# Patient Record
Sex: FEMALE | Race: WHITE | NOT HISPANIC OR LATINO | Employment: UNEMPLOYED | URBAN - METROPOLITAN AREA
[De-identification: names, ages, dates, MRNs, and addresses within clinical notes are randomized per-mention and may not be internally consistent; named-entity substitution may affect disease eponyms.]

---

## 2023-09-15 ENCOUNTER — HOSPITAL ENCOUNTER (OUTPATIENT)
Dept: DATA CONVERSION | Facility: HOSPITAL | Age: 43
Discharge: HOME | End: 2023-09-15
Payer: COMMERCIAL

## 2023-09-15 DIAGNOSIS — D64.9 ANEMIA, UNSPECIFIED: ICD-10-CM

## 2023-09-15 DIAGNOSIS — R53.83 OTHER FATIGUE: ICD-10-CM

## 2023-09-15 DIAGNOSIS — M79.606 PAIN IN LEG, UNSPECIFIED: ICD-10-CM

## 2023-09-15 LAB
ALBUMIN SERPL-MCNC: 4 GM/DL (ref 3.5–5)
ALBUMIN/GLOB SERPL: 1.3 RATIO (ref 1.5–3)
ALP BLD-CCNC: 162 U/L (ref 35–125)
ALT SERPL-CCNC: 16 U/L (ref 5–40)
ANION GAP SERPL CALCULATED.3IONS-SCNC: 12 MMOL/L (ref 0–19)
AST SERPL-CCNC: 17 U/L (ref 5–40)
BASOPHILS # BLD AUTO: 0.05 K/UL (ref 0–0.22)
BASOPHILS NFR BLD AUTO: 0.6 % (ref 0–1)
BILIRUB SERPL-MCNC: 0.2 MG/DL (ref 0.1–1.2)
BUN SERPL-MCNC: 10 MG/DL (ref 8–25)
BUN/CREAT SERPL: 14.3 RATIO (ref 8–21)
CALCIUM SERPL-MCNC: 9.3 MG/DL (ref 8.5–10.4)
CHLORIDE SERPL-SCNC: 102 MMOL/L (ref 97–107)
CO2 SERPL-SCNC: 26 MMOL/L (ref 24–31)
CREAT SERPL-MCNC: 0.7 MG/DL (ref 0.4–1.6)
DEPRECATED RDW RBC AUTO: 48.9 FL (ref 37–54)
DIFFERENTIAL METHOD BLD: ABNORMAL
EOSINOPHIL # BLD AUTO: 0.3 K/UL (ref 0–0.45)
EOSINOPHIL NFR BLD: 3.7 % (ref 0–3)
ERYTHROCYTE [DISTWIDTH] IN BLOOD BY AUTOMATED COUNT: 15.9 % (ref 11.7–15)
FERRITIN SERPL-MCNC: 19 NG/ML (ref 13–150)
GFR SERPL CREATININE-BSD FRML MDRD: 109 ML/MIN/1.73 M2
GLOBULIN SER-MCNC: 3.2 G/DL (ref 1.9–3.7)
GLUCOSE SERPL-MCNC: 103 MG/DL (ref 65–99)
HCT VFR BLD AUTO: 41.5 % (ref 36–44)
HGB BLD-MCNC: 12.2 GM/DL (ref 12–15)
IMM GRANULOCYTES # BLD AUTO: 0.04 K/UL (ref 0–0.1)
IRON SATN MFR SERPL: 10.2 % (ref 12–50)
IRON SERPL-MCNC: 38 UG/DL (ref 30–160)
LYMPHOCYTES # BLD AUTO: 2.38 K/UL (ref 1.2–3.2)
LYMPHOCYTES NFR BLD MANUAL: 29.3 % (ref 20–40)
MCH RBC QN AUTO: 24.8 PG (ref 26–34)
MCHC RBC AUTO-ENTMCNC: 29.4 % (ref 31–37)
MCV RBC AUTO: 84.5 FL (ref 80–100)
MONOCYTES # BLD AUTO: 0.61 K/UL (ref 0–0.8)
MONOCYTES NFR BLD MANUAL: 7.5 % (ref 0–8)
NEUTROPHILS # BLD AUTO: 4.74 K/UL
NEUTROPHILS # BLD AUTO: 4.74 K/UL (ref 1.8–7.7)
NEUTROPHILS.IMMATURE NFR BLD: 0.5 % (ref 0–1)
NEUTS SEG NFR BLD: 58.4 % (ref 50–70)
NRBC BLD-RTO: 0 /100 WBC
PLATELET # BLD AUTO: 362 K/UL (ref 150–450)
PMV BLD AUTO: 9.9 CU (ref 7–12.6)
POTASSIUM SERPL-SCNC: 4.4 MMOL/L (ref 3.4–5.1)
PROT SERPL-MCNC: 7.2 G/DL (ref 5.9–7.9)
RBC # BLD AUTO: 4.91 M/UL (ref 4–4.9)
SODIUM SERPL-SCNC: 140 MMOL/L (ref 133–145)
TIBC SERPL-MCNC: 371 UG/DL (ref 228–428)
VIT B12 SERPL-MCNC: 314 PG/ML (ref 211–946)
WBC # BLD AUTO: 8.1 K/UL (ref 4.5–11)

## 2023-09-21 ENCOUNTER — HOSPITAL ENCOUNTER (OUTPATIENT)
Dept: DATA CONVERSION | Facility: HOSPITAL | Age: 43
Discharge: HOME | End: 2023-09-21
Payer: COMMERCIAL

## 2023-09-21 DIAGNOSIS — M16.11 UNILATERAL PRIMARY OSTEOARTHRITIS, RIGHT HIP: ICD-10-CM

## 2023-09-22 LAB — MITOCHONDRIAL ANTIBODY: NORMAL

## 2023-09-25 LAB
ALP BONE SERPL-CCNC: ABNORMAL U/L
ALP ISOS SERPL HS-CCNC: ABNORMAL U/L
ALP LIVER SERPL-CCNC: ABNORMAL U/L
ALP SERPL-CCNC: ABNORMAL U/L

## 2023-10-22 DIAGNOSIS — M79.2 NEUROPATHIC PAIN: Primary | ICD-10-CM

## 2023-11-01 RX ORDER — NORTRIPTYLINE HYDROCHLORIDE 25 MG/1
125 CAPSULE ORAL DAILY
Qty: 150 CAPSULE | Refills: 6 | Status: SHIPPED | OUTPATIENT
Start: 2023-11-01 | End: 2024-06-03

## 2023-11-24 ENCOUNTER — OFFICE VISIT (OUTPATIENT)
Dept: PRIMARY CARE | Facility: CLINIC | Age: 43
End: 2023-11-24
Payer: COMMERCIAL

## 2023-11-24 VITALS
HEART RATE: 72 BPM | TEMPERATURE: 98 F | SYSTOLIC BLOOD PRESSURE: 126 MMHG | WEIGHT: 293 LBS | DIASTOLIC BLOOD PRESSURE: 78 MMHG | BODY MASS INDEX: 44.41 KG/M2 | HEIGHT: 68 IN | OXYGEN SATURATION: 99 %

## 2023-11-24 DIAGNOSIS — H53.9 VISUAL DISTURBANCE: ICD-10-CM

## 2023-11-24 DIAGNOSIS — G89.29 CHRONIC NONINTRACTABLE HEADACHE, UNSPECIFIED HEADACHE TYPE: Primary | ICD-10-CM

## 2023-11-24 DIAGNOSIS — R51.9 CHRONIC NONINTRACTABLE HEADACHE, UNSPECIFIED HEADACHE TYPE: Primary | ICD-10-CM

## 2023-11-24 DIAGNOSIS — R26.89 BALANCE PROBLEM: ICD-10-CM

## 2023-11-24 PROCEDURE — 3008F BODY MASS INDEX DOCD: CPT | Performed by: FAMILY MEDICINE

## 2023-11-24 PROCEDURE — 1036F TOBACCO NON-USER: CPT | Performed by: FAMILY MEDICINE

## 2023-11-24 PROCEDURE — 99214 OFFICE O/P EST MOD 30 MIN: CPT | Performed by: FAMILY MEDICINE

## 2023-11-24 RX ORDER — HYOSCYAMINE SULFATE 0.125 MG
0.12 TABLET ORAL EVERY 4 HOURS PRN
COMMUNITY
End: 2023-11-24 | Stop reason: WASHOUT

## 2023-11-24 RX ORDER — LOSARTAN POTASSIUM AND HYDROCHLOROTHIAZIDE 25; 100 MG/1; MG/1
1 TABLET ORAL DAILY
COMMUNITY
Start: 2018-08-29

## 2023-11-24 RX ORDER — METOCLOPRAMIDE 5 MG/1
TABLET ORAL
COMMUNITY
End: 2024-01-09

## 2023-11-24 RX ORDER — HYOSCYAMINE SULFATE 0.38 MG/1
0.38 TABLET, EXTENDED RELEASE ORAL EVERY 12 HOURS PRN
COMMUNITY
End: 2023-11-24 | Stop reason: WASHOUT

## 2023-11-24 RX ORDER — METOCLOPRAMIDE 5 MG/1
5 TABLET ORAL 3 TIMES DAILY
COMMUNITY

## 2023-11-24 RX ORDER — NEBIVOLOL 20 MG/1
20 TABLET ORAL DAILY
COMMUNITY

## 2023-11-24 RX ORDER — ONDANSETRON 4 MG/1
4 TABLET, FILM COATED ORAL EVERY 8 HOURS PRN
COMMUNITY

## 2023-11-24 RX ORDER — HYOSCYAMINE SULFATE 0.12 MG/1
0.12 TABLET SUBLINGUAL
COMMUNITY
Start: 2020-09-16

## 2023-11-24 RX ORDER — VENLAFAXINE HYDROCHLORIDE 75 MG/1
75 CAPSULE, EXTENDED RELEASE ORAL DAILY
COMMUNITY
End: 2024-01-08 | Stop reason: SDUPTHER

## 2023-11-24 RX ORDER — LOSARTAN POTASSIUM 50 MG/1
100 TABLET ORAL DAILY
COMMUNITY
End: 2024-01-09

## 2023-11-24 RX ORDER — VENLAFAXINE HYDROCHLORIDE 150 MG/1
150 CAPSULE, EXTENDED RELEASE ORAL DAILY
COMMUNITY
End: 2024-01-08 | Stop reason: SDUPTHER

## 2023-11-24 RX ORDER — ALPRAZOLAM 1 MG/1
1 TABLET ORAL NIGHTLY PRN
COMMUNITY
End: 2024-01-08

## 2023-11-24 RX ORDER — SUMATRIPTAN SUCCINATE 100 MG/1
100 TABLET ORAL ONCE AS NEEDED
COMMUNITY

## 2023-11-24 RX ORDER — FAMOTIDINE 40 MG/1
40 TABLET, FILM COATED ORAL DAILY
COMMUNITY

## 2023-11-24 ASSESSMENT — ENCOUNTER SYMPTOMS
FEVER: 0
NAUSEA: 0
DIFFICULTY URINATING: 0
ENDOCRINE NEGATIVE: 1
DEPRESSION: 1
LOSS OF SENSATION IN FEET: 0
SHORTNESS OF BREATH: 0
OCCASIONAL FEELINGS OF UNSTEADINESS: 1
DIARRHEA: 0
LEG PAIN: 1
DIZZINESS: 0

## 2023-11-24 ASSESSMENT — PATIENT HEALTH QUESTIONNAIRE - PHQ9
SUM OF ALL RESPONSES TO PHQ9 QUESTIONS 1 & 2: 1
1. LITTLE INTEREST OR PLEASURE IN DOING THINGS: SEVERAL DAYS
2. FEELING DOWN, DEPRESSED OR HOPELESS: NOT AT ALL

## 2023-11-24 ASSESSMENT — PAIN SCALES - GENERAL: PAINLEVEL: 0-NO PAIN

## 2023-11-24 NOTE — PROGRESS NOTES
"Subjective   Patient ID: Angeline Gurrola is a 43 y.o. female who presents for Leg Pain and pain right side of face.    Chronic leg pain  Right side of face painful-in supraorbital area into maxillary region  Vision changes  Head pain  Balance issues    Leg Pain   The incident occurred more than 1 week ago.        Review of Systems   Constitutional:  Negative for fever.        Also see HPI   Eyes:  Negative for visual disturbance.   Respiratory:  Negative for shortness of breath.    Cardiovascular:  Negative for chest pain.   Gastrointestinal:  Negative for diarrhea and nausea.   Endocrine: Negative.    Genitourinary:  Negative for difficulty urinating.   Skin:  Negative for rash.   Neurological:  Negative for dizziness.        No focal deficits   Psychiatric/Behavioral:  Negative for suicidal ideas.    All other systems reviewed and are negative.      Objective   /78   Pulse 72   Temp 36.7 °C (98 °F)   Ht 1.727 m (5' 8\")   Wt (!) 202 kg (446 lb)   SpO2 99%   BMI 67.81 kg/m²     Physical Exam  Vitals and nursing note reviewed.   Constitutional:       Appearance: Normal appearance.   HENT:      Head: Normocephalic and atraumatic.   Eyes:      Extraocular Movements: Extraocular movements intact.      Conjunctiva/sclera: Conjunctivae normal.   Cardiovascular:      Rate and Rhythm: Normal rate and regular rhythm.      Heart sounds: Normal heart sounds.   Pulmonary:      Effort: Pulmonary effort is normal.      Breath sounds: Normal breath sounds.      Comments: Lungs essentially CTA b/l  Abdominal:      General: There is no distension.      Palpations: Abdomen is soft. There is no mass.      Tenderness: There is no abdominal tenderness.   Musculoskeletal:      Right lower leg: Edema present.      Left lower leg: Edema present.   Skin:     Coloration: Skin is not jaundiced.      Findings: No rash.   Neurological:      General: No focal deficit present.      Mental Status: She is alert and oriented to person, " place, and time.   Psychiatric:         Mood and Affect: Mood normal.         Behavior: Behavior normal.         Thought Content: Thought content normal.         Judgment: Judgment normal.         Assessment/Plan   Problem List Items Addressed This Visit    None  Visit Diagnoses         Codes    Chronic nonintractable headache, unspecified headache type    -  Primary R51.9, G89.29    Relevant Orders    MR brain w and wo IV contrast    Comprehensive Metabolic Panel    Visual disturbance     H53.9    Relevant Orders    MR brain w and wo IV contrast    Comprehensive Metabolic Panel    Balance problem     R26.89    Relevant Orders    MR brain w and wo IV contrast    Comprehensive Metabolic Panel

## 2023-12-05 ENCOUNTER — LAB (OUTPATIENT)
Dept: LAB | Facility: LAB | Age: 43
End: 2023-12-05
Payer: COMMERCIAL

## 2023-12-05 DIAGNOSIS — R26.89 BALANCE PROBLEM: ICD-10-CM

## 2023-12-05 DIAGNOSIS — G89.29 CHRONIC NONINTRACTABLE HEADACHE, UNSPECIFIED HEADACHE TYPE: ICD-10-CM

## 2023-12-05 DIAGNOSIS — H53.9 VISUAL DISTURBANCE: ICD-10-CM

## 2023-12-05 DIAGNOSIS — R51.9 CHRONIC NONINTRACTABLE HEADACHE, UNSPECIFIED HEADACHE TYPE: ICD-10-CM

## 2023-12-05 LAB
ALBUMIN SERPL BCP-MCNC: 4 G/DL (ref 3.4–5)
ALP SERPL-CCNC: 171 U/L (ref 33–110)
ALT SERPL W P-5'-P-CCNC: 18 U/L (ref 7–45)
ANION GAP SERPL CALC-SCNC: 12 MMOL/L (ref 10–20)
AST SERPL W P-5'-P-CCNC: 19 U/L (ref 9–39)
BILIRUB SERPL-MCNC: 0.4 MG/DL (ref 0–1.2)
BUN SERPL-MCNC: 8 MG/DL (ref 6–23)
CALCIUM SERPL-MCNC: 9.4 MG/DL (ref 8.6–10.3)
CHLORIDE SERPL-SCNC: 100 MMOL/L (ref 98–107)
CO2 SERPL-SCNC: 30 MMOL/L (ref 21–32)
CREAT SERPL-MCNC: 0.64 MG/DL (ref 0.5–1.05)
GFR SERPL CREATININE-BSD FRML MDRD: >90 ML/MIN/1.73M*2
GLUCOSE SERPL-MCNC: 97 MG/DL (ref 74–99)
POTASSIUM SERPL-SCNC: 4.4 MMOL/L (ref 3.5–5.3)
PROT SERPL-MCNC: 6.9 G/DL (ref 6.4–8.2)
SODIUM SERPL-SCNC: 138 MMOL/L (ref 136–145)

## 2023-12-05 PROCEDURE — 36415 COLL VENOUS BLD VENIPUNCTURE: CPT

## 2023-12-12 ENCOUNTER — APPOINTMENT (OUTPATIENT)
Dept: RADIOLOGY | Facility: HOSPITAL | Age: 43
End: 2023-12-12
Payer: COMMERCIAL

## 2023-12-18 ENCOUNTER — HOSPITAL ENCOUNTER (OUTPATIENT)
Dept: RADIOLOGY | Facility: HOSPITAL | Age: 43
Discharge: HOME | End: 2023-12-18
Payer: COMMERCIAL

## 2023-12-18 DIAGNOSIS — G89.29 CHRONIC NONINTRACTABLE HEADACHE, UNSPECIFIED HEADACHE TYPE: ICD-10-CM

## 2023-12-18 DIAGNOSIS — H53.9 VISUAL DISTURBANCE: ICD-10-CM

## 2023-12-18 DIAGNOSIS — R51.9 CHRONIC NONINTRACTABLE HEADACHE, UNSPECIFIED HEADACHE TYPE: ICD-10-CM

## 2023-12-18 DIAGNOSIS — R26.89 BALANCE PROBLEM: ICD-10-CM

## 2023-12-26 ENCOUNTER — HOSPITAL ENCOUNTER (OUTPATIENT)
Dept: RADIOLOGY | Facility: HOSPITAL | Age: 43
End: 2023-12-26
Payer: COMMERCIAL

## 2023-12-26 ENCOUNTER — TELEPHONE (OUTPATIENT)
Dept: PRIMARY CARE | Facility: CLINIC | Age: 43
End: 2023-12-26
Payer: COMMERCIAL

## 2023-12-26 ENCOUNTER — HOSPITAL ENCOUNTER (OUTPATIENT)
Dept: RADIOLOGY | Facility: HOSPITAL | Age: 43
Discharge: HOME | End: 2023-12-26
Payer: COMMERCIAL

## 2023-12-26 DIAGNOSIS — H53.9 UNSPECIFIED VISUAL DISTURBANCE: ICD-10-CM

## 2023-12-26 DIAGNOSIS — R51.9 HEADACHE, UNSPECIFIED: ICD-10-CM

## 2023-12-26 DIAGNOSIS — F41.9 ANXIETY: Primary | ICD-10-CM

## 2023-12-26 DIAGNOSIS — R26.89 OTHER ABNORMALITIES OF GAIT AND MOBILITY: ICD-10-CM

## 2024-01-02 RX ORDER — VENLAFAXINE HYDROCHLORIDE 150 MG/1
150 CAPSULE, EXTENDED RELEASE ORAL
Qty: 30 CAPSULE | Refills: 0 | OUTPATIENT
Start: 2024-01-02

## 2024-01-02 RX ORDER — VENLAFAXINE HYDROCHLORIDE 75 MG/1
75 CAPSULE, EXTENDED RELEASE ORAL
Qty: 30 CAPSULE | Refills: 0 | OUTPATIENT
Start: 2024-01-02

## 2024-01-03 DIAGNOSIS — F41.9 ANXIETY: Primary | ICD-10-CM

## 2024-01-04 ENCOUNTER — ANCILLARY PROCEDURE (OUTPATIENT)
Dept: RADIOLOGY | Facility: CLINIC | Age: 44
End: 2024-01-04
Payer: COMMERCIAL

## 2024-01-04 VITALS — BODY MASS INDEX: 44.41 KG/M2 | HEIGHT: 68 IN | WEIGHT: 293 LBS

## 2024-01-04 DIAGNOSIS — R26.89 OTHER ABNORMALITIES OF GAIT AND MOBILITY: Primary | ICD-10-CM

## 2024-01-04 DIAGNOSIS — R51.9 HEADACHE: ICD-10-CM

## 2024-01-04 PROCEDURE — 2550000001 HC RX 255 CONTRASTS: Performed by: FAMILY MEDICINE

## 2024-01-04 PROCEDURE — A9575 INJ GADOTERATE MEGLUMI 0.1ML: HCPCS | Performed by: FAMILY MEDICINE

## 2024-01-04 PROCEDURE — 70553 MRI BRAIN STEM W/O & W/DYE: CPT

## 2024-01-04 RX ORDER — GADOTERATE MEGLUMINE 376.9 MG/ML
20 INJECTION INTRAVENOUS
Status: COMPLETED | OUTPATIENT
Start: 2024-01-04 | End: 2024-01-04

## 2024-01-04 RX ADMIN — GADOTERATE MEGLUMINE 20 ML: 376.9 INJECTION INTRAVENOUS at 09:44

## 2024-01-08 RX ORDER — ALPRAZOLAM 1 MG/1
1 TABLET ORAL DAILY PRN
Qty: 30 TABLET | Refills: 2 | Status: SHIPPED | OUTPATIENT
Start: 2024-01-08 | End: 2024-04-21

## 2024-01-08 RX ORDER — VENLAFAXINE HYDROCHLORIDE 75 MG/1
75 CAPSULE, EXTENDED RELEASE ORAL DAILY
Qty: 30 CAPSULE | Refills: 11 | Status: SHIPPED | OUTPATIENT
Start: 2024-01-08

## 2024-01-08 RX ORDER — VENLAFAXINE HYDROCHLORIDE 150 MG/1
150 CAPSULE, EXTENDED RELEASE ORAL DAILY
Qty: 30 CAPSULE | Refills: 11 | Status: SHIPPED | OUTPATIENT
Start: 2024-01-08

## 2024-01-09 ENCOUNTER — OFFICE VISIT (OUTPATIENT)
Dept: OTOLARYNGOLOGY | Facility: CLINIC | Age: 44
End: 2024-01-09
Payer: COMMERCIAL

## 2024-01-09 VITALS — BODY MASS INDEX: 44.41 KG/M2 | WEIGHT: 293 LBS | TEMPERATURE: 96.8 F | HEIGHT: 68 IN

## 2024-01-09 DIAGNOSIS — R51.9 FACIAL PAIN: ICD-10-CM

## 2024-01-09 DIAGNOSIS — J31.0 CHRONIC RHINITIS: Primary | ICD-10-CM

## 2024-01-09 PROCEDURE — 31231 NASAL ENDOSCOPY DX: CPT | Performed by: OTOLARYNGOLOGY

## 2024-01-09 PROCEDURE — 99203 OFFICE O/P NEW LOW 30 MIN: CPT | Performed by: OTOLARYNGOLOGY

## 2024-01-09 PROCEDURE — 1036F TOBACCO NON-USER: CPT | Performed by: OTOLARYNGOLOGY

## 2024-01-09 PROCEDURE — 3008F BODY MASS INDEX DOCD: CPT | Performed by: OTOLARYNGOLOGY

## 2024-01-09 RX ORDER — CHLORDIAZEPOXIDE HYDROCHLORIDE AND CLIDINIUM BROMIDE 5; 2.5 MG/1; MG/1
1 CAPSULE ORAL
COMMUNITY

## 2024-01-09 RX ORDER — FLUTICASONE PROPIONATE 50 MCG
SPRAY, SUSPENSION (ML) NASAL
Qty: 16 G | Refills: 11 | Status: SHIPPED | OUTPATIENT
Start: 2024-01-09

## 2024-01-09 RX ORDER — ALBUTEROL 2 MG/1
2 TABLET ORAL 3 TIMES DAILY
COMMUNITY
End: 2024-03-12 | Stop reason: WASHOUT

## 2024-01-09 RX ORDER — AZELASTINE 1 MG/ML
SPRAY, METERED NASAL
Qty: 30 ML | Refills: 11 | Status: SHIPPED | OUTPATIENT
Start: 2024-01-09

## 2024-01-09 NOTE — PROGRESS NOTES
HPI  Angeline Gurrola is a 43 y.o. female presents with right-sided midface pain and pressure at times.  She does not have a dramatic amount of specific nasal symptoms of congestion or drainage.  She does have some vision changes bilaterally but seems to be worse on the right.  She has history of allergies but not using any nasal medications right now.      Past Medical History:   Diagnosis Date    Ankylosing spondylitis (CMS/HCC)     Asthma     Encounter for therapeutic drug level monitoring 05/23/2018    Encounter for methotrexate monitoring    Other obesity due to excess calories     Exogenous obesity    Personal history of other diseases of the circulatory system     History of peripheral vascular disease    Personal history of other diseases of the circulatory system     History of hypertension            Medications:     Current Outpatient Medications:     albuterol (Proventil) 2 mg tablet, Take 1 tablet (2 mg) by mouth 3 times a day., Disp: , Rfl:     ALPRAZolam (Xanax) 1 mg tablet, TAKE 1 TABLET BY MOUTH ONCE DAILY AS NEEDED FOR ANXIETY, Disp: 30 tablet, Rfl: 2    chlordiazepoxide-clidinium (Librax) 5-2.5 mg capsule, Take 1 capsule by mouth 4 times a day before meals., Disp: , Rfl:     famotidine (Pepcid) 40 mg tablet, Take 1 tablet (40 mg) by mouth once daily., Disp: , Rfl:     hyoscyamine 0.125 mg SL tablet, Take 1 tablet (0.125 mg) by mouth., Disp: , Rfl:     losartan-hydrochlorothiazide (Hyzaar) 100-25 mg tablet, Take 1 tablet by mouth once daily., Disp: , Rfl:     metoclopramide (Reglan) 5 mg tablet, Take 1 tablet (5 mg) by mouth once daily., Disp: , Rfl:     nebivolol (Bystolic) 20 mg tablet, Take 1 tablet (20 mg) by mouth once daily., Disp: , Rfl:     nortriptyline (Pamelor) 25 mg capsule, Take 5 capsules (125 mg) by mouth once daily., Disp: 150 capsule, Rfl: 6    venlafaxine XR (Effexor-XR) 150 mg 24 hr capsule, Take 1 capsule (150 mg) by mouth once daily. Do not crush or chew. Total 225 mg once daily,  "Disp: 30 capsule, Rfl: 11    venlafaxine XR (Effexor-XR) 75 mg 24 hr capsule, Take 1 capsule (75 mg) by mouth once daily. Do not crush or chew. Total 225 mg once daily, Disp: 30 capsule, Rfl: 11    ixekizumab (Taltz) 80 mg/mL injection, Inject 1 mL (80 mg) under the skin., Disp: , Rfl:     losartan (Cozaar) 50 mg tablet, Take 2 tablets (100 mg) by mouth once daily., Disp: , Rfl:     metoclopramide (Reglan) 5 mg tablet, 1 tablet before meals Orally three times per day, Disp: , Rfl:     ondansetron (Zofran) 4 mg tablet, Take 1 tablet (4 mg) by mouth every 8 hours if needed for nausea or vomiting., Disp: , Rfl:     SUMAtriptan (Imitrex) 100 mg tablet, Take 1 tablet (100 mg) by mouth 1 time if needed for migraine., Disp: , Rfl:      Allergies:  Allergies   Allergen Reactions    Infliximab Anaphylaxis    Zolmitriptan Palpitations and Other     Chest pain    Penicillins Hives, Swelling and Rash    Sulfa (Sulfonamide Antibiotics) Hives and Swelling    Cephalexin GI Upset, Nausea/vomiting and Unknown    Doxycycline GI Upset    Oxycodone-Acetaminophen Unknown        Physical Exam:  Last Recorded Vitals  Temperature 36 °C (96.8 °F), height 1.727 m (5' 8\"), weight (!) 196 kg (431 lb).  General:     General appearance: Well-developed, well-nourished in no acute distress.       Voice:  normal       Head/face: Normal appearance; nontender to palpation     Facial nerve function: Normal and symmetric bilaterally.    Oral/oropharynx:     Oral vestibule: Normal labial and gingival mucosa     Tongue/floor of mouth: Normal without lesion     Oropharynx: Clear.  No lesions present of the hard/soft palate, posterior pharynx    Neck:     Neck: Normal appearance, trachea midline     Salivary glands: Normal to palpation bilaterally     Lymph nodes: No cervical lymphadenopathy to palpation     Thyroid: No thyromegaly.  No palpable nodules     Range of motion: Normal    Neurological:     Cortical functions: Alert and oriented x3, appropriate " affect       Larynx/hypopharynx:     Laryngeal findings: Mirror exam inadequate or limited secondary to enlarged base of tongue and/or excessive gagging    Ear:     Ear canal: Normal bilaterally     Tympanic membrane: Intact and mobile bilaterally     Pinna: Normal bilaterally     Hearing:  Gross hearing assessment normal by voice    Nose:     Visualized using: Flexible nasal endoscopy utilized secondary to inadequate anterior rhinoscopy  Nasopharynx: Inadequate mirror examination as above, endoscopy demonstrates normal nasopharynx without obstruction or mass     Nasal dorsum: Nontraumatic midline appearance     Septum: Deviation     Inferior turbinates: Normally sized     Mucosa: Bilateral, erythema and mucus but no pus or polyps      Assessment/Plan   She has evidence of chronic rhinitis.  Not clear if this is a cause for her midface pain.  She does not have evidence of infection.  Recommend trial of nasal sprays.  She will update in about a month.  If no improvement, CT scan of the sinuses.  Neuralgia is also to be considered here         Shin Villareal MD

## 2024-02-01 ENCOUNTER — TELEPHONE (OUTPATIENT)
Dept: PRIMARY CARE | Facility: CLINIC | Age: 44
End: 2024-02-01
Payer: COMMERCIAL

## 2024-02-01 DIAGNOSIS — G93.5 CHIARI I MALFORMATION (MULTI): Primary | ICD-10-CM

## 2024-02-01 NOTE — TELEPHONE ENCOUNTER
----- Message from Angeline Gurrola sent at 2/1/2024  2:58 PM EST -----  Regarding: Referral Needed for Neurologist  Contact: [phone]  Hi Dr. Dailey,     After we spoke today I called Dr. Aubree Young's office to set up an appointment. Her office said they needed a referral from you in order to make an appointment to see Dr. Young. Could you please send one to her office.  Thank You,  Angeline Gurrola

## 2024-02-16 ENCOUNTER — OFFICE VISIT (OUTPATIENT)
Dept: PRIMARY CARE | Facility: CLINIC | Age: 44
End: 2024-02-16
Payer: COMMERCIAL

## 2024-02-16 VITALS
HEART RATE: 62 BPM | WEIGHT: 293 LBS | OXYGEN SATURATION: 97 % | HEIGHT: 68 IN | TEMPERATURE: 97.9 F | SYSTOLIC BLOOD PRESSURE: 136 MMHG | DIASTOLIC BLOOD PRESSURE: 80 MMHG | BODY MASS INDEX: 44.41 KG/M2

## 2024-02-16 DIAGNOSIS — M79.604 LEG PAIN, BILATERAL: ICD-10-CM

## 2024-02-16 DIAGNOSIS — L29.9 ITCHING OF EAR: Primary | ICD-10-CM

## 2024-02-16 DIAGNOSIS — R51.9 ACHING HEADACHE: ICD-10-CM

## 2024-02-16 DIAGNOSIS — J30.1 SEASONAL ALLERGIC RHINITIS DUE TO POLLEN: ICD-10-CM

## 2024-02-16 DIAGNOSIS — M79.671 RIGHT FOOT PAIN: ICD-10-CM

## 2024-02-16 DIAGNOSIS — M79.605 LEG PAIN, BILATERAL: ICD-10-CM

## 2024-02-16 PROCEDURE — 99214 OFFICE O/P EST MOD 30 MIN: CPT | Performed by: FAMILY MEDICINE

## 2024-02-16 PROCEDURE — 1036F TOBACCO NON-USER: CPT | Performed by: FAMILY MEDICINE

## 2024-02-16 PROCEDURE — 3008F BODY MASS INDEX DOCD: CPT | Performed by: FAMILY MEDICINE

## 2024-02-16 RX ORDER — DIPHENOXYLATE HYDROCHLORIDE AND ATROPINE SULFATE 2.5; .025 MG/1; MG/1
1 TABLET ORAL 3 TIMES DAILY PRN
COMMUNITY
Start: 2023-12-22

## 2024-02-16 RX ORDER — MONTELUKAST SODIUM 10 MG/1
10 TABLET ORAL NIGHTLY PRN
Qty: 30 TABLET | Refills: 5 | Status: SHIPPED | OUTPATIENT
Start: 2024-02-16 | End: 2024-08-14

## 2024-02-16 RX ORDER — ALBUTEROL SULFATE 90 UG/1
2 AEROSOL, METERED RESPIRATORY (INHALATION) EVERY 4 HOURS PRN
COMMUNITY
Start: 2023-12-26

## 2024-02-16 RX ORDER — FLUTICASONE FUROATE AND VILANTEROL 200; 25 UG/1; UG/1
POWDER RESPIRATORY (INHALATION)
COMMUNITY
Start: 2019-07-31 | End: 2024-03-12 | Stop reason: WASHOUT

## 2024-02-16 RX ORDER — DICLOFENAC SODIUM AND MISOPROSTOL 75; 200 MG/1; UG/1
1 TABLET, DELAYED RELEASE ORAL 2 TIMES DAILY PRN
Qty: 30 TABLET | Refills: 0 | Status: SHIPPED | OUTPATIENT
Start: 2024-02-16 | End: 2025-02-15

## 2024-02-16 RX ORDER — METOLAZONE 5 MG/1
5 TABLET ORAL EVERY 24 HOURS
COMMUNITY

## 2024-02-16 RX ORDER — SUCRALFATE 1 G/10ML
10 SUSPENSION ORAL
COMMUNITY
End: 2024-03-12 | Stop reason: WASHOUT

## 2024-02-16 ASSESSMENT — PATIENT HEALTH QUESTIONNAIRE - PHQ9
SUM OF ALL RESPONSES TO PHQ9 QUESTIONS 1 AND 2: 0
2. FEELING DOWN, DEPRESSED OR HOPELESS: NOT AT ALL
1. LITTLE INTEREST OR PLEASURE IN DOING THINGS: NOT AT ALL

## 2024-02-16 ASSESSMENT — ENCOUNTER SYMPTOMS
LOSS OF SENSATION IN FEET: 0
DEPRESSION: 0
OCCASIONAL FEELINGS OF UNSTEADINESS: 1

## 2024-02-16 ASSESSMENT — PAIN SCALES - GENERAL: PAINLEVEL: 6

## 2024-02-16 NOTE — PROGRESS NOTES
"Subjective   Patient ID: Angeline Gurrola is a 44 y.o. female who presents for Leg Pain (B/L), Facial Pain (X 1 year-getting worse), and Ear Problem (Right ear-Itchiness ).    HPI     Review of Systems    Objective   /80   Pulse 62   Temp 36.6 °C (97.9 °F)   Ht 1.727 m (5' 8\")   Wt (!) 203 kg (448 lb 6.4 oz)   SpO2 97%   BMI 68.18 kg/m²     Physical Exam    Assessment/Plan   {Assess/PlanSmartLinks:21575}       "

## 2024-02-20 ASSESSMENT — ENCOUNTER SYMPTOMS: LEG PAIN: 1

## 2024-02-20 NOTE — PROGRESS NOTES
"Subjective   Patient ID: Angeline Gurrola is a 44 y.o. female who presents for Leg Pain (B/L), Facial Pain (X 1 year-getting worse), and Ear Problem (Right ear-Itchiness ).    Itching of ear  Aching headache  Seasonal allergic rhinitis due to pollen  Leg pain, bilateral  Right foot pain    Leg Pain     Facial Pain  Pertinent negatives include no chest pain, fever, nausea or rash.        Review of Systems   Constitutional:  Negative for fever.        Also see HPI   Eyes:  Negative for visual disturbance.   Respiratory:  Negative for shortness of breath.    Cardiovascular:  Negative for chest pain.   Gastrointestinal:  Negative for diarrhea and nausea.   Endocrine: Negative.    Genitourinary:  Negative for difficulty urinating.   Skin:  Negative for rash.   Neurological:  Negative for dizziness.        No focal deficits   Psychiatric/Behavioral:  Negative for suicidal ideas.    All other systems reviewed and are negative.      Objective   /80   Pulse 62   Temp 36.6 °C (97.9 °F)   Ht 1.727 m (5' 8\")   Wt (!) 203 kg (448 lb 6.4 oz)   SpO2 97%   BMI 68.18 kg/m²     Physical Exam  Vitals and nursing note reviewed.   Constitutional:       Appearance: Normal appearance.   HENT:      Head: Normocephalic and atraumatic.   Eyes:      Extraocular Movements: Extraocular movements intact.      Conjunctiva/sclera: Conjunctivae normal.   Cardiovascular:      Rate and Rhythm: Normal rate and regular rhythm.      Heart sounds: Normal heart sounds.   Pulmonary:      Effort: Pulmonary effort is normal.      Breath sounds: Normal breath sounds.      Comments: Lungs essentially CTA b/l  Abdominal:      General: There is no distension.      Palpations: Abdomen is soft. There is no mass.      Tenderness: There is no abdominal tenderness.   Musculoskeletal:      Right lower leg: No edema.      Left lower leg: No edema.   Skin:     Coloration: Skin is not jaundiced.      Findings: No rash.   Neurological:      General: No focal " deficit present.      Mental Status: She is alert and oriented to person, place, and time.   Psychiatric:         Mood and Affect: Mood normal.         Behavior: Behavior normal.         Thought Content: Thought content normal.         Judgment: Judgment normal.         Assessment/Plan   Diagnoses and all orders for this visit:  Itching of ear  Aching headache  Seasonal allergic rhinitis due to pollen  -     montelukast (Singulair) 10 mg tablet; Take 1 tablet (10 mg) by mouth as needed at bedtime (allergies).  Leg pain, bilateral  -     diclofenac-misoprostoL (Arthrotec 75)  mg-mcg EC tablet; Take 1 tablet by mouth 2 times a day as needed (pain). Do not crush, chew, or split.  Right foot pain  -     XR foot right 1-2 views; Future

## 2024-03-06 ENCOUNTER — HOSPITAL ENCOUNTER (OUTPATIENT)
Dept: RADIOLOGY | Facility: CLINIC | Age: 44
Discharge: HOME | End: 2024-03-06
Payer: COMMERCIAL

## 2024-03-06 ENCOUNTER — OFFICE VISIT (OUTPATIENT)
Dept: RHEUMATOLOGY | Facility: CLINIC | Age: 44
End: 2024-03-06
Payer: COMMERCIAL

## 2024-03-06 ENCOUNTER — LAB (OUTPATIENT)
Dept: LAB | Facility: LAB | Age: 44
End: 2024-03-06
Payer: COMMERCIAL

## 2024-03-06 VITALS
BODY MASS INDEX: 68.57 KG/M2 | DIASTOLIC BLOOD PRESSURE: 90 MMHG | WEIGHT: 293 LBS | SYSTOLIC BLOOD PRESSURE: 154 MMHG | OXYGEN SATURATION: 97 % | HEART RATE: 68 BPM

## 2024-03-06 DIAGNOSIS — M45.9 ANKYLOSING SPONDYLITIS, UNSPECIFIED SITE OF SPINE (MULTI): Primary | ICD-10-CM

## 2024-03-06 DIAGNOSIS — M45.9 ANKYLOSING SPONDYLITIS, UNSPECIFIED SITE OF SPINE (MULTI): ICD-10-CM

## 2024-03-06 DIAGNOSIS — M79.671 RIGHT FOOT PAIN: ICD-10-CM

## 2024-03-06 LAB
CRP SERPL-MCNC: 1.81 MG/DL
ERYTHROCYTE [DISTWIDTH] IN BLOOD BY AUTOMATED COUNT: 14.8 % (ref 11.5–14.5)
ERYTHROCYTE [SEDIMENTATION RATE] IN BLOOD BY WESTERGREN METHOD: 31 MM/H (ref 0–20)
HCT VFR BLD AUTO: 42 % (ref 36–46)
HGB BLD-MCNC: 12.4 G/DL (ref 12–16)
MCH RBC QN AUTO: 25.6 PG (ref 26–34)
MCHC RBC AUTO-ENTMCNC: 29.5 G/DL (ref 32–36)
MCV RBC AUTO: 87 FL (ref 80–100)
NRBC BLD-RTO: 0 /100 WBCS (ref 0–0)
PLATELET # BLD AUTO: 376 X10*3/UL (ref 150–450)
RBC # BLD AUTO: 4.85 X10*6/UL (ref 4–5.2)
WBC # BLD AUTO: 8.9 X10*3/UL (ref 4.4–11.3)

## 2024-03-06 PROCEDURE — 86140 C-REACTIVE PROTEIN: CPT

## 2024-03-06 PROCEDURE — 86481 TB AG RESPONSE T-CELL SUSP: CPT

## 2024-03-06 PROCEDURE — 73630 X-RAY EXAM OF FOOT: CPT | Mod: RIGHT SIDE | Performed by: RADIOLOGY

## 2024-03-06 PROCEDURE — 1036F TOBACCO NON-USER: CPT | Performed by: INTERNAL MEDICINE

## 2024-03-06 PROCEDURE — 99214 OFFICE O/P EST MOD 30 MIN: CPT | Performed by: INTERNAL MEDICINE

## 2024-03-06 PROCEDURE — 85652 RBC SED RATE AUTOMATED: CPT

## 2024-03-06 PROCEDURE — 3008F BODY MASS INDEX DOCD: CPT | Performed by: INTERNAL MEDICINE

## 2024-03-06 PROCEDURE — 85027 COMPLETE CBC AUTOMATED: CPT

## 2024-03-06 PROCEDURE — 36415 COLL VENOUS BLD VENIPUNCTURE: CPT

## 2024-03-06 PROCEDURE — 73630 X-RAY EXAM OF FOOT: CPT | Mod: RT

## 2024-03-06 ASSESSMENT — ENCOUNTER SYMPTOMS
EYE REDNESS: 1
CONSTITUTIONAL NEGATIVE: 1
NECK PAIN: 1
GASTROINTESTINAL NEGATIVE: 1
NECK STIFFNESS: 1
RESPIRATORY NEGATIVE: 1
MYALGIAS: 1
BACK PAIN: 1
JOINT SWELLING: 1
PSYCHIATRIC NEGATIVE: 1
FACIAL SWELLING: 0
ENDOCRINE NEGATIVE: 1

## 2024-03-06 NOTE — PROGRESS NOTES
Subjective   Patient ID: Angeline Gurrola is a 44 y.o. female who presents for Follow-up (Medication change).  HPI    Ankylosing spondylitis positive HLA-B27 methotrexate or Humira ineffective . Remicade infusion was helpful but had reaction to the medication. Simponi Aria and Humira were ineffective. Previously was on Cosentyx but started losing efficacy. Gave her samples of Toltz and took it for 4 months, but states that she received most relief from Cosentyx, and would like to return to Cosentyx.    Knees, back (C4-C5), shoulders, and hand pain. Patient states symptoms have worsen since seen last (12/14/22). Patient states pain is 8/10. Taking     Recent MRI for vision changed secondary to hemangioma. Neurologist, Aubree Young, next week. Patient states that she has had 6 falls within a two month span. Last fall was 2 weeks ago. Denies breaking any bones.      Last knee injection was done 10/23. Patient is aware she has knee arthritis and lymphedema bilaterally. Patient cracked two teeth, and states teeth have gotten progressive less. Denies shortness of breath. Admits dry mouth and dry eyes.    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    Review of Systems   Constitutional: Negative.    HENT:  Positive for dental problem. Negative for facial swelling and mouth sores.    Eyes:  Positive for redness and visual disturbance.   Respiratory: Negative.     Cardiovascular:  Positive for leg swelling. Negative for chest pain.   Gastrointestinal: Negative.    Endocrine: Negative.    Genitourinary: Negative.    Musculoskeletal:  Positive for back pain, joint swelling, myalgias, neck pain and neck stiffness.   Skin: Negative.    Psychiatric/Behavioral: Negative.           Objective   Physical Exam  Constitutional:       Appearance: Normal appearance.   HENT:      Head: Normocephalic.      Nose: Nose normal.      Mouth/Throat:      Mouth: Mucous membranes are moist.   Eyes:       Extraocular Movements: Extraocular movements intact.   Cardiovascular:      Rate and Rhythm: Normal rate and regular rhythm.      Pulses: Normal pulses.      Heart sounds: Normal heart sounds.   Pulmonary:      Effort: Pulmonary effort is normal.      Breath sounds: Normal breath sounds.   Abdominal:      Palpations: Abdomen is soft.   Musculoskeletal:         General: Swelling and tenderness present.      Cervical back: Tenderness present.      Right lower leg: Edema present.      Left lower leg: Edema present.   Skin:     General: Skin is warm and dry.      Capillary Refill: Capillary refill takes less than 2 seconds.   Neurological:      General: No focal deficit present.      Mental Status: She is alert and oriented to person, place, and time.         Assessment/Plan   Diagnoses and all orders for this visit:  Ankylosing spondylitis, unspecified site of spine (CMS/HCC)    -Cosentyx IV ordered   -Blood work ordered: TB test, liver panel, and inflammatory markers  -Follow up in 4-5 months    Pérez Douglas DPM 03/06/24 2:01 PM

## 2024-03-07 DIAGNOSIS — M45.9 ANKYLOSING SPONDYLITIS, UNSPECIFIED SITE OF SPINE (MULTI): Primary | ICD-10-CM

## 2024-03-07 RX ORDER — ALBUTEROL SULFATE 0.83 MG/ML
3 SOLUTION RESPIRATORY (INHALATION) AS NEEDED
Status: CANCELLED | OUTPATIENT
Start: 2024-03-29

## 2024-03-07 RX ORDER — EPINEPHRINE 0.3 MG/.3ML
0.3 INJECTION SUBCUTANEOUS EVERY 5 MIN PRN
Status: CANCELLED | OUTPATIENT
Start: 2024-03-08

## 2024-03-07 RX ORDER — HEPARIN 100 UNIT/ML
500 SYRINGE INTRAVENOUS AS NEEDED
OUTPATIENT
Start: 2024-03-07

## 2024-03-07 RX ORDER — DIPHENHYDRAMINE HYDROCHLORIDE 50 MG/ML
50 INJECTION INTRAMUSCULAR; INTRAVENOUS AS NEEDED
Status: CANCELLED | OUTPATIENT
Start: 2024-03-29

## 2024-03-07 RX ORDER — FAMOTIDINE 10 MG/ML
20 INJECTION INTRAVENOUS ONCE AS NEEDED
Status: CANCELLED | OUTPATIENT
Start: 2024-03-08

## 2024-03-07 RX ORDER — FAMOTIDINE 10 MG/ML
20 INJECTION INTRAVENOUS ONCE AS NEEDED
Status: CANCELLED | OUTPATIENT
Start: 2024-03-29

## 2024-03-07 RX ORDER — ALBUTEROL SULFATE 0.83 MG/ML
3 SOLUTION RESPIRATORY (INHALATION) AS NEEDED
Status: CANCELLED | OUTPATIENT
Start: 2024-03-08

## 2024-03-07 RX ORDER — DIPHENHYDRAMINE HYDROCHLORIDE 50 MG/ML
50 INJECTION INTRAMUSCULAR; INTRAVENOUS AS NEEDED
Status: CANCELLED | OUTPATIENT
Start: 2024-03-08

## 2024-03-07 RX ORDER — EPINEPHRINE 0.3 MG/.3ML
0.3 INJECTION SUBCUTANEOUS EVERY 5 MIN PRN
Status: CANCELLED | OUTPATIENT
Start: 2024-03-29

## 2024-03-07 RX ORDER — HEPARIN SODIUM,PORCINE/PF 10 UNIT/ML
50 SYRINGE (ML) INTRAVENOUS AS NEEDED
OUTPATIENT
Start: 2024-03-07

## 2024-03-08 ENCOUNTER — HOSPITAL ENCOUNTER (EMERGENCY)
Facility: HOSPITAL | Age: 44
Discharge: HOME | End: 2024-03-08
Attending: FAMILY MEDICINE
Payer: COMMERCIAL

## 2024-03-08 VITALS
SYSTOLIC BLOOD PRESSURE: 158 MMHG | HEART RATE: 70 BPM | BODY MASS INDEX: 44.41 KG/M2 | OXYGEN SATURATION: 94 % | RESPIRATION RATE: 17 BRPM | HEIGHT: 68 IN | WEIGHT: 293 LBS | TEMPERATURE: 98.4 F | DIASTOLIC BLOOD PRESSURE: 94 MMHG

## 2024-03-08 DIAGNOSIS — G43.909 MIGRAINE WITHOUT STATUS MIGRAINOSUS, NOT INTRACTABLE, UNSPECIFIED MIGRAINE TYPE: Primary | ICD-10-CM

## 2024-03-08 DIAGNOSIS — R11.0 NAUSEA: ICD-10-CM

## 2024-03-08 PROCEDURE — 96374 THER/PROPH/DIAG INJ IV PUSH: CPT

## 2024-03-08 PROCEDURE — 2500000004 HC RX 250 GENERAL PHARMACY W/ HCPCS (ALT 636 FOR OP/ED): Performed by: FAMILY MEDICINE

## 2024-03-08 PROCEDURE — 99284 EMERGENCY DEPT VISIT MOD MDM: CPT | Mod: 25

## 2024-03-08 PROCEDURE — 96375 TX/PRO/DX INJ NEW DRUG ADDON: CPT

## 2024-03-08 PROCEDURE — 96361 HYDRATE IV INFUSION ADD-ON: CPT

## 2024-03-08 RX ORDER — DIPHENHYDRAMINE HYDROCHLORIDE 50 MG/ML
50 INJECTION INTRAMUSCULAR; INTRAVENOUS ONCE
Status: COMPLETED | OUTPATIENT
Start: 2024-03-08 | End: 2024-03-08

## 2024-03-08 RX ORDER — METOCLOPRAMIDE HYDROCHLORIDE 5 MG/ML
10 INJECTION INTRAMUSCULAR; INTRAVENOUS ONCE
Status: COMPLETED | OUTPATIENT
Start: 2024-03-08 | End: 2024-03-08

## 2024-03-08 RX ORDER — KETOROLAC TROMETHAMINE 15 MG/ML
15 INJECTION, SOLUTION INTRAMUSCULAR; INTRAVENOUS ONCE
Status: COMPLETED | OUTPATIENT
Start: 2024-03-08 | End: 2024-03-08

## 2024-03-08 RX ADMIN — DIPHENHYDRAMINE HYDROCHLORIDE 50 MG: 50 INJECTION INTRAMUSCULAR; INTRAVENOUS at 20:45

## 2024-03-08 RX ADMIN — SODIUM CHLORIDE 1000 ML: 9 INJECTION, SOLUTION INTRAVENOUS at 20:44

## 2024-03-08 RX ADMIN — KETOROLAC TROMETHAMINE 15 MG: 15 INJECTION INTRAMUSCULAR; INTRAVENOUS at 20:44

## 2024-03-08 RX ADMIN — METOCLOPRAMIDE HYDROCHLORIDE 10 MG: 5 INJECTION INTRAMUSCULAR; INTRAVENOUS at 20:44

## 2024-03-08 ASSESSMENT — PAIN SCALES - GENERAL
PAINLEVEL_OUTOF10: 9
PAINLEVEL_OUTOF10: 10 - WORST POSSIBLE PAIN

## 2024-03-08 ASSESSMENT — COLUMBIA-SUICIDE SEVERITY RATING SCALE - C-SSRS
1. IN THE PAST MONTH, HAVE YOU WISHED YOU WERE DEAD OR WISHED YOU COULD GO TO SLEEP AND NOT WAKE UP?: NO
6. HAVE YOU EVER DONE ANYTHING, STARTED TO DO ANYTHING, OR PREPARED TO DO ANYTHING TO END YOUR LIFE?: NO
2. HAVE YOU ACTUALLY HAD ANY THOUGHTS OF KILLING YOURSELF?: NO

## 2024-03-08 ASSESSMENT — PAIN DESCRIPTION - ORIENTATION: ORIENTATION: LEFT

## 2024-03-08 ASSESSMENT — PAIN - FUNCTIONAL ASSESSMENT
PAIN_FUNCTIONAL_ASSESSMENT: 0-10
PAIN_FUNCTIONAL_ASSESSMENT: 0-10

## 2024-03-08 ASSESSMENT — PAIN DESCRIPTION - LOCATION
LOCATION: HEAD
LOCATION: HEAD

## 2024-03-08 ASSESSMENT — PAIN DESCRIPTION - PAIN TYPE: TYPE: ACUTE PAIN

## 2024-03-09 LAB
NIL(NEG) CONTROL SPOT COUNT: NORMAL
PANEL A SPOT COUNT: 0
PANEL B SPOT COUNT: 0
POS CONTROL SPOT COUNT: NORMAL
T-SPOT. TB INTERPRETATION: NEGATIVE

## 2024-03-09 NOTE — ED PROVIDER NOTES
HPI   Chief Complaint   Patient presents with    Headache     Pt presents with c/o migraine like headache for the last few days, states she took sumatriptan yesterday with little relief       44-year-old female with history of morbid obesity, hypertension, migraines, GERD, anxiety, and asthma comes to the ED with complaint of persistent migraine headache over the left side of her head for the last several days.  Patient reports he attempted take some sumatriptan yesterday but it did not help and continue using Tylenol and Motrin with little improvement.  Patient reports some intermittent nausea as well as being very sensitive to light/noise/stress that aggravates her headache.  Patient reports is not different from her typical migraine she gets and is just not yet passed.  Patient in the ED is alert, cooperative, appears anxious, uncomfortable, but in no distress.  Patient currently denies blurred vision, chest pain, back pain, abdominal pain, shortness of breath, fevers, falls, recent travel, sick contacts, vomiting/diarrhea, dysuria, dizziness, worsening of life, thunderclap headache, and weakness.      History provided by:  Patient and medical records   used: No                        [place] Coma Scale Score: 15                     Patient History   Past Medical History:   Diagnosis Date    Ankylosing spondylitis (CMS/[facility])     Anxiety 2015    Asthma     Encounter for therapeutic drug level monitoring 05/23/2018    Encounter for methotrexate monitoring    GERD (gastroesophageal reflux disease) 2002    Headache 2023    Hypertension 2014    Other obesity due to excess calories     Exogenous obesity    Personal history of other diseases of the circulatory system     History of peripheral vascular disease    Personal history of other diseases of the circulatory system     History of hypertension    Visual impairment 2023     Past Surgical History:   Procedure Laterality Date    ANKLE SURGERY   08/28/2013    Ankle Surgery    CHOLECYSTECTOMY      KNEE ARTHROSCOPY W/ DEBRIDEMENT  08/28/2013    Arthroscopy Knee    OOPHORECTOMY  08/28/2013    Oophorectomy     Family History   Problem Relation Name Age of Onset    Hypertension Mother Marni Gurrola     Arthritis Father Juwan Gurrola     Cancer Father Juwan Gurrola     Diabetes Father Juwan Pocjemima     Hypertension Father Juwan Gurrola     Arthritis Maternal Grandmother Marni Sanderson     Colon cancer Maternal Grandmother Marni Sanderson     Arthritis Paternal Grandmother Veronica Gurrola     Hypertension Paternal Grandmother Veronica Gurrola      Social History     Tobacco Use    Smoking status: Never    Smokeless tobacco: Never   Vaping Use    Vaping Use: Never used   Substance Use Topics    Alcohol use: Not Currently    Drug use: Never       Physical Exam   ED Triage Vitals [03/08/24 1956]   Temperature Heart Rate Respirations BP   36.9 °C (98.4 °F) 70 17 (!) 158/94      Pulse Ox Temp Source Heart Rate Source Patient Position   94 % Temporal -- --      BP Location FiO2 (%)     -- --       Physical Exam  Vitals and nursing note reviewed.   Constitutional:       General: She is not in acute distress.     Appearance: She is well-developed.   HENT:      Head: Normocephalic and atraumatic.   Eyes:      Conjunctiva/sclera: Conjunctivae normal.   Cardiovascular:      Rate and Rhythm: Normal rate and regular rhythm.      Heart sounds: No murmur heard.  Pulmonary:      Effort: Pulmonary effort is normal. No respiratory distress.      Breath sounds: Normal breath sounds.   Abdominal:      Palpations: Abdomen is soft.      Tenderness: There is no abdominal tenderness.   Musculoskeletal:         General: No swelling.      Cervical back: Neck supple.   Skin:     General: Skin is warm and dry.      Capillary Refill: Capillary refill takes less than 2 seconds.   Neurological:      General: No focal deficit present.      Mental Status: She is alert and oriented to person, place, and  time.      GCS: GCS eye subscore is 4. GCS verbal subscore is 5. GCS motor subscore is 6.      Cranial Nerves: Cranial nerves 2-12 are intact.      Sensory: Sensation is intact.      Motor: Motor function is intact.      Coordination: Coordination is intact.      Gait: Gait is intact.   Psychiatric:         Mood and Affect: Mood normal.         ED Course & MDM   Diagnoses as of 03/08/24 4310   Migraine without status migrainosus, not intractable, unspecified migraine type   Nausea       Medical Decision Making  Patient upon arrival to the ED appeared to be anxious and uncomfortable but in no distress stable vital signs.  Discussed with patient presenting complaints and clinical findings.  Reviewed with patient epic chart and counseled patient on migraine headaches and appropriate approach to management/treatments.  After assessment and evaluation IV fluids started, given IV Reglan, IV Benadryl, IV Toradol, placed on cardiac monitor, and observed.  After treatment and a period of rest patient is reassessed finally feeling much better, headache was nearly resolved, nausea resolved, tolerated p.o. challenge, vital signs stable, and no new physical complaints.  Patient vies this time to contact her primary care doctor for follow-up and recheck in several days, educate the patient on use of home medications for management of her headaches, and patient discharged home.    Amount and/or Complexity of Data Reviewed  External Data Reviewed: labs, radiology and notes.    Risk  OTC drugs.  Prescription drug management.        Procedure  Procedures     Nabeel Rodríguez MD  03/08/24 1916

## 2024-03-12 ENCOUNTER — OFFICE VISIT (OUTPATIENT)
Dept: PRIMARY CARE | Facility: CLINIC | Age: 44
End: 2024-03-12
Payer: COMMERCIAL

## 2024-03-12 VITALS
HEIGHT: 68 IN | WEIGHT: 293 LBS | BODY MASS INDEX: 44.41 KG/M2 | OXYGEN SATURATION: 97 % | SYSTOLIC BLOOD PRESSURE: 150 MMHG | HEART RATE: 64 BPM | TEMPERATURE: 97.7 F | DIASTOLIC BLOOD PRESSURE: 90 MMHG

## 2024-03-12 DIAGNOSIS — J01.00 ACUTE NON-RECURRENT MAXILLARY SINUSITIS: Primary | ICD-10-CM

## 2024-03-12 PROCEDURE — 1036F TOBACCO NON-USER: CPT | Performed by: FAMILY MEDICINE

## 2024-03-12 PROCEDURE — 3008F BODY MASS INDEX DOCD: CPT | Performed by: FAMILY MEDICINE

## 2024-03-12 PROCEDURE — 99213 OFFICE O/P EST LOW 20 MIN: CPT | Performed by: FAMILY MEDICINE

## 2024-03-12 RX ORDER — AZITHROMYCIN 500 MG/1
500 TABLET, FILM COATED ORAL DAILY
Qty: 5 TABLET | Refills: 0 | Status: SHIPPED | OUTPATIENT
Start: 2024-03-12 | End: 2024-03-17

## 2024-03-12 RX ORDER — PREDNISONE 10 MG/1
TABLET ORAL
Qty: 21 TABLET | Refills: 0 | Status: SHIPPED | OUTPATIENT
Start: 2024-03-12 | End: 2024-03-17

## 2024-03-12 ASSESSMENT — PATIENT HEALTH QUESTIONNAIRE - PHQ9
1. LITTLE INTEREST OR PLEASURE IN DOING THINGS: NOT AT ALL
SUM OF ALL RESPONSES TO PHQ9 QUESTIONS 1 AND 2: 0
2. FEELING DOWN, DEPRESSED OR HOPELESS: NOT AT ALL

## 2024-03-12 ASSESSMENT — PAIN SCALES - GENERAL: PAINLEVEL: 7

## 2024-03-12 NOTE — PROGRESS NOTES
"Subjective   Patient ID: Angeline Gurrola is a 44 y.o. female who presents for Follow-up ([facility]-migraine).    Nasal congestion postnasal drip rhinorrhea maxillary facial pain and pressure, sinus type headache         Review of Systems   Constitutional:  Negative for fever.        Also see HPI   Eyes:  Negative for visual disturbance.   Respiratory:  Negative for shortness of breath.    Cardiovascular:  Negative for chest pain.   Gastrointestinal:  Negative for diarrhea and nausea.   Endocrine: Negative.    Genitourinary:  Negative for difficulty urinating.   Skin:  Negative for rash.   Neurological:  Negative for dizziness.        No focal deficits   Psychiatric/Behavioral:  Negative for suicidal ideas.    All other systems reviewed and are negative.      Objective   /90   Pulse 64   Temp 36.5 °C (97.7 °F)   Ht 1.727 m (5' 8\")   Wt (!) 203 kg (448 lb 9.6 oz)   SpO2 97%   BMI 68.21 kg/m²     Physical Exam  Vitals and nursing note reviewed.   Constitutional:       Appearance: Normal appearance.   HENT:      Head: Normocephalic and atraumatic.      Comments: Bilateral frontal and maxillary sinus tenderness with palpation     Right Ear: Tympanic membrane normal.      Left Ear: Tympanic membrane normal.      Mouth/Throat:      Comments: Cobblestoning  Eyes:      Extraocular Movements: Extraocular movements intact.      Conjunctiva/sclera: Conjunctivae normal.   Cardiovascular:      Rate and Rhythm: Normal rate and regular rhythm.      Heart sounds: Normal heart sounds.   Pulmonary:      Effort: Pulmonary effort is normal.      Breath sounds: Normal breath sounds.      Comments: Lungs essentially CTA b/l  Abdominal:      General: There is no distension.      Palpations: Abdomen is soft. There is no mass.      Tenderness: There is no abdominal tenderness.   Musculoskeletal:      Right lower leg: No edema.      Left lower leg: No edema.   Skin:     Coloration: Skin is not jaundiced.      Findings: No rash. "   Neurological:      General: No focal deficit present.      Mental Status: She is alert and oriented to person, place, and time.   Psychiatric:         Mood and Affect: Mood normal.         Behavior: Behavior normal.         Thought Content: Thought content normal.         Judgment: Judgment normal.         Assessment/Plan   Diagnoses and all orders for this visit:  Acute non-recurrent maxillary sinusitis  -     azithromycin (Zithromax) 500 mg tablet; Take 1 tablet (500 mg) by mouth once daily for 5 days.  -     predniSONE (Deltasone) 10 mg tablet; Take 6 tablets (60 mg) by mouth once daily for 1 day, THEN 5 tablets (50 mg) once daily for 1 day, THEN 4 tablets (40 mg) once daily for 1 day, THEN 3 tablets (30 mg) once daily for 1 day, THEN 2 tablets (20 mg) once daily for 1 day, THEN 1 tablet (10 mg) once daily for 1 day.

## 2024-03-13 ENCOUNTER — OFFICE VISIT (OUTPATIENT)
Dept: NEUROLOGY | Facility: CLINIC | Age: 44
End: 2024-03-13
Payer: COMMERCIAL

## 2024-03-13 VITALS — HEART RATE: 72 BPM

## 2024-03-13 DIAGNOSIS — R51.9 CHRONIC DAILY HEADACHE: ICD-10-CM

## 2024-03-13 DIAGNOSIS — R53.83 FATIGUE, UNSPECIFIED TYPE: Primary | ICD-10-CM

## 2024-03-13 DIAGNOSIS — E53.8 VITAMIN B12 DEFICIENCY: ICD-10-CM

## 2024-03-13 DIAGNOSIS — G93.5 CHIARI I MALFORMATION (MULTI): ICD-10-CM

## 2024-03-13 DIAGNOSIS — H53.8 BLURRY VISION, BILATERAL: ICD-10-CM

## 2024-03-13 DIAGNOSIS — R26.81 UNSTEADY GAIT: ICD-10-CM

## 2024-03-13 PROCEDURE — 99205 OFFICE O/P NEW HI 60 MIN: CPT | Performed by: PSYCHIATRY & NEUROLOGY

## 2024-03-13 PROCEDURE — 1036F TOBACCO NON-USER: CPT | Performed by: PSYCHIATRY & NEUROLOGY

## 2024-03-13 PROCEDURE — 3008F BODY MASS INDEX DOCD: CPT | Performed by: PSYCHIATRY & NEUROLOGY

## 2024-03-13 RX ORDER — ERGOCALCIFEROL 1.25 MG/1
1.25 CAPSULE ORAL 2 TIMES WEEKLY
COMMUNITY
End: 2024-05-17 | Stop reason: SDUPTHER

## 2024-03-13 ASSESSMENT — ENCOUNTER SYMPTOMS
DIFFICULTY URINATING: 0
FEVER: 0
NAUSEA: 0
DIZZINESS: 0
SHORTNESS OF BREATH: 0
ENDOCRINE NEGATIVE: 1
DIARRHEA: 0

## 2024-03-13 NOTE — PATIENT INSTRUCTIONS
You have developed daily headaches with blurry vision as well as trouble walking with balance problems and falls. Your recent brain MRI showed that the based of your brain sticks into the opening at the base of your skull, but there is no evidence that it is obstructing fluid flow through your brain. Most likely you have been that way all of your life.    Your blood tests indicate that you are low in iron and vitamin B12. Please take iron 325 mg a day for two months and then ask Dr. Dailey to recheck your iron blood tests. Please start vitamin B12 1000 micrograms every day, which you will probably have to take for the rest of your life. Please also get a vitamin B12 shot 1000 micrograms into the muscle to boost your level quickly. Please get a blood test to check your thyroid.    You have been having daily headaches with blurry vision. We will have you consult a neuro-ophthalmologist because there is a condition called idiopathic intracranial hypertension that can cause increased pressure in your head and affect your vision.     Please call with questions. I will see you back in 3 months.

## 2024-03-13 NOTE — PROGRESS NOTES
Chief complaint:    Bad headaches and trouble walking    History of Present Illness:   Ms. Gurrola is a 43 yo right-handed woman who has developed bad headaches in the past 2 years. They usually start on the left side of her head or as a throbbing pain in the back of her head. They were so bad last week that she had to go to an ER. Sumatriptan has not helped.    She has developed blurry vision in her right eye. Her eye doctor found that her prescription for her right eye had changed. Her vision improved for two months and now her right eye is back to being blurry. Sometimes her left eye can get slightly blurry, but not as bad as on the right.    She has had trouble walking with balance problems. She has fallen 4 times in the past 2 months. Twice she was out putting something in the back of her car, felt dizzy and fell. Her left leg feels week. She can not lift the left leg like she can on the right. She has fallen because she misplaced her left foot on stairs. Her legs feel like they weigh a thousand pounds apiece. She has marked bilateral lymphedema of her legs, worse on the left, and uses compression devices for an hour twice a day.    She lost her job of 25 years in December because of her trouble walking. 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 She has IBS and has to take several medications before she eats, but still has abdominal pain and chronic diarrhea.     She has struggled with her weight and has slowly gained weight. She gained a lot of weight on steroids for back and hip pain in the past. She swims at the [employer] 4 days a week. She watches her calorie intake.     She has terrible insomnia. She has trouble falling asleep and staying asleep and gets about 4 hours of sleep a night. She said she has been that way since childhood. She has had panic attacks at night because several years ago her mother screamed in the middle of the night and Angeline called 911 and her mother ended up dying in her arms.  She is tired during the day. She may nap for 20 minutes. She avoids caffeine. She does not smoke and rarely has an alcoholic drink.    She is single and lives alone. She has no siblings. 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     Visit Vitals  Pulse 72        Physical examination:  She is a very pleasant woman who looks well except for morbid obesity and lymphedema of her legs. Her neck was supple with no carotid bruit. She had tenderness of her trapezius muscles.    Neurologic Exam     Mental Status   Her speech was clear, fluent and appropriate.     Cranial Nerves     CN II   Visual fields full to confrontation.     CN III, IV, VI   Pupils are equal, round, and reactive to light.  Extraocular motions are normal.     CN V   Facial sensation intact.     CN VII   Facial expression full, symmetric.     CN VIII   CN VIII normal.     CN IX, X   Palate: symmetric    CN XI   CN XI normal.     CN XII   CN XII normal.     Motor Exam   Muscle bulk: normal  Right arm tone: normal  Left arm tone: normal    Strength   Right deltoid: 5/5  Left deltoid: 5/5  Right biceps: 5/5  Left biceps: 5/5  Right interossei: 5/5  Left interossei: 5/5  Right iliopsoas: 5/5  Left iliopsoas: 4/5  Right anterior tibial: 5/5  Left anterior tibial: 5/5  Finger tapping was brisk bilaterally.  Rapid alternating movements and finger to nose were accurate bilaterally.  No abnormal movements.      Sensory Exam   Diminished sharp sensation on both feet, especially the lateral margin of the right foot. Sharp sensation was intact on the upper limbs.      Gait, Coordination, and Reflexes     Reflexes   Right brachioradialis: 2+  Left brachioradialis: 2+  Right biceps: 2+  Left biceps: 2+  Right triceps: 2+  Left triceps: 2+  Right patellar: 0  Left patellar: 0  Right achilles: 2+  Left achilles: 2+  Right plantar: normal  Left plantar: normal  She walked slowly with a steady, symmetric gait.          1. Fatigue, unspecified type  TSH  with reflex to Free T4 if abnormal      2. Chiari I malformation (CMS/HCC)  Referral to Neurology      3. Unsteady gait        4. Chronic daily headache  Referral to Ophthalmology      5. Vitamin B12 deficiency        6. Blurry vision, bilateral  Referral to Ophthalmology             Orders Placed This Encounter   Procedures    TSH with reflex to Free T4 if abnormal     Standing Status:   Future     Standing Expiration Date:   3/13/2025     Order Specific Question:   Release result to [facility]     Answer:   Immediate [1]    Referral to Ophthalmology     Standing Status:   Future     Standing Expiration Date:   9/13/2024     Referral Priority:   Routine     Referral Type:   Consultation     Referral Reason:   Specialty Services Required     Requested Specialty:   Neuro-Ophthalmology     Number of Visits Requested:   1          Impression and Plan:  Angeline has developed daily headaches and blurry vision. She has also developed unsteady gait and has fallen several times in the past two months. Her brain MRI showed a Chiari 1 malformation with an otherwise normal-appearing brain, with no evidence of hydrocephalus. Her blood work indicates iron and vitamin B12 deficiency, for which she will take supplements. She will also get one vitamin B12 injection from 's office. She will have her TSH checked.     Her weight gain and development of daily headaches and blurry vision is concerning for idiopathic intracranial hypertension. We will have her consult a neuro-ophthalmologist. Follow up here in 3 months.     Aubree Young MD

## 2024-03-15 ENCOUNTER — SPECIALTY PHARMACY (OUTPATIENT)
Dept: PHARMACY | Facility: CLINIC | Age: 44
End: 2024-03-15

## 2024-03-15 NOTE — RESULT ENCOUNTER NOTE
Heel spur is noted this could be associated with plantar fasciitis also.  No fracture, no significant degenerative changes

## 2024-03-20 ENCOUNTER — APPOINTMENT (OUTPATIENT)
Dept: PRIMARY CARE | Facility: CLINIC | Age: 44
End: 2024-03-20
Payer: COMMERCIAL

## 2024-03-20 ENCOUNTER — DOCUMENTATION (OUTPATIENT)
Dept: INFUSION THERAPY | Facility: CLINIC | Age: 44
End: 2024-03-20

## 2024-03-20 ENCOUNTER — LAB (OUTPATIENT)
Dept: LAB | Facility: LAB | Age: 44
End: 2024-03-20
Payer: COMMERCIAL

## 2024-03-20 ENCOUNTER — TELEPHONE (OUTPATIENT)
Dept: NEUROLOGY | Facility: CLINIC | Age: 44
End: 2024-03-20

## 2024-03-20 DIAGNOSIS — E53.8 VITAMIN B12 DEFICIENCY: ICD-10-CM

## 2024-03-20 DIAGNOSIS — R53.83 FATIGUE, UNSPECIFIED TYPE: ICD-10-CM

## 2024-03-20 LAB — TSH SERPL-ACNC: 2.63 MIU/L (ref 0.44–3.98)

## 2024-03-20 PROCEDURE — 36415 COLL VENOUS BLD VENIPUNCTURE: CPT

## 2024-03-20 RX ORDER — CYANOCOBALAMIN 1000 UG/ML
1000 INJECTION, SOLUTION INTRAMUSCULAR; SUBCUTANEOUS ONCE
Status: SHIPPED | OUTPATIENT
Start: 2024-03-20

## 2024-03-20 NOTE — TELEPHONE ENCOUNTER
I spoke to Angeline after her Tsh came back normal. She was able to get a B12 shot this morning and is taking oral B12 and iron supplements. She has an appt. with neuro-ophth 5/9. Follow up here scheduled after that.

## 2024-03-21 ENCOUNTER — DOCUMENTATION (OUTPATIENT)
Dept: INFUSION THERAPY | Facility: CLINIC | Age: 44
End: 2024-03-21
Payer: COMMERCIAL

## 2024-03-24 ENCOUNTER — DOCUMENTATION (OUTPATIENT)
Dept: INFUSION THERAPY | Facility: CLINIC | Age: 44
End: 2024-03-24
Payer: COMMERCIAL

## 2024-03-25 ENCOUNTER — APPOINTMENT (OUTPATIENT)
Dept: INFUSION THERAPY | Facility: CLINIC | Age: 44
End: 2024-03-25
Payer: COMMERCIAL

## 2024-03-26 ASSESSMENT — ENCOUNTER SYMPTOMS
DIFFICULTY URINATING: 0
FEVER: 0
DIZZINESS: 0
NAUSEA: 0
DIARRHEA: 0
SHORTNESS OF BREATH: 0
ENDOCRINE NEGATIVE: 1

## 2024-03-27 ENCOUNTER — DOCUMENTATION (OUTPATIENT)
Dept: INFUSION THERAPY | Facility: CLINIC | Age: 44
End: 2024-03-27
Payer: COMMERCIAL

## 2024-03-27 DIAGNOSIS — M45.9 ANKYLOSING SPONDYLITIS, UNSPECIFIED SITE OF SPINE (MULTI): Primary | ICD-10-CM

## 2024-03-27 NOTE — PROGRESS NOTES
"  Patient to be scheduled for New Start of Cosentyx Infusions    For a diagnosis of: Ankylosing Spondylitis     Dosing is weight based at: Flat Dose  1.75 mg /kg ordered. Max dose 300 mg    For a total dose of: 300 mg at every 28 days    (Do not exceed 300mg)    Labs…  TB drawn/results:   Lab Results   Component Value Date    TBSIN Negative 03/06/2024    QFG NEGATIVE 05/23/2018      Hep B drawn/results: No results found for: \"HAGCN\", \"HEPBSURABI\", \"HBSAG\", \"XHAGF\", \"HEPBSAG\", \"EXTHEPBSAG\", \"NONUHSWGH\"   Hep C Virus antibody: No results found for: \"HEPCABINIT\", \"HEPCINTERP\", \"HEPCAB\"   No results found for: \"HEPATOT\", \"HEPAIGM\", \"HEPBCIGM\", \"HEPBCAB\", \"HBEAG\", \"HEPCAB\"   CBC-D drawn: (baseline)   Lab Results   Component Value Date    WBC 8.9 03/06/2024    HGB 12.4 03/06/2024    HCT 42.0 03/06/2024    MCV 87 03/06/2024     03/06/2024      CMP drawn: (baseline)     Chemistry    Lab Results   Component Value Date/Time     12/05/2023 1524    K 4.4 12/05/2023 1524     12/05/2023 1524    CO2 30 12/05/2023 1524    BUN 8 12/05/2023 1524    CREATININE 0.64 12/05/2023 1524    Lab Results   Component Value Date/Time    CALCIUM 9.4 12/05/2023 1524    ALKPHOS 171 (H) 12/05/2023 1524    AST 19 12/05/2023 1524    ALT 18 12/05/2023 1524    BILITOT 0.4 12/05/2023 1524           CRP drawn: (baseline)   Lab Results   Component Value Date    CRP 1.81 (H) 03/06/2024        Urine Hcg test ordered piror to first infusion? Yes (If female pt <60 years of age and with reproductive capability)   (If urine Hcg test ordered please instruct pt upon scheduling to drink 32 ounces of water 1 hour before arrival so bladder is full for needed urine sample)    Patient should be up to date on all age appropriate vaccinations prior to initiation of treatment. Live vaccines should not be administered within 4 weeks prior to starting therapy.   Immunization History   Administered Date(s) Administered    Influenza, seasonal, injectable " 12/22/2011    Pfizer Purple Cap SARS-CoV-2 04/07/2021, 04/28/2021, 12/14/2021    Td (adult), unspecified 01/17/1997    Td vaccine, age 7 years and older (TDVAX) 09/01/2009       History of malignancy or IBS? No (may exacerbate)   Patient Active Problem List   Diagnosis    Ankylosing spondylitis (CMS/[facility])      Past Medical History:   Diagnosis Date    Ankylosing spondylitis (CMS/[facility])     Anxiety 2015    Asthma     Depression 01/01/2016    Difficulty walking 2016    Encounter for therapeutic drug level monitoring 05/23/2018    Encounter for methotrexate monitoring    GERD (gastroesophageal reflux disease) 2002    Headache 2023    Hypertension 2014    Insomnia 2011    Migraine 11/01/2023    Other obesity due to excess calories     Exogenous obesity    Personal history of other diseases of the circulatory system     History of peripheral vascular disease    Personal history of other diseases of the circulatory system     History of hypertension    Vision loss 10/01/2023    Visual impairment 2023    Weakness of limb 07/01/2020        Latex allergy? NO  (Some dosage forms may contain dry natural rubber (latex)).  Allergies   Allergen Reactions    Infliximab Anaphylaxis    Zolmitriptan Palpitations and Other     Chest pain    Penicillins Hives, Swelling and Rash    Sulfa (Sulfonamide Antibiotics) Hives and Swelling    Cephalexin GI Upset, Nausea/vomiting and Unknown    Doxycycline GI Upset        Last infusion received: new (if continuation)   Due: April 2024    Induction and Maintenance Therapy Plans entered if needed? Yes (if no prescribing provider notified of need to enter)    This result meets treatment criteria.  Hep B and C labs ordered.

## 2024-03-28 ENCOUNTER — LAB (OUTPATIENT)
Dept: LAB | Facility: LAB | Age: 44
End: 2024-03-28
Payer: COMMERCIAL

## 2024-03-28 DIAGNOSIS — M45.9 ANKYLOSING SPONDYLITIS, UNSPECIFIED SITE OF SPINE (MULTI): ICD-10-CM

## 2024-03-28 LAB
ALBUMIN SERPL BCP-MCNC: 3.8 G/DL (ref 3.4–5)
ALP SERPL-CCNC: 146 U/L (ref 33–110)
ALT SERPL W P-5'-P-CCNC: 15 U/L (ref 7–45)
ANION GAP SERPL CALC-SCNC: 13 MMOL/L (ref 10–20)
AST SERPL W P-5'-P-CCNC: 17 U/L (ref 9–39)
BASOPHILS # BLD AUTO: 0.07 X10*3/UL (ref 0–0.1)
BASOPHILS NFR BLD AUTO: 0.7 %
BILIRUB SERPL-MCNC: 0.4 MG/DL (ref 0–1.2)
BUN SERPL-MCNC: 12 MG/DL (ref 6–23)
CALCIUM SERPL-MCNC: 9.7 MG/DL (ref 8.6–10.6)
CHLORIDE SERPL-SCNC: 102 MMOL/L (ref 98–107)
CO2 SERPL-SCNC: 29 MMOL/L (ref 21–32)
CREAT SERPL-MCNC: 0.69 MG/DL (ref 0.5–1.05)
EGFRCR SERPLBLD CKD-EPI 2021: >90 ML/MIN/1.73M*2
EOSINOPHIL # BLD AUTO: 0.36 X10*3/UL (ref 0–0.7)
EOSINOPHIL NFR BLD AUTO: 3.7 %
ERYTHROCYTE [DISTWIDTH] IN BLOOD BY AUTOMATED COUNT: 15.1 % (ref 11.5–14.5)
GLUCOSE SERPL-MCNC: 100 MG/DL (ref 74–99)
HBV SURFACE AG SERPL QL IA: NONREACTIVE
HCT VFR BLD AUTO: 40.7 % (ref 36–46)
HCV AB SER QL: NONREACTIVE
HGB BLD-MCNC: 12.4 G/DL (ref 12–16)
IMM GRANULOCYTES # BLD AUTO: 0.04 X10*3/UL (ref 0–0.7)
IMM GRANULOCYTES NFR BLD AUTO: 0.4 % (ref 0–0.9)
LYMPHOCYTES # BLD AUTO: 2.44 X10*3/UL (ref 1.2–4.8)
LYMPHOCYTES NFR BLD AUTO: 25 %
MCH RBC QN AUTO: 26.2 PG (ref 26–34)
MCHC RBC AUTO-ENTMCNC: 30.5 G/DL (ref 32–36)
MCV RBC AUTO: 86 FL (ref 80–100)
MONOCYTES # BLD AUTO: 0.71 X10*3/UL (ref 0.1–1)
MONOCYTES NFR BLD AUTO: 7.3 %
NEUTROPHILS # BLD AUTO: 6.15 X10*3/UL (ref 1.2–7.7)
NEUTROPHILS NFR BLD AUTO: 62.9 %
NRBC BLD-RTO: 0 /100 WBCS (ref 0–0)
PLATELET # BLD AUTO: 380 X10*3/UL (ref 150–450)
POTASSIUM SERPL-SCNC: 4.4 MMOL/L (ref 3.5–5.3)
PROT SERPL-MCNC: 6.9 G/DL (ref 6.4–8.2)
RBC # BLD AUTO: 4.74 X10*6/UL (ref 4–5.2)
SODIUM SERPL-SCNC: 140 MMOL/L (ref 136–145)
WBC # BLD AUTO: 9.8 X10*3/UL (ref 4.4–11.3)

## 2024-03-28 PROCEDURE — 87340 HEPATITIS B SURFACE AG IA: CPT

## 2024-03-28 PROCEDURE — 86803 HEPATITIS C AB TEST: CPT

## 2024-03-28 PROCEDURE — 80053 COMPREHEN METABOLIC PANEL: CPT

## 2024-03-28 PROCEDURE — 85025 COMPLETE CBC W/AUTO DIFF WBC: CPT

## 2024-03-28 PROCEDURE — 36415 COLL VENOUS BLD VENIPUNCTURE: CPT

## 2024-04-01 ENCOUNTER — INFUSION (OUTPATIENT)
Dept: INFUSION THERAPY | Facility: CLINIC | Age: 44
End: 2024-04-01
Payer: COMMERCIAL

## 2024-04-01 VITALS
HEART RATE: 75 BPM | TEMPERATURE: 98.4 F | DIASTOLIC BLOOD PRESSURE: 68 MMHG | BODY MASS INDEX: 68.12 KG/M2 | RESPIRATION RATE: 18 BRPM | WEIGHT: 293 LBS | SYSTOLIC BLOOD PRESSURE: 143 MMHG | OXYGEN SATURATION: 99 %

## 2024-04-01 DIAGNOSIS — M45.9 ANKYLOSING SPONDYLITIS, UNSPECIFIED SITE OF SPINE (MULTI): ICD-10-CM

## 2024-04-01 PROCEDURE — 96365 THER/PROPH/DIAG IV INF INIT: CPT | Performed by: NURSE PRACTITIONER

## 2024-04-01 RX ORDER — FERROUS SULFATE 325(65) MG
325 TABLET ORAL 3 TIMES WEEKLY
COMMUNITY

## 2024-04-01 RX ORDER — ALBUTEROL SULFATE 0.83 MG/ML
3 SOLUTION RESPIRATORY (INHALATION) AS NEEDED
OUTPATIENT
Start: 2024-04-01

## 2024-04-01 RX ORDER — FAMOTIDINE 10 MG/ML
20 INJECTION INTRAVENOUS ONCE AS NEEDED
OUTPATIENT
Start: 2024-04-01

## 2024-04-01 RX ORDER — IBUPROFEN 100 MG/5ML
500 SUSPENSION, ORAL (FINAL DOSE FORM) ORAL DAILY
COMMUNITY
Start: 2024-03-13

## 2024-04-01 RX ORDER — EPINEPHRINE 0.3 MG/.3ML
0.3 INJECTION SUBCUTANEOUS EVERY 5 MIN PRN
OUTPATIENT
Start: 2024-04-01

## 2024-04-01 RX ORDER — DIPHENHYDRAMINE HYDROCHLORIDE 50 MG/ML
50 INJECTION INTRAMUSCULAR; INTRAVENOUS AS NEEDED
OUTPATIENT
Start: 2024-04-01

## 2024-04-01 ASSESSMENT — PAIN SCALES - GENERAL: PAINLEVEL: 8

## 2024-04-01 ASSESSMENT — ENCOUNTER SYMPTOMS
MYALGIAS: 1
ARTHRALGIAS: 1

## 2024-04-01 NOTE — PATIENT INSTRUCTIONS
Today :Angeline Gurrola had no medications administered during this visit.     For:   1. Ankylosing spondylitis, unspecified site of spine (CMS/[facility])         Your next appointment is due in:  28 days        Please read the  Medication Guide that was given to you and reviewed during todays visit.     (Tell all doctors including dentists that you are taking this medication)     Go to the emergency room or call 911 if:  -You have signs of allergic reaction:   -Rash, hives, itching.   -Swollen, blistered, peeling skin.   -Swelling of face, lips, mouth, tongue or throat.   -Tightness of chest, trouble breathing, swallowing or talking     Call your doctor:  - If IV / injection site gets red, warm, swollen, itchy or leaks fluid or pus.     (Leave dressing on your IV site for at least 2 hours and keep area clean and dry  - If you get sick or have symptoms of infection or are not feeling well for any reason.    (Wash your hands often, stay away from people who are sick)  - If you have side effects from your medication that do not go away or are bothersome.     (Refer to the teaching your nurse gave you for side effects to call your doctor about)    - Common side effects may include:  stuffy nose, headache, feeling tired, muscle aches, upset stomach  - Before receiving any vaccines     - Call the [facility] at   If:  - You get sick, are on antibiotics, have had a recent vaccine, have surgery or dental work and your doctor wants your visit rescheduled.  - You need to cancel and reschedule your visit for any reason. Call at least 2 days before your visit if you need to cancel.   - Your insurance changes before your next visit.    (We will need to get approval from your new insurance. This can take up to two weeks.)     The [facility] is opened Monday thru Friday. We are closed on weekends and holidays.   Voice mail will take your call if the center is closed. If you leave a message please allow 24  hours for a call back during weekdays. If you leave a message on a weekend/holiday, we will call you back the next business day.

## 2024-04-01 NOTE — PROGRESS NOTES
[facility]   Infusion Clinic Note   Date: 2024   Name: Angeline Gurrola  : 1980   MRN: [id number]         Reason for Visit:  OP Infusion Cosentyx      Visit Type: INFUSION       Ordered By: Dr rdz      Accompanied by:Self      Diagnosis: Ankylosing spondylitis, unspecified site of spine (CMS/[facility])       Allergies:   Allergies as of 2024 - Reviewed 2024   Allergen Reaction Noted    Infliximab Anaphylaxis 07/15/2021    Zolmitriptan Palpitations and Other 2013    Penicillins Hives, Swelling, and Rash 2013    Sulfa (sulfonamide antibiotics) Hives and Swelling 2013    Cephalexin GI Upset, Nausea/vomiting, and Unknown 2013    Doxycycline GI Upset 2022         Current Medications has a current medication list which includes the following prescription(s): albuterol, alprazolam, ascorbic acid, azelastine, chlordiazepoxide-clidinium, diclofenac-misoprostol, diphenoxylate-atropine, ergocalciferol, famotidine, ferrous sulfate (325 mg ferrous sulfate), fluticasone, hyoscyamine, losartan-hydrochlorothiazide, metoclopramide, nebivolol, nortriptyline, ondansetron, secukinumab, sumatriptan, venlafaxine xr, venlafaxine xr, metolazone, and montelukast, and the following Facility-Administered Medications: cyanocobalamin and secukinumab (Cosentyx) 1,225 mg in sodium chloride 0.9% 100 mL IVPB.       Vitals:   Vitals:    24 1201   BP: 134/68   Pulse: 74   Resp: 20   Temp: 36.9 °C (98.4 °F)   SpO2: 99%   Weight: (!) 203 kg (448 lb)   PainSc:   8   PainLoc: Generalized             Infusion Pre-procedure Checklist:   - Allergies reviewed: yes   - Medications reviewed: yes       - Previous reaction to current treatment: no      Assess patient for the concerns below. Document provider notification as appropriate.  - Active or recent infection with/without current antibiotic use: no  - Recent or planned invasive dental work: no  - Recent or planned  surgeries: no  - Recently received or plans to receive vaccinations: no  - Has treatment related toxicities: no  - Is pregnant:  no, declined pregnancy test signed waiver      Pain: 8   - Is the pain different from normal: no   - Is the pain tolerable: yes   - Is your Doctor aware:  yes      Labs: N/A         Fall Risk Screening: Kraus Fall Risk  History of Falling, Immediate or Within 3 Months: Yes  Secondary Diagnosis: No  Ambulatory Aid: Walks without aid/bedrest/nurse assist  Intravenous Therapy/Heparin Lock: No  Gait/Transferring: Normal/bedrest/immobile  Mental Status: Oriented to own ability  Kraus Fall Risk Score: 25       Review Of Systems:  Review of Systems   Musculoskeletal:  Positive for arthralgias and myalgias.   All other systems reviewed and are negative.        Infusion Readiness:   - Assessment Concerns Related to Infusion: No  - Provider notified: n/a      Document Below Only If Indicated:   New Patient Education:    NEW PATIENT MEDICATION EDUCATION PT PROVIDED WITH WRITTEN ([employer] PT EDUCATION SHEET) AND VERBAL EDUCATION REGARDING MEDICATION GIVEN. VERIFIED MEDICATION NAME WITH PATIENT AND DISCUSSED REASON FOR USE. BRIEFLY DISCUSSED HOW MEDICATION WORKS AND EDUCATED ON GOAL OF TREATMENT, FREQUENCY OF TREATMENT, ADVERSE RXN'S AND COMMON SIDE EFFECTS TO MONITOR FOR. INSTRUCTED PT TO ASSURE THAT ALL PROVIDERS INCLUDING DENTISTS ARE AWARE OF MEDICATION RECEIVED. DISCUSSED FLOW OF VISIT AND ORIENTED TO INFUSION CENTER. PT VERBALIZES UNDERSTANDING. CALL LIGHT PROVIDED AND PT AWARE TO ALERT STAFF OF ANY CONCERNS DURING TREATMENT.        Treatment Conditions & Drug Specific Questions:            Weight Based Drug Calculation    Yes  Dosage weight 202kg  Todays weight 203kg  10% variance 181.8-222.2kg  Within variance-yes         Initiated By: Daisy Smith RN   Time: 12:52 PM     We administered (secukinumab (Cosentyx) 1,225 mg in sodium chloride 0.9% 100 mL IVPB).    1314-IV line flushed with 50ml  Normal Saline. Patient tolerated infusion well. No signs or symptoms of reaction noted.  1345-30 minutes post infusion vital signs stable. Patient tolerated well, no signs or symptoms of reaction.

## 2024-04-10 DIAGNOSIS — F41.9 ANXIETY: ICD-10-CM

## 2024-04-21 RX ORDER — ALPRAZOLAM 1 MG/1
1 TABLET ORAL DAILY PRN
Qty: 30 TABLET | Refills: 1 | Status: SHIPPED | OUTPATIENT
Start: 2024-04-21 | End: 2024-05-17 | Stop reason: SDUPTHER

## 2024-04-29 ENCOUNTER — APPOINTMENT (OUTPATIENT)
Dept: INFUSION THERAPY | Facility: CLINIC | Age: 44
End: 2024-04-29
Payer: COMMERCIAL

## 2024-05-06 ENCOUNTER — INFUSION (OUTPATIENT)
Dept: INFUSION THERAPY | Facility: CLINIC | Age: 44
End: 2024-05-06
Payer: COMMERCIAL

## 2024-05-06 DIAGNOSIS — M45.9 ANKYLOSING SPONDYLITIS, UNSPECIFIED SITE OF SPINE (MULTI): ICD-10-CM

## 2024-05-06 RX ORDER — DIPHENHYDRAMINE HYDROCHLORIDE 50 MG/ML
50 INJECTION INTRAMUSCULAR; INTRAVENOUS AS NEEDED
Status: CANCELLED | OUTPATIENT
Start: 2024-06-03

## 2024-05-06 RX ORDER — EPINEPHRINE 0.3 MG/.3ML
0.3 INJECTION SUBCUTANEOUS EVERY 5 MIN PRN
Status: CANCELLED | OUTPATIENT
Start: 2024-06-03

## 2024-05-06 RX ORDER — ALBUTEROL SULFATE 0.83 MG/ML
3 SOLUTION RESPIRATORY (INHALATION) AS NEEDED
Status: CANCELLED | OUTPATIENT
Start: 2024-06-03

## 2024-05-06 RX ORDER — FAMOTIDINE 10 MG/ML
20 INJECTION INTRAVENOUS ONCE AS NEEDED
Status: CANCELLED | OUTPATIENT
Start: 2024-06-03

## 2024-05-06 NOTE — PROGRESS NOTES
[facility]   Infusion Clinic Note   Date: May 6, 2024   Name: Agneline Gurrola  : 1980   MRN: [id number]         Reason for Visit: No chief complaint on file.         Visit Type: INFUSION       Ordered By: Elver      Accompanied by:Self      Diagnosis: No diagnosis found.       Allergies:   Allergies as of 2024 - Reviewed 2024   Allergen Reaction Noted    Infliximab Anaphylaxis 07/15/2021    Zolmitriptan Palpitations and Other 2013    Penicillins Hives, Swelling, and Rash 2013    Sulfa (sulfonamide antibiotics) Hives and Swelling 2013    Cephalexin GI Upset, Nausea/vomiting, and Unknown 2013    Doxycycline GI Upset 2022         Current Medications has a current medication list which includes the following prescription(s): albuterol, alprazolam, ascorbic acid, azelastine, chlordiazepoxide-clidinium, diclofenac-misoprostol, diphenoxylate-atropine, ergocalciferol, famotidine, ferrous sulfate (325 mg ferrous sulfate), fluticasone, hyoscyamine, losartan-hydrochlorothiazide, metoclopramide, metolazone, montelukast, nebivolol, nortriptyline, ondansetron, secukinumab, sumatriptan, venlafaxine xr, and venlafaxine xr, and the following Facility-Administered Medications: cyanocobalamin.       Vitals:   There were no vitals filed for this visit.          Infusion Pre-procedure Checklist:   - Allergies reviewed: yes   - Medications reviewed: yes       - Previous reaction to current treatment: no      Assess patient for the concerns below. Document provider notification as appropriate.  - Active or recent infection with/without current antibiotic use: no  - Recent or planned invasive dental work: no  - Recent or planned surgeries: no  - Recently received or plans to receive vaccinations: no  - Has treatment related toxicities: no  - Is pregnant:  no      Pain: 7   - Is the pain different from normal: no   - Is the pain tolerable: yes   - Is your Doctor  "aware:  yes      Labs: N/A         Fall Risk Screening:         Review Of Systems:  Review of Systems   All other systems reviewed and are negative.        Infusion Readiness:   - Assessment Concerns Related to Infusion: No  - Provider notified: no      Document Below Only If Indicated:   New Patient Education:    N/A (returning patient for continuation of therapy. Ongoing education provided as needed.)        Treatment Conditions & Drug Specific Questions:    Secukinumab (COSENTYX)    (Unless otherwise specified on patient specific therapy plan):     TREATMENT CONDITIONS:  Unless otherwise specified on patient specific therapy plan HOLD and notify Provider prior to proceeding with today's infusion if patient with:  o Positive T-Spot  o Reactive Hep B and Hep C virus antibody  o Positive pregnancy prior to first treatment in women of childbearing ability    Lab Results   Component Value Date    TBSIN Negative 03/06/2024    QFG NEGATIVE 05/23/2018      Lab Results   Component Value Date    HEPBSAG Nonreactive 03/28/2024      No results found for: \"NONUHFIRE\", \"NONUHSWGH\", \"NONUHFISH\", \"EXTHEPBSAG\"  No results found for: \"HBCTI\", \"HEPBCAB\"  Lab Results   Component Value Date    HEPCAB Nonreactive 03/28/2024      Lab Results   Component Value Date    HEPCAB Nonreactive 03/28/2024       If available for review: CBC-D; CMP; CRP; LFT  Lab Results   Component Value Date    WBC 9.8 03/28/2024    HGB 12.4 03/28/2024    HCT 40.7 03/28/2024    MCV 86 03/28/2024     03/28/2024        Chemistry    Lab Results   Component Value Date/Time     03/28/2024 1314    K 4.4 03/28/2024 1314     03/28/2024 1314    CO2 29 03/28/2024 1314    BUN 12 03/28/2024 1314    CREATININE 0.69 03/28/2024 1314    Lab Results   Component Value Date/Time    CALCIUM 9.7 03/28/2024 1314    ALKPHOS 146 (H) 03/28/2024 1314    AST 17 03/28/2024 1314    ALT 15 03/28/2024 1314    BILITOT 0.4 03/28/2024 1314           Lab Results   Component " Value Date    CRP 1.81 (H) 03/06/2024      Lab Results   Component Value Date    ALT 15 03/28/2024    AST 17 03/28/2024    ALKPHOS 146 (H) 03/28/2024    BILITOT 0.4 03/28/2024        Labs reviewed and patient meets treatment conditions? Yes    DRUG SPECIFIC QUESTIONS:  Are you up to date on all of your immunizations? Yes    Do you have a history of irritable bowel disease? No          (Treatment may cause exacerbations and/or new onset of inflammatory bowel of inflammatory bowel disease)    Any new or recent diagnosis of cancer, specifically skin cancer?No          (If yes notify provider prior to proceeding)    Are you allergic to Latex? No         (Some dosage forms may contain dry natural rubber (latex)).    REMINDER:   WEIGHT BASED DRUG   PREGNANCY TEST PRIOR TO FIRST INFUSION FOR WOMEN OF CHILDBEARING ABILITY.    Recommended Vitals/Observation:  Vitals: Obtain vital signs at start and end of infusion, and as needed.  Observation: No observation.         Weight Based Drug Calculations:    WEIGHT BASED DRUGS: NOT APPLICABLE / FLAT DOSE          Initiated By: Miya Miller RN   Time: 12:01 PM     Angeline Gurrola had no medications administered during this visit.

## 2024-05-07 ENCOUNTER — INFUSION (OUTPATIENT)
Dept: INFUSION THERAPY | Facility: CLINIC | Age: 44
End: 2024-05-07
Payer: COMMERCIAL

## 2024-05-07 VITALS
DIASTOLIC BLOOD PRESSURE: 57 MMHG | HEART RATE: 59 BPM | OXYGEN SATURATION: 97 % | WEIGHT: 293 LBS | RESPIRATION RATE: 16 BRPM | BODY MASS INDEX: 68.29 KG/M2 | SYSTOLIC BLOOD PRESSURE: 123 MMHG

## 2024-05-07 DIAGNOSIS — M45.9 ANKYLOSING SPONDYLITIS, UNSPECIFIED SITE OF SPINE (MULTI): ICD-10-CM

## 2024-05-07 PROCEDURE — 96365 THER/PROPH/DIAG IV INF INIT: CPT | Performed by: NURSE PRACTITIONER

## 2024-05-07 RX ORDER — DIPHENHYDRAMINE HYDROCHLORIDE 50 MG/ML
50 INJECTION INTRAMUSCULAR; INTRAVENOUS AS NEEDED
Status: CANCELLED | OUTPATIENT
Start: 2024-06-03

## 2024-05-07 RX ORDER — FAMOTIDINE 10 MG/ML
20 INJECTION INTRAVENOUS ONCE AS NEEDED
Status: CANCELLED | OUTPATIENT
Start: 2024-06-03

## 2024-05-07 RX ORDER — ALBUTEROL SULFATE 0.83 MG/ML
3 SOLUTION RESPIRATORY (INHALATION) AS NEEDED
Status: CANCELLED | OUTPATIENT
Start: 2024-06-03

## 2024-05-07 RX ORDER — FAMOTIDINE 10 MG/ML
20 INJECTION INTRAVENOUS ONCE AS NEEDED
OUTPATIENT
Start: 2024-05-07

## 2024-05-07 RX ORDER — ALBUTEROL SULFATE 0.83 MG/ML
3 SOLUTION RESPIRATORY (INHALATION) AS NEEDED
OUTPATIENT
Start: 2024-05-07

## 2024-05-07 RX ORDER — DIPHENHYDRAMINE HYDROCHLORIDE 50 MG/ML
50 INJECTION INTRAMUSCULAR; INTRAVENOUS AS NEEDED
OUTPATIENT
Start: 2024-05-07

## 2024-05-07 RX ORDER — EPINEPHRINE 0.3 MG/.3ML
0.3 INJECTION SUBCUTANEOUS EVERY 5 MIN PRN
OUTPATIENT
Start: 2024-05-07

## 2024-05-07 RX ORDER — EPINEPHRINE 0.3 MG/.3ML
0.3 INJECTION SUBCUTANEOUS EVERY 5 MIN PRN
Status: CANCELLED | OUTPATIENT
Start: 2024-06-03

## 2024-05-07 ASSESSMENT — PAIN SCALES - GENERAL: PAINLEVEL: 7

## 2024-05-07 ASSESSMENT — ENCOUNTER SYMPTOMS
MYALGIAS: 1
ARTHRALGIAS: 1

## 2024-05-07 NOTE — PROGRESS NOTES
[facility]   Infusion Clinic Note   Date: May 7, 2024   Name: Angeline Gurrola  : 1980   MRN: [id number]         Reason for Visit:  OP Infusion Cosentyx      Visit Type: INFUSION       Ordered By: Dr rdz      Accompanied by:Self      Diagnosis: Ankylosing spondylitis, unspecified site of spine (Multi)       Allergies:   Allergies as of 2024 - Reviewed 2024   Allergen Reaction Noted    Infliximab Anaphylaxis 07/15/2021    Zolmitriptan Palpitations and Other 2013    Penicillins Hives, Swelling, and Rash 2013    Sulfa (sulfonamide antibiotics) Hives and Swelling 2013    Cephalexin GI Upset, Nausea/vomiting, and Unknown 2013    Doxycycline GI Upset 2022         Current Medications has a current medication list which includes the following prescription(s): albuterol, alprazolam, ascorbic acid, azelastine, chlordiazepoxide-clidinium, diclofenac-misoprostol, diphenoxylate-atropine, ergocalciferol, famotidine, ferrous sulfate (325 mg ferrous sulfate), fluticasone, hyoscyamine, losartan-hydrochlorothiazide, metoclopramide, metolazone, montelukast, nebivolol, nortriptyline, ondansetron, secukinumab, sumatriptan, venlafaxine xr, and venlafaxine xr, and the following Facility-Administered Medications: cyanocobalamin, secukinumab (Cosentyx) 300 mg in sodium chloride 0.9% 100 mL IVPB, and secukinumab (Cosentyx) 300 mg in sodium chloride 0.9% 100 mL IVPB.       Vitals:   Vitals:    24 1206   BP: 162/64   Pulse: 65   Resp: 16   SpO2: 96%   Weight: (!) 204 kg (449 lb 1.6 oz)   PainSc: 7  Comment: bilat legs             Infusion Pre-procedure Checklist:   - Allergies reviewed: yes   - Medications reviewed: yes       - Previous reaction to current treatment: no      Assess patient for the concerns below. Document provider notification as appropriate.  - Active or recent infection with/without current antibiotic use: no  - Recent or planned invasive dental  work: no  - Recent or planned surgeries: no  - Recently received or plans to receive vaccinations: no  - Has treatment related toxicities: no  - Is pregnant:  no, declined pregnancy test signed waiver      Pain: 8   - Is the pain different from normal: no   - Is the pain tolerable: yes   - Is your Doctor aware:  yes      Labs: N/A         Fall Risk Screening: Kraus Fall Risk  History of Falling, Immediate or Within 3 Months: Yes  Secondary Diagnosis: No  Ambulatory Aid: Walks without aid/bedrest/nurse assist  Intravenous Therapy/Heparin Lock: Yes  Gait/Transferring: Normal/bedrest/immobile  Mental Status: Oriented to own ability  Kraus Fall Risk Score: 45       Review Of Systems:  Review of Systems   Musculoskeletal:  Positive for arthralgias and myalgias.   All other systems reviewed and are negative.        Infusion Readiness:   - Assessment Concerns Related to Infusion: No  - Provider notified: n/a      Document Below Only If Indicated:   New Patient Education:    NEW PATIENT MEDICATION EDUCATION PT PROVIDED WITH WRITTEN ([facility] PT EDUCATION SHEET) AND VERBAL EDUCATION REGARDING MEDICATION GIVEN. VERIFIED MEDICATION NAME WITH PATIENT AND DISCUSSED REASON FOR USE. BRIEFLY DISCUSSED HOW MEDICATION WORKS AND EDUCATED ON GOAL OF TREATMENT, FREQUENCY OF TREATMENT, ADVERSE RXN'S AND COMMON SIDE EFFECTS TO MONITOR FOR. INSTRUCTED PT TO ASSURE THAT ALL PROVIDERS INCLUDING DENTISTS ARE AWARE OF MEDICATION RECEIVED. DISCUSSED FLOW OF VISIT AND ORIENTED TO INFUSION CENTER. PT VERBALIZES UNDERSTANDING. CALL LIGHT PROVIDED AND PT AWARE TO ALERT STAFF OF ANY CONCERNS DURING TREATMENT.        Treatment Conditions & Drug Specific Questions:            Weight Based Drug Calculation    Yes  Dosage weight 202kg  Todays weight 204kg  10% variance 181.8-222.2kg  Within variance-yes         Initiated By: Lakeisha Garcia RN   Time: 12:39 PM     We administered secukinumab (Cosentyx) 300 mg in sodium chloride 0.9% 100 mL IVPB.     1314-IV line flushed with 50ml Normal Saline. Patient tolerated infusion well. No signs or symptoms of reaction noted.  1345-30 minutes post infusion vital signs stable. Patient tolerated well, no signs or symptoms of reaction.

## 2024-05-07 NOTE — PATIENT INSTRUCTIONS
Today :We administered secukinumab (Cosentyx) 300 mg in sodium chloride 0.9% 100 mL IVPB.     For:   1. Ankylosing spondylitis, unspecified site of spine (Multi)         Your next appointment is due in:  6/4/2024       Please read the  Medication Guide that was given to you and reviewed during todays visit.     (Tell all doctors including dentists that you are taking this medication)     Go to the emergency room or call 911 if:  -You have signs of allergic reaction:   -Rash, hives, itching.   -Swollen, blistered, peeling skin.   -Swelling of face, lips, mouth, tongue or throat.   -Tightness of chest, trouble breathing, swallowing or talking     Call your doctor:  - If IV / injection site gets red, warm, swollen, itchy or leaks fluid or pus.     (Leave dressing on your IV site for at least 2 hours and keep area clean and dry  - If you get sick or have symptoms of infection or are not feeling well for any reason.    (Wash your hands often, stay away from people who are sick)  - If you have side effects from your medication that do not go away or are bothersome.     (Refer to the teaching your nurse gave you for side effects to call your doctor about)    - Common side effects may include:  stuffy nose, headache, feeling tired, muscle aches, upset stomach  - Before receiving any vaccines     - Call [facility] at   If:  - You get sick, are on antibiotics, have had a recent vaccine, have surgery or dental work and your doctor wants your visit rescheduled.  - You need to cancel and reschedule your visit for any reason. Call at least 2 days before your visit if you need to cancel.   - Your insurance changes before your next visit.    (We will need to get approval from your new insurance. This can take up to two weeks.)     The [facility] is opened Monday thru Friday. We are closed on weekends and holidays.   Voice mail will take your call if the center is closed. If you leave a message  please allow 24 hours for a call back during weekdays. If you leave a message on a weekend/holiday, we will call you back the next business day.

## 2024-05-08 ENCOUNTER — TELEPHONE (OUTPATIENT)
Dept: OPHTHALMOLOGY | Facility: HOSPITAL | Age: 44
End: 2024-05-08

## 2024-05-08 NOTE — TELEPHONE ENCOUNTER
I called the pt to move her appt since Dr. Hamilton's schedule is overbooked. I called the other day and left a vm and called again today.

## 2024-05-09 ENCOUNTER — OFFICE VISIT (OUTPATIENT)
Dept: OPHTHALMOLOGY | Facility: CLINIC | Age: 44
End: 2024-05-09
Payer: COMMERCIAL

## 2024-05-09 DIAGNOSIS — R51.9 CHRONIC DAILY HEADACHE: ICD-10-CM

## 2024-05-09 DIAGNOSIS — G93.2 IIH (IDIOPATHIC INTRACRANIAL HYPERTENSION): Primary | ICD-10-CM

## 2024-05-09 DIAGNOSIS — H53.8 BLURRY VISION, BILATERAL: ICD-10-CM

## 2024-05-09 PROCEDURE — 92133 CPTRZD OPH DX IMG PST SGM ON: CPT | Performed by: PSYCHIATRY & NEUROLOGY

## 2024-05-09 PROCEDURE — 99204 OFFICE O/P NEW MOD 45 MIN: CPT | Performed by: PSYCHIATRY & NEUROLOGY

## 2024-05-09 PROCEDURE — 92083 EXTENDED VISUAL FIELD XM: CPT | Performed by: PSYCHIATRY & NEUROLOGY

## 2024-05-09 ASSESSMENT — CONF VISUAL FIELD
OS_INFERIOR_NASAL_RESTRICTION: 0
OS_SUPERIOR_NASAL_RESTRICTION: 0
OD_SUPERIOR_NASAL_RESTRICTION: 0
OD_INFERIOR_NASAL_RESTRICTION: 0
OS_INFERIOR_TEMPORAL_RESTRICTION: 0
METHOD: COUNTING FINGERS
OD_SUPERIOR_TEMPORAL_RESTRICTION: 0
OD_INFERIOR_TEMPORAL_RESTRICTION: 0
OS_NORMAL: 1
OD_NORMAL: 1
OS_SUPERIOR_TEMPORAL_RESTRICTION: 0

## 2024-05-09 ASSESSMENT — VISUAL ACUITY
CORRECTION_TYPE: CONTACTS
OD_CC: 20/25+1
OS_CC: 20/20-1
OD_PH_CC: 20/20
METHOD: SNELLEN - LINEAR

## 2024-05-09 ASSESSMENT — ENCOUNTER SYMPTOMS
CARDIOVASCULAR NEGATIVE: 0
NEUROLOGICAL NEGATIVE: 0
MUSCULOSKELETAL NEGATIVE: 0
HEMATOLOGIC/LYMPHATIC NEGATIVE: 0
ALLERGIC/IMMUNOLOGIC NEGATIVE: 0
CONSTITUTIONAL NEGATIVE: 0
RESPIRATORY NEGATIVE: 0
ENDOCRINE NEGATIVE: 0
EYES NEGATIVE: 1
PSYCHIATRIC NEGATIVE: 0
GASTROINTESTINAL NEGATIVE: 0

## 2024-05-09 ASSESSMENT — SLIT LAMP EXAM - LIDS
COMMENTS: NORMAL
COMMENTS: NORMAL

## 2024-05-09 ASSESSMENT — TONOMETRY
OS_IOP_MMHG: 14
OD_IOP_MMHG: 12
IOP_METHOD: GOLDMANN APPLANATION

## 2024-05-09 ASSESSMENT — EXTERNAL EXAM - RIGHT EYE: OD_EXAM: NORMAL

## 2024-05-09 ASSESSMENT — CUP TO DISC RATIO
OS_RATIO: 0.1
OD_RATIO: 0.1

## 2024-05-09 ASSESSMENT — EXTERNAL EXAM - LEFT EYE: OS_EXAM: NORMAL

## 2024-05-09 NOTE — PROGRESS NOTES
Assessment and Plan    12/18/2023 MRI brain w/wo contrast, which I personally reviewed, shows cerebellar tonsillar herniation consistent with Chiari I. By radiology read also noting “7-8 mm meningioma in the right paramedian location overlying superior right frontal lobe”   09/17/2012 MRI brain w/wo contrast, which I personally reviewed, shows meningioma and Chiari and by report from [facility]  “IMPRESSION: 1. No acute intracranial abnormality is identified. Negative for acute infarct or enhancing mass. 2. Findings consistent with Chiari one malformation. 3. 1 cm probable calcified right frontal meningioma.”   07/20/2012 CT head without contrast, which I personally reviewed, shows no lesion. By report from [facility] “Unremarkable CT brain without contrast”     03/28/2024 CBC Hgb 12.4, previously as low as 11.8 in 09/15/2023 and   03/20/2024 ESR HIGH 31, CRP HIGH 1.81 mg/dL (<1.00 mg/dL.   03/20/2024 TSH wnl 2.63, previously wnl  03/20/2024 Tspot negative, previously negative. Previous Quanitferon TB 6/13/2018 negative   09/21/2023 Anti Mitochondrial Ab negative    05/09/2024 OCT RNFL OD 87 with I thinning & OS 78 with I thinning.    05/09/2024 HVF 24-2 OD fovea 36, general reduction MD -5.55 & OS fovea 39, general reduction, normal Pattern Deviation MD -9.78.    This 44 year-old woman with a history of morbid obesity, Chiari I malformation, ankylosing spondylitis on secukinumab,  iron deficiency, headaches, B12 deficiency undergoing supplementation, lymphedema, cholelithiasis status post cholecystectomy presents for evaluation of possible idiopathic intracranial hypertension.    She does not have idiopathic intracranial hypertension evidence on my examination given no optic disc edema. I do not recommend lumbar puncture or further evaluation for idiopathic intracranial hypertension although idiopathic intracranial hypertension without papilledema is a diagnosis that exists. I recommend primary headache  treatment.    Follow up with neurologist Dr. Young and primary eye care. (Dilated 5/9/2024)

## 2024-05-17 ENCOUNTER — LAB (OUTPATIENT)
Dept: LAB | Facility: LAB | Age: 44
End: 2024-05-17
Payer: COMMERCIAL

## 2024-05-17 ENCOUNTER — OFFICE VISIT (OUTPATIENT)
Dept: PRIMARY CARE | Facility: CLINIC | Age: 44
End: 2024-05-17
Payer: COMMERCIAL

## 2024-05-17 VITALS
HEART RATE: 56 BPM | TEMPERATURE: 97.9 F | SYSTOLIC BLOOD PRESSURE: 130 MMHG | WEIGHT: 293 LBS | OXYGEN SATURATION: 96 % | HEIGHT: 68 IN | DIASTOLIC BLOOD PRESSURE: 70 MMHG | BODY MASS INDEX: 44.41 KG/M2

## 2024-05-17 DIAGNOSIS — F41.9 ANXIETY: Primary | ICD-10-CM

## 2024-05-17 DIAGNOSIS — E55.9 VITAMIN D DEFICIENCY: ICD-10-CM

## 2024-05-17 DIAGNOSIS — G44.89 OTHER HEADACHE SYNDROME: ICD-10-CM

## 2024-05-17 DIAGNOSIS — R53.83 TIRED: ICD-10-CM

## 2024-05-17 DIAGNOSIS — R73.9 HYPERGLYCEMIA: ICD-10-CM

## 2024-05-17 DIAGNOSIS — M79.2 NEUROPATHIC PAIN: ICD-10-CM

## 2024-05-17 DIAGNOSIS — I89.0 LYMPHEDEMA: ICD-10-CM

## 2024-05-17 DIAGNOSIS — M54.9 DORSALGIA: ICD-10-CM

## 2024-05-17 LAB
ALBUMIN SERPL BCP-MCNC: 4.3 G/DL (ref 3.4–5)
ALP SERPL-CCNC: 135 U/L (ref 33–110)
ALT SERPL W P-5'-P-CCNC: 17 U/L (ref 7–45)
ANION GAP SERPL CALC-SCNC: 11 MMOL/L (ref 10–20)
AST SERPL W P-5'-P-CCNC: 18 U/L (ref 9–39)
BASOPHILS # BLD AUTO: 0.06 X10*3/UL (ref 0–0.1)
BASOPHILS NFR BLD AUTO: 0.9 %
BILIRUB SERPL-MCNC: 0.6 MG/DL (ref 0–1.2)
BUN SERPL-MCNC: 10 MG/DL (ref 6–23)
CALCIUM SERPL-MCNC: 9.7 MG/DL (ref 8.6–10.3)
CHLORIDE SERPL-SCNC: 103 MMOL/L (ref 98–107)
CO2 SERPL-SCNC: 29 MMOL/L (ref 21–32)
CREAT SERPL-MCNC: 0.67 MG/DL (ref 0.5–1.05)
EGFRCR SERPLBLD CKD-EPI 2021: >90 ML/MIN/1.73M*2
EOSINOPHIL # BLD AUTO: 0.31 X10*3/UL (ref 0–0.7)
EOSINOPHIL NFR BLD AUTO: 4.7 %
ERYTHROCYTE [DISTWIDTH] IN BLOOD BY AUTOMATED COUNT: 15.4 % (ref 11.5–14.5)
GLUCOSE SERPL-MCNC: 105 MG/DL (ref 74–99)
HCT VFR BLD AUTO: 40.9 % (ref 36–46)
HGB BLD-MCNC: 12.5 G/DL (ref 12–16)
IMM GRANULOCYTES # BLD AUTO: 0.02 X10*3/UL (ref 0–0.7)
IMM GRANULOCYTES NFR BLD AUTO: 0.3 % (ref 0–0.9)
LYMPHOCYTES # BLD AUTO: 2.1 X10*3/UL (ref 1.2–4.8)
LYMPHOCYTES NFR BLD AUTO: 31.7 %
MCH RBC QN AUTO: 26.1 PG (ref 26–34)
MCHC RBC AUTO-ENTMCNC: 30.6 G/DL (ref 32–36)
MCV RBC AUTO: 85 FL (ref 80–100)
MONOCYTES # BLD AUTO: 0.5 X10*3/UL (ref 0.1–1)
MONOCYTES NFR BLD AUTO: 7.5 %
NEUTROPHILS # BLD AUTO: 3.64 X10*3/UL (ref 1.2–7.7)
NEUTROPHILS NFR BLD AUTO: 54.9 %
NRBC BLD-RTO: 0 /100 WBCS (ref 0–0)
PLATELET # BLD AUTO: 333 X10*3/UL (ref 150–450)
POTASSIUM SERPL-SCNC: 4.3 MMOL/L (ref 3.5–5.3)
PROT SERPL-MCNC: 6.9 G/DL (ref 6.4–8.2)
RBC # BLD AUTO: 4.79 X10*6/UL (ref 4–5.2)
SODIUM SERPL-SCNC: 139 MMOL/L (ref 136–145)
TSH SERPL-ACNC: 1.03 MIU/L (ref 0.44–3.98)
WBC # BLD AUTO: 6.6 X10*3/UL (ref 4.4–11.3)

## 2024-05-17 PROCEDURE — 84207 ASSAY OF VITAMIN B-6: CPT

## 2024-05-17 PROCEDURE — 1036F TOBACCO NON-USER: CPT | Performed by: FAMILY MEDICINE

## 2024-05-17 PROCEDURE — 83036 HEMOGLOBIN GLYCOSYLATED A1C: CPT

## 2024-05-17 PROCEDURE — 82607 VITAMIN B-12: CPT

## 2024-05-17 PROCEDURE — 99214 OFFICE O/P EST MOD 30 MIN: CPT | Performed by: FAMILY MEDICINE

## 2024-05-17 PROCEDURE — 82306 VITAMIN D 25 HYDROXY: CPT

## 2024-05-17 PROCEDURE — 3008F BODY MASS INDEX DOCD: CPT | Performed by: FAMILY MEDICINE

## 2024-05-17 PROCEDURE — 36415 COLL VENOUS BLD VENIPUNCTURE: CPT

## 2024-05-17 RX ORDER — ALPRAZOLAM 1 MG/1
1 TABLET ORAL DAILY PRN
Qty: 30 TABLET | Refills: 2 | Status: SHIPPED | OUTPATIENT
Start: 2024-05-17

## 2024-05-17 RX ORDER — ERGOCALCIFEROL 1.25 MG/1
50000 CAPSULE ORAL 2 TIMES WEEKLY
Qty: 8 CAPSULE | Refills: 5 | Status: SHIPPED | OUTPATIENT
Start: 2024-05-20

## 2024-05-17 ASSESSMENT — ENCOUNTER SYMPTOMS
NAUSEA: 0
DIZZINESS: 0
ENDOCRINE NEGATIVE: 1
LEG PAIN: 1
DIARRHEA: 0
SHORTNESS OF BREATH: 0
DIFFICULTY URINATING: 0
LOSS OF SENSATION IN FEET: 0
OCCASIONAL FEELINGS OF UNSTEADINESS: 0
DEPRESSION: 0
FEVER: 0

## 2024-05-17 ASSESSMENT — PATIENT HEALTH QUESTIONNAIRE - PHQ9
1. LITTLE INTEREST OR PLEASURE IN DOING THINGS: NOT AT ALL
2. FEELING DOWN, DEPRESSED OR HOPELESS: NOT AT ALL
SUM OF ALL RESPONSES TO PHQ9 QUESTIONS 1 AND 2: 0

## 2024-05-17 ASSESSMENT — PAIN SCALES - GENERAL: PAINLEVEL: 6

## 2024-05-17 NOTE — PROGRESS NOTES
"Subjective   Patient ID: Angeline Gurrola is a 44 y.o. female who presents for Leg Pain.    Leg edema   Back pain  Neuropathic pain  Headaches  Tired  Vit d def    Leg Pain          Review of Systems   Constitutional:  Negative for fever.        Also see HPI   Eyes:  Negative for visual disturbance.   Respiratory:  Negative for shortness of breath.    Cardiovascular:  Negative for chest pain.   Gastrointestinal:  Negative for diarrhea and nausea.   Endocrine: Negative.    Genitourinary:  Negative for difficulty urinating.   Skin:  Negative for rash.   Neurological:  Negative for dizziness.        No focal deficits   Psychiatric/Behavioral:  Negative for suicidal ideas.    All other systems reviewed and are negative.      Objective   /70   Pulse 56   Temp 36.6 °C (97.9 °F)   Ht 1.727 m (5' 8\")   Wt (!) 204 kg (449 lb 6.4 oz)   LMP 05/03/2024 (Approximate)   SpO2 96%   BMI 68.33 kg/m²     Physical Exam  Vitals and nursing note reviewed.   Constitutional:       Appearance: Normal appearance.   HENT:      Head: Normocephalic and atraumatic.   Eyes:      Extraocular Movements: Extraocular movements intact.      Conjunctiva/sclera: Conjunctivae normal.   Cardiovascular:      Rate and Rhythm: Normal rate and regular rhythm.      Heart sounds: Normal heart sounds.   Pulmonary:      Effort: Pulmonary effort is normal.      Breath sounds: Normal breath sounds.      Comments: Lungs essentially CTA b/l  Abdominal:      General: There is no distension.      Palpations: Abdomen is soft. There is no mass.      Tenderness: There is no abdominal tenderness.   Musculoskeletal:      Lumbar back: Tenderness and bony tenderness present.      Right lower leg: Edema present.      Left lower leg: Edema present.   Skin:     Coloration: Skin is not jaundiced.      Findings: No rash.   Neurological:      General: No focal deficit present.      Mental Status: She is alert and oriented to person, place, and time.   Psychiatric:       "   Mood and Affect: Mood normal.         Behavior: Behavior normal.         Thought Content: Thought content normal.         Judgment: Judgment normal.         Assessment/Plan   Diagnoses and all orders for this visit:  Anxiety  -     ALPRAZolam (Xanax) 1 mg tablet; Take 1 tablet (1 mg) by mouth once daily as needed for anxiety.  Lymphedema  -     Comprehensive Metabolic Panel; Future  -     TSH with reflex to Free T4 if abnormal; Future  -     CBC and Auto Differential; Future  -     Vitamin B12; Future  -     Vitamin B6; Future  Hyperglycemia  -     Hemoglobin A1C; Future  Tired  -     Comprehensive Metabolic Panel; Future  -     TSH with reflex to Free T4 if abnormal; Future  -     CBC and Auto Differential; Future  -     Vitamin B12; Future  -     Vitamin B6; Future  Other headache syndrome  -     Comprehensive Metabolic Panel; Future  -     TSH with reflex to Free T4 if abnormal; Future  -     CBC and Auto Differential; Future  -     Vitamin B12; Future  -     Vitamin B6; Future  Neuropathic pain  -     Comprehensive Metabolic Panel; Future  -     TSH with reflex to Free T4 if abnormal; Future  -     CBC and Auto Differential; Future  -     Vitamin B12; Future  -     Vitamin B6; Future  -     XR lumbar spine complete 4+ views; Future  Vitamin D deficiency  -     ergocalciferol (Vitamin D2) 1.25 MG (61447 UT) capsule; Take 1 capsule (50,000 Units) by mouth 2 times a week.  -     Vitamin D 25-Hydroxy,Total (for eval of Vitamin D levels); Future  Dorsalgia  -     XR lumbar spine complete 4+ views; Future

## 2024-05-17 NOTE — PATIENT INSTRUCTIONS
PAIN MANAGEMENT IN ADULTS    What is pain? Pain is your body’s way to reacting to injury or illness. Everybody reacts to pain in different ways. What you think is painful may not be painful to someone else. But, pain is whatever you say it is!  What causes pain? Many things, such as an injury, surgery, or a disease can cause pain. Some pain is caused by pressure one a nerve, such as a cancerous tumor or slipped disc. Other pain is caused when nerves are cut as in an accident or surgery. After an injury or surgery you may not want to move the painful part of our body at all. But, you may have pain because you are not moving this body part. Sometimes there is no clear reason for your pain.    What are the different types of pain?  Acute pain is short?lived and lasts less than 3 months. Caregivers help first work to remove the cause of the pain, such as fixing a broken arm. Acute pain usually can be controlled or stopped with pain medicine.  Chronic pain lasts longer than 3?6 months. This kind of pain is often more complicated. Caregivers may use medicine along with other treatments, like self?hypnosis and relaxation to help you learn to deal with the chronic pain.  What is your pain like? Caregivers want you to talk to them about your pain. This helps them learn what may be causing the pain and how best to treat it.  Why is pain control important? Pain can affect your appetite, how well you sleep, your energy and your ability to do things. Pain can also affect your mood and relationship with others. If caregivers can help you control your pain, you will suffer less and can even heal faster.  How can pain medicine be given? Following are the many different ways pain medicine can be given depending on the kind of pain you are experiencing.  By mouth: you may be given pills or liquid to swallow or you may be given a pill or liquid to put under your tongue. Some medicine can also be given as a lozenge like a cough drop or  even a special lollipop.  Rectal: medicine in a suppository is put into your rectum.  Shot/Injection: pain medicine can be given as a shot/injection into an IV, into a muscle or under the skin  Topical: medicine in a cream or gel is spread over the skin.  Transdermal: medicine given in a patch and put onto the skin. This medicine is released slowly to give pain relief for as long as 72 hours.    How can you take pain medicine safely and make it work best for you? The following are many different ways pain medicine can be given depending on the kind of pain you have.  Some pain medicines can make you breathe less deeply and less often. The medicine may also make you sleepy, dizzy, and unsafe to drive a car or use heavy equipment. For these reasons, it is very important to follow your caregiver’s advice on how to use this medicine.  Sometimes the pain is worse when you first wake up in the morning. This may happened if you did not have enough pain medicine in your blood stream to last through the night. Caregivers may tell you to take a dose of pain medicine during the night.  Some foods, alcohol, and other medicines may cause unpleasant side effects when you take pain medicine. Follow your caregiver’s advice about how to prevent these problems.  Pain medicine can make you constipated. Straining with a BM can make you pain worse. Do not try to push the BM out if it’s too hard. Contact your caregiver if you become constipated.  Other non?drug pain control methods. There are many pain control techniques that can held you deal with pain even if it does not go away completely. It is important to practice some of the techniques when you don’t have pain if possible. This will help the technique work better during an attack of pain.  Cold and heat: both cold and heat can help lessen some types of post?op pain. Caregivers will tell you if cold and/or hot packs will help your pain.  Distraction: teaches you to focus your  "attention on something other than pain. Playing cards or games, talking and visiting family may relax you and keep you from thinking about pain.  Hydrotherapy: is a gentle water exercise program. It can strengthen muscles that are not injured and lessen inflammation. Talk to your physical therapist or your caregiver about starting a hydrotherapy program.  Massage  Music  Physical therapy  Rest  Surgery/Nerve blocks  Call your caregiver if you have any of the following problems:  The pain medicine you are taking makes you sleepier than usual or confused  You have new pain or the pain seems different than before  You have constipation  Care agreement: You have the right to help plan your care. To help with this plan you must learn about your pain, what is causing it, and how it can be treated. You can then discuss treatment options with your caregivers. Work with them to decide what care will be used to treat you. You always have the right to refuse treatment. Outpatient Falls Teaching    Take the Right Steps to Prevent Falls:    Falls and accidents seldom \"just happen\". Paying attention to your overall health and your surroundings can lower your chances of falling. A simple fall can change your life. A broken bone may not sound terrible, but as we get older a break can be the start of more serious problems.   Getting older can bring lots of changes to Sight, hearing, muscle strength, coordination and reflexes aren't what they once were. Balance can be affected by diabetes and heart disease, or by problems with your circulation, thyroid, or nervous system. Some medicines can cause dizziness. Any of these things can make a fall more likely.   Then there's osteoporosis-a disease that makes bones thin and likely to break easily. Osteoporosis is a major reason for broken bones in women past menopause. It also affects older men. When your bones are fragile even a minor fall can cause one or more bones to break. Although " people with osteoporosis must be careful to avoid falls, all of us need to take extra care as we get older.     Here are a few tips to to consider to keep you safer:    Be sure there is good lighting-leave a night light on in your bedroom, bathroom or hallways.  Keep a telephone near your bed. If you have a cell phone be sure it is in your pocket when you take the stairs or go to check the mail.  Keep all floors, walkways and hallways clear of clutter.   Clean up spills immediately.   Secure carpets to the floor or remove them, if possible.   Stay away from slippery surfaces for example, icy surfaces and freshly washed or waxed floors.   Place non-skid mats or carpets on all surfaces that may get wet/slippery.   Install hand rails on both sides of steps, when possible.   Mount grab bars near toilets and on both the inside/outside of your tub or shower.   Electric and telephone cords should run along the wall and away from waking paths.  Consider using a cane/walker to help you feel steadier when walking.  Wear rubber or non-slip soled, low heel shoes that full support your feet.   Always stand up slowly after eating,lying down, or resting to prevent you from feeling light headed.  Remember some medications may affect your coordination/balance. It is important to be aware of medication side effects   Exercise regularly when you can. Remember exercising helps keep you strong,improve muscle tone and keeps your joints,tendons and ligaments flexible.   Ask your doctor about a special test--called a bone mineral density test--that tells you how strong your bones are. If need be, your doctor can prescribe new medication that will help make your bones stronger and harder to break.   Limit the amount of alcohol you drink. Even a small amount can affect your balance and reflexes.   Have your vision and hearing tested often. Even small changes in sight and hearing can make you less stable.   Find out about buying a home  "monitoring system service. Usually, you wear a button on a chain around your neck. If you fall or need emergency help, you just push the button to alert the service. Emergency staff is then sent to your home. You can find local \"medical alarm\" services in your yellow pages.       For more information:   Many states and local areas have education and/or home modification programs to help older people prevent falls. Check with your local government's health department or division of elder affairs to see if there is a program in your area.   For more complete information on simple, inexpensive repairs and changes that would make your home safer, contact the [place] Consumer Product Safety Commission at the address below. Ask for a free copy of the booklet, Home Safety Checklist for Older Consumers.     Consumer Product Safety [place] [address]   1-781.640.8287 (toll-free)   1-425.102.7074 (TTY toll-free)   www.cpsc.gov     National Center for Injury Prevention and Control   Centers for Disease Control and Prevention   [address] [place] [address] 1-950.824.8305 (toll-free)   www.cdc.gov/ncipc    "

## 2024-05-18 LAB
25(OH)D3 SERPL-MCNC: 41 NG/ML (ref 30–100)
EST. AVERAGE GLUCOSE BLD GHB EST-MCNC: 111 MG/DL
HBA1C MFR BLD: 5.5 %
VIT B12 SERPL-MCNC: 892 PG/ML (ref 211–911)

## 2024-05-21 LAB — PYRIDOXAL PHOS SERPL-SCNC: 8.7 NMOL/L (ref 20–125)

## 2024-05-28 ENCOUNTER — APPOINTMENT (OUTPATIENT)
Dept: INFUSION THERAPY | Facility: CLINIC | Age: 44
End: 2024-05-28
Payer: COMMERCIAL

## 2024-06-02 DIAGNOSIS — M79.2 NEUROPATHIC PAIN: ICD-10-CM

## 2024-06-03 ENCOUNTER — HOSPITAL ENCOUNTER (OUTPATIENT)
Dept: RADIOLOGY | Facility: CLINIC | Age: 44
Discharge: HOME | End: 2024-06-03
Payer: COMMERCIAL

## 2024-06-03 ENCOUNTER — INFUSION (OUTPATIENT)
Dept: INFUSION THERAPY | Facility: CLINIC | Age: 44
End: 2024-06-03
Payer: COMMERCIAL

## 2024-06-03 VITALS
DIASTOLIC BLOOD PRESSURE: 84 MMHG | SYSTOLIC BLOOD PRESSURE: 157 MMHG | OXYGEN SATURATION: 99 % | RESPIRATION RATE: 18 BRPM | HEART RATE: 58 BPM | TEMPERATURE: 98.4 F

## 2024-06-03 DIAGNOSIS — M79.2 NEUROPATHIC PAIN: ICD-10-CM

## 2024-06-03 DIAGNOSIS — M45.9 ANKYLOSING SPONDYLITIS, UNSPECIFIED SITE OF SPINE (MULTI): ICD-10-CM

## 2024-06-03 DIAGNOSIS — M54.9 DORSALGIA: ICD-10-CM

## 2024-06-03 PROCEDURE — 72110 X-RAY EXAM L-2 SPINE 4/>VWS: CPT

## 2024-06-03 PROCEDURE — 72110 X-RAY EXAM L-2 SPINE 4/>VWS: CPT | Performed by: RADIOLOGY

## 2024-06-03 PROCEDURE — 96413 CHEMO IV INFUSION 1 HR: CPT | Performed by: NURSE PRACTITIONER

## 2024-06-03 RX ORDER — NORTRIPTYLINE HYDROCHLORIDE 25 MG/1
CAPSULE ORAL
Qty: 150 CAPSULE | Refills: 5 | Status: SHIPPED | OUTPATIENT
Start: 2024-06-03

## 2024-06-03 RX ORDER — DIPHENHYDRAMINE HYDROCHLORIDE 50 MG/ML
50 INJECTION INTRAMUSCULAR; INTRAVENOUS AS NEEDED
OUTPATIENT
Start: 2024-06-04

## 2024-06-03 RX ORDER — FAMOTIDINE 10 MG/ML
20 INJECTION INTRAVENOUS ONCE AS NEEDED
OUTPATIENT
Start: 2024-06-04

## 2024-06-03 RX ORDER — ALBUTEROL SULFATE 0.83 MG/ML
3 SOLUTION RESPIRATORY (INHALATION) AS NEEDED
OUTPATIENT
Start: 2024-06-04

## 2024-06-03 RX ORDER — EPINEPHRINE 0.3 MG/.3ML
0.3 INJECTION SUBCUTANEOUS EVERY 5 MIN PRN
OUTPATIENT
Start: 2024-06-04

## 2024-06-03 NOTE — PROGRESS NOTES
[facility]   Infusion Clinic Note   Date: Agatha 3, 2024   Name: Angeline Gurrola  : 1980   MRN: [id number]         Reason for Visit: Infusion       Today: We administered secukinumab (Cosentyx) 300 mg in sodium chloride 0.9% 100 mL IVPB.       Visit Type: INFUSION       Ordered By: Gilmers      Accompanied by:Self      Diagnosis: Ankylosing spondylitis, unspecified site of spine (Multi)       Allergies:   Allergies as of 2024 - Reviewed 2024   Allergen Reaction Noted    Infliximab Anaphylaxis 07/15/2021    Zolmitriptan Palpitations and Other 2013    Penicillins Hives, Swelling, and Rash 2013    Sulfa (sulfonamide antibiotics) Hives and Swelling 2013    Cephalexin GI Upset, Nausea/vomiting, and Unknown 2013    Doxycycline GI Upset 2022         Current Medications has a current medication list which includes the following prescription(s): albuterol, albuterol, alprazolam, ascorbic acid, azelastine, chlordiazepoxide-clidinium, diclofenac-misoprostol, diphenoxylate-atropine, ergocalciferol, famotidine, ferrous sulfate (325 mg ferrous sulfate), fluticasone, hyoscyamine, losartan-hydrochlorothiazide, metoclopramide, metolazone, montelukast, nebivolol, nortriptyline, ondansetron, secukinumab, sumatriptan, venlafaxine xr, and venlafaxine xr, and the following Facility-Administered Medications: cyanocobalamin and secukinumab (Cosentyx) 300 mg in sodium chloride 0.9% 100 mL IVPB.       Vitals:   There were no vitals filed for this visit.          Infusion Pre-procedure Checklist:   - Allergies reviewed: yes   - Medications reviewed: yes       - Previous reaction to current treatment: no      Assess patient for the concerns below. Document provider notification as appropriate.  - Active or recent infection with/without current antibiotic use: no  - Recent or planned invasive dental work: no  - Recent or planned surgeries: no  - Recently received or plans to  "receive vaccinations: no  - Has treatment related toxicities: no  - Is pregnant:  no      Pain: 3   - Is the pain different from normal: no   - Is the pain tolerable: yes   - Is your Doctor aware:  yes      Labs: N/A         Fall Risk Screening:         Review Of Systems:  Review of Systems   All other systems reviewed and are negative.        Infusion Readiness:   - Assessment Concerns Related to Infusion: No  - Provider notified: no      Document Below Only If Indicated:   New Patient Education:    N/A (returning patient for continuation of therapy. Ongoing education provided as needed.)        Treatment Conditions & Drug Specific Questions:    Secukinumab (COSENTYX)    (Unless otherwise specified on patient specific therapy plan):     TREATMENT CONDITIONS:  Unless otherwise specified on patient specific therapy plan HOLD and notify Provider prior to proceeding with today's infusion if patient with:  o Positive T-Spot  o Reactive Hep B and Hep C virus antibody  o Positive pregnancy prior to first treatment in women of childbearing ability    Lab Results   Component Value Date    TBSIN Negative 03/06/2024    QFG NEGATIVE 05/23/2018      Lab Results   Component Value Date    HEPBSAG Nonreactive 03/28/2024      No results found for: \"NONUHFIRE\", \"NONUHSWGH\", \"NONUHFISH\", \"EXTHEPBSAG\"  No results found for: \"HBCTI\", \"HEPBCAB\"  Lab Results   Component Value Date    HEPCAB Nonreactive 03/28/2024      Lab Results   Component Value Date    HEPCAB Nonreactive 03/28/2024       If available for review: CBC-D; CMP; CRP; LFT  Lab Results   Component Value Date    WBC 6.6 05/17/2024    HGB 12.5 05/17/2024    HCT 40.9 05/17/2024    MCV 85 05/17/2024     05/17/2024        Chemistry    Lab Results   Component Value Date/Time     05/17/2024 1029    K 4.3 05/17/2024 1029     05/17/2024 1029    CO2 29 05/17/2024 1029    BUN 10 05/17/2024 1029    CREATININE 0.67 05/17/2024 1029    Lab Results   Component Value " Date/Time    CALCIUM 9.7 05/17/2024 1029    ALKPHOS 135 (H) 05/17/2024 1029    AST 18 05/17/2024 1029    ALT 17 05/17/2024 1029    BILITOT 0.6 05/17/2024 1029           Lab Results   Component Value Date    CRP 1.81 (H) 03/06/2024      Lab Results   Component Value Date    ALT 17 05/17/2024    AST 18 05/17/2024    ALKPHOS 135 (H) 05/17/2024    BILITOT 0.6 05/17/2024        Labs reviewed and patient meets treatment conditions? Yes    DRUG SPECIFIC QUESTIONS:  Are you up to date on all of your immunizations? Yes    Do you have a history of irritable bowel disease? No          (Treatment may cause exacerbations and/or new onset of inflammatory bowel of inflammatory bowel disease)    Any new or recent diagnosis of cancer, specifically skin cancer?No          (If yes notify provider prior to proceeding)    Are you allergic to Latex? No         (Some dosage forms may contain dry natural rubber (latex)).    REMINDER:   WEIGHT BASED DRUG   PREGNANCY TEST PRIOR TO FIRST INFUSION FOR WOMEN OF CHILDBEARING ABILITY.    Recommended Vitals/Observation:  Vitals: Obtain vital signs at start and end of infusion, and as needed.  Observation: No observation.         Weight Based Drug Calculations:    WEIGHT BASED DRUGS: NOT APPLICABLE / FLAT DOSE          Initiated By: Miya Miller RN   1st maintenance dose, tolerated well.  Pt states she hasn't noticed any significant relief yet.

## 2024-06-03 NOTE — PATIENT INSTRUCTIONS
Today :Angeline Gurrola had no medications administered during this visit.     For:   1. Ankylosing spondylitis, unspecified site of spine (Multi)         Your next appointment is due in:  7/1/24        Please read the  Medication Guide that was given to you and reviewed during todays visit.     (Tell all doctors including dentists that you are taking this medication)     Go to the emergency room or call 911 if:  -You have signs of allergic reaction:   -Rash, hives, itching.   -Swollen, blistered, peeling skin.   -Swelling of face, lips, mouth, tongue or throat.   -Tightness of chest, trouble breathing, swallowing or talking     Call your doctor:  - If IV / injection site gets red, warm, swollen, itchy or leaks fluid or pus.     (Leave dressing on your IV site for at least 2 hours and keep area clean and dry  - If you get sick or have symptoms of infection or are not feeling well for any reason.    (Wash your hands often, stay away from people who are sick)  - If you have side effects from your medication that do not go away or are bothersome.     (Refer to the teaching your nurse gave you for side effects to call your doctor about)    - Common side effects may include:  stuffy nose, headache, feeling tired, muscle aches, upset stomach  - Before receiving any vaccines     - Call [facility] at   If:  - You get sick, are on antibiotics, have had a recent vaccine, have surgery or dental work and your doctor wants your visit rescheduled.  - You need to cancel and reschedule your visit for any reason. Call at least 2 days before your visit if you need to cancel.   - Your insurance changes before your next visit.    (We will need to get approval from your new insurance. This can take up to two weeks.)     [facility] is opened Monday thru Friday. We are closed on weekends and holidays.   Voice mail will take your call if the center is closed. If you leave a message please allow 24  hours for a call back during weekdays. If you leave a message on a weekend/holiday, we will call you back the next business day.

## 2024-06-11 DIAGNOSIS — E53.1 VITAMIN B6 DEFICIENCY: Primary | ICD-10-CM

## 2024-06-11 RX ORDER — LANOLIN ALCOHOL/MO/W.PET/CERES
50 CREAM (GRAM) TOPICAL DAILY
Qty: 30 TABLET | Refills: 5 | Status: SHIPPED | OUTPATIENT
Start: 2024-06-11

## 2024-06-17 ENCOUNTER — APPOINTMENT (OUTPATIENT)
Dept: NEUROLOGY | Facility: CLINIC | Age: 44
End: 2024-06-17
Payer: COMMERCIAL

## 2024-06-24 ENCOUNTER — APPOINTMENT (OUTPATIENT)
Dept: NEUROLOGY | Facility: CLINIC | Age: 44
End: 2024-06-24
Payer: COMMERCIAL

## 2024-06-24 DIAGNOSIS — R29.898 LEFT LEG WEAKNESS: Primary | ICD-10-CM

## 2024-06-24 DIAGNOSIS — E53.8 VITAMIN B12 DEFICIENCY: ICD-10-CM

## 2024-06-24 DIAGNOSIS — G43.001 MIGRAINE WITHOUT AURA AND WITH STATUS MIGRAINOSUS, NOT INTRACTABLE: ICD-10-CM

## 2024-06-24 PROCEDURE — 99215 OFFICE O/P EST HI 40 MIN: CPT | Performed by: PSYCHIATRY & NEUROLOGY

## 2024-06-24 PROCEDURE — 3008F BODY MASS INDEX DOCD: CPT | Performed by: PSYCHIATRY & NEUROLOGY

## 2024-06-24 NOTE — PROGRESS NOTES
Chief complaint:    Headaches and balance issues    History of Present Illness:   Angeline feels no better. She continues to get a severe headache around her eyes twice a week which can cause her to throw up. It may resolve after an hour, but can last hours. She cannot look at the television or phone because of the headache. Tylenol and ibuprofen could take the edge off, but her stomach has reacted to the ibuprofen, and sometimes she has had to go to the ER due to severe headache. These are distinct from her past migraines, which are on the left side of her head and require her to lie down with ice on her head. Sumatriptan knocks her out for a couple of hours and then when she wakes up, her body feels like she was beaten up for the rest of the day, so she stopped taking it. Zomig caused intolerable chest pressure and squeezing.     Her legs continue to feel heavy and she has had more falls. She does aquatic PT and aquatic exercise class 3 times a week. She fell 3 weeks ago while entering her home with bags of groceries. She missed a step and ended up falling. She has trouble lifting her left leg as high as her right leg. She has ankylosing spondylitis. She has cramplike pain in the back of the left leg. She has had surgery for dislocation of the right knee cap and she is starting to have similar symptoms in the left knee.     She has always had trouble sleeping, but her periods have become minor spotting and she is having drenching night sweats. She has stress incontinence with sneezing. Depression is not a major problems as much as anxiety.      Physical examination:  She is a very pleasant woman who looks well except that she has morbid obesity and massive swelling of her legs, worse on the left. Phalen's sign was positive for left carpal tunnel syndrome.    Neurologic Exam     Mental Status   Her speech was clear, fluent and appropriate.      Cranial Nerves     CN II   Visual fields full to confrontation.     CN  III, IV, VI   Extraocular motions are normal.     CN V   Facial sensation intact.     CN VII   Facial expression full, symmetric.     Motor Exam   Overall muscle tone: normal    Strength   Right deltoid: 5/5  Left deltoid: 5/5  Right biceps: 5/5  Left biceps: 5/5  Right interossei: 5/5  Left interossei: 4/5  Right iliopsoas: 4/5  Left iliopsoas: 3/5  Right anterior tibial: 5/5  Left anterior tibial: 5/5  No obvious muscle atrophy.     Sensory Exam   Impaired sharp sensation on the lateral borders of both feet. Sharp sensation was intact on the upper limbs.      Gait, Coordination, and Reflexes     Reflexes   Right brachioradialis: 2+  Left brachioradialis: 2+  Right biceps: 2+  Left biceps: 2+  Right triceps: 2+  Left triceps: 2+  Right patellar: 2+  Left patellar: 1+  Right achilles: 2+  Left achilles: 1+  Right plantar: normal  Left plantar: normal  Her gait was steady and symmetric with no evidence of spasticity or foot drop.           1. Left leg weakness  MR lumbar spine wo IV contrast      2. Migraine without aura and with status migrainosus, not intractable  rimegepant (Nurtec ODT) 75 mg tablet,disintegrating      3. Vitamin B12 deficiency               Orders Placed This Encounter   Procedures    MR lumbar spine wo IV contrast     Standing Status:   Future     Standing Expiration Date:   6/24/2025     Order Specific Question:   Reason for exam:     Answer:   left leg weakness     Order Specific Question:   Does the patient have a Cochlear Implant, Pacemaker, Defibrillator, Pacing Wire, Brain Aneurysm Clip, Implanted Nerve or Bone Graft Simulator, Implanted Breast Tissue Expander, Glucose Monitor or Neulasta Device?     Answer:   No     Order Specific Question:   Radiologist to Determine Optimal Study     Answer:   Yes     Order Specific Question:   Release result to [facility]     Answer:   Immediate [1]     Order Specific Question:   Is this exam part of a Research Study? If Yes, link this order to the  research study     Answer:   No          Impression and Plan:  Angeline continues to have severe headaches with features of migraines twice a week. They are different from her usual migraines. Vitamin B12 deficiency and onset of perimenopause with hot flashes disrupting her sleep could be possible causes of worsened and different migraines. She was unable to take sumatriptan and zomig due to major side effects. She will see how she does with Nurtec as needed. She will call if she has major side effects with Nurtec.     She continues to have trouble walking with balance problems and left leg weakness. She has been taking vitamin B12 supplements for 3 months, and she may notice more improvement in the next 6 months. She has noticed increased trouble lifting the left leg and has developed sensory loss in the lateral aspect of her feet so we will obtain an MRI of the lumbar spine without contrast. She also started vitamin B6 for deficiency.    Fatigue remains an issue. She will ask Dr. Dailey to recheck her iron levels after she did not tolerate oral iron for very long due to diarrhea. She will see if she can cut back nortriptyline by 25 mg every month since she has been on it for many years for an unknown reason. I also recommended that she try taking metoclopramide as needed only because of the risk of tardive dyskinesia.     She has had intermittent numbness and tingling of her hands in a distribution suggesting carpal tunnel syndrome. Phalen's sign was positive for carpal tunnel on the left. At this point she says it is a minor annoyance and feels no need to pursue further testing or treatment.     I will notify her of the MRI results. She will let me know how she does with Nurtec for her migraines. Follow up will be scheduled as needed although I will be retiring at the end of September.       Aubree Young MD

## 2024-06-25 ENCOUNTER — APPOINTMENT (OUTPATIENT)
Dept: INFUSION THERAPY | Facility: CLINIC | Age: 44
End: 2024-06-25
Payer: COMMERCIAL

## 2024-07-01 ENCOUNTER — APPOINTMENT (OUTPATIENT)
Dept: INFUSION THERAPY | Facility: CLINIC | Age: 44
End: 2024-07-01
Payer: COMMERCIAL

## 2024-07-01 VITALS
TEMPERATURE: 97.7 F | OXYGEN SATURATION: 97 % | HEART RATE: 70 BPM | SYSTOLIC BLOOD PRESSURE: 136 MMHG | DIASTOLIC BLOOD PRESSURE: 69 MMHG | RESPIRATION RATE: 18 BRPM

## 2024-07-01 DIAGNOSIS — M45.9 ANKYLOSING SPONDYLITIS, UNSPECIFIED SITE OF SPINE (MULTI): ICD-10-CM

## 2024-07-01 RX ORDER — FAMOTIDINE 10 MG/ML
20 INJECTION INTRAVENOUS ONCE AS NEEDED
OUTPATIENT
Start: 2024-07-29

## 2024-07-01 RX ORDER — DIPHENHYDRAMINE HYDROCHLORIDE 50 MG/ML
50 INJECTION INTRAMUSCULAR; INTRAVENOUS AS NEEDED
OUTPATIENT
Start: 2024-07-29

## 2024-07-01 RX ORDER — EPINEPHRINE 0.3 MG/.3ML
0.3 INJECTION SUBCUTANEOUS EVERY 5 MIN PRN
OUTPATIENT
Start: 2024-07-29

## 2024-07-01 RX ORDER — ALBUTEROL SULFATE 0.83 MG/ML
3 SOLUTION RESPIRATORY (INHALATION) AS NEEDED
OUTPATIENT
Start: 2024-07-29

## 2024-07-01 NOTE — PATIENT INSTRUCTIONS
Today :We administered secukinumab (Cosentyx) 300 mg in sodium chloride 0.9% 100 mL IVPB.     For:   1. Ankylosing spondylitis, unspecified site of spine (Multi)         Your next appointment is due in:  7/29/24        Please read the  Medication Guide that was given to you and reviewed during todays visit.     (Tell all doctors including dentists that you are taking this medication)     Go to the emergency room or call 911 if:  -You have signs of allergic reaction:   -Rash, hives, itching.   -Swollen, blistered, peeling skin.   -Swelling of face, lips, mouth, tongue or throat.   -Tightness of chest, trouble breathing, swallowing or talking     Call your doctor:  - If IV / injection site gets red, warm, swollen, itchy or leaks fluid or pus.     (Leave dressing on your IV site for at least 2 hours and keep area clean and dry  - If you get sick or have symptoms of infection or are not feeling well for any reason.    (Wash your hands often, stay away from people who are sick)  - If you have side effects from your medication that do not go away or are bothersome.     (Refer to the teaching your nurse gave you for side effects to call your doctor about)    - Common side effects may include:  stuffy nose, headache, feeling tired, muscle aches, upset stomach  - Before receiving any vaccines     - Call [facility] at   If:  - You get sick, are on antibiotics, have had a recent vaccine, have surgery or dental work and your doctor wants your visit rescheduled.  - You need to cancel and reschedule your visit for any reason. Call at least 2 days before your visit if you need to cancel.   - Your insurance changes before your next visit.    (We will need to get approval from your new insurance. This can take up to two weeks.)     [facility] is opened Monday thru Friday. We are closed on weekends and holidays.   Voice mail will take your call if the center is closed. If you leave a message  please allow 24 hours for a call back during weekdays. If you leave a message on a weekend/holiday, we will call you back the next business day.

## 2024-07-01 NOTE — PROGRESS NOTES
[facility]   Infusion Clinic Note   Date: 2024   Name: Angeline Gurrola  : 1980   MRN: [id number]         Reason for Visit: Infusion       Today: We administered secukinumab (Cosentyx) 300 mg in sodium chloride 0.9% 100 mL IVPB.       Visit Type: INFUSION       Ordered By: Gilmers      Accompanied by:Self      Diagnosis: Ankylosing spondylitis, unspecified site of spine (Multi)       Allergies:   Allergies as of 2024 - Reviewed 2024   Allergen Reaction Noted    Infliximab Anaphylaxis 07/15/2021    Zolmitriptan Palpitations and Other 2013    Penicillins Hives, Swelling, and Rash 2013    Sulfa (sulfonamide antibiotics) Hives and Swelling 2013    Cephalexin GI Upset, Nausea/vomiting, and Unknown 2013    Doxycycline GI Upset 2022         Current Medications has a current medication list which includes the following prescription(s): albuterol, albuterol, alprazolam, azelastine, chlordiazepoxide-clidinium, diclofenac-misoprostol, diphenoxylate-atropine, ergocalciferol, famotidine, ferrous sulfate (325 mg ferrous sulfate), fluticasone, hyoscyamine, losartan-hydrochlorothiazide, metoclopramide, nebivolol, nortriptyline, ondansetron, pyridoxine, rimegepant, secukinumab, venlafaxine xr, and venlafaxine xr, and the following Facility-Administered Medications: secukinumab (Cosentyx) 300 mg in sodium chloride 0.9% 100 mL IVPB.       Vitals:   There were no vitals filed for this visit.          Infusion Pre-procedure Checklist:   - Allergies reviewed: yes   - Medications reviewed: yes       - Previous reaction to current treatment: no      Assess patient for the concerns below. Document provider notification as appropriate.  - Active or recent infection with/without current antibiotic use: no  - Recent or planned invasive dental work: no  - Recent or planned surgeries: no  - Recently received or plans to receive vaccinations: no  - Has treatment  "related toxicities: no  - Is pregnant:  no      Pain: 4   - Is the pain different from normal: no   - Is the pain tolerable: yes   - Is your Doctor aware:  yes      Labs: N/A         Fall Risk Screening:         Review Of Systems:  Review of Systems   All other systems reviewed and are negative.        Infusion Readiness:   - Assessment Concerns Related to Infusion: No  - Provider notified: no      Document Below Only If Indicated:   New Patient Education:    N/A (returning patient for continuation of therapy. Ongoing education provided as needed.)        Treatment Conditions & Drug Specific Questions:    Secukinumab (COSENTYX)    (Unless otherwise specified on patient specific therapy plan):     TREATMENT CONDITIONS:  Unless otherwise specified on patient specific therapy plan HOLD and notify Provider prior to proceeding with today's infusion if patient with:  o Positive T-Spot  o Reactive Hep B and Hep C virus antibody  o Positive pregnancy prior to first treatment in women of childbearing ability    Lab Results   Component Value Date    TBSIN Negative 03/06/2024    QFG NEGATIVE 05/23/2018      Lab Results   Component Value Date    HEPBSAG Nonreactive 03/28/2024      No results found for: \"NONUHFIRE\", \"NONUHSWGH\", \"NONUHFISH\", \"EXTHEPBSAG\"  No results found for: \"HBCTI\", \"HEPBCAB\"  Lab Results   Component Value Date    HEPCAB Nonreactive 03/28/2024      Lab Results   Component Value Date    HEPCAB Nonreactive 03/28/2024       If available for review: CBC-D; CMP; CRP; LFT  Lab Results   Component Value Date    WBC 6.6 05/17/2024    HGB 12.5 05/17/2024    HCT 40.9 05/17/2024    MCV 85 05/17/2024     05/17/2024        Chemistry    Lab Results   Component Value Date/Time     05/17/2024 1029    K 4.3 05/17/2024 1029     05/17/2024 1029    CO2 29 05/17/2024 1029    BUN 10 05/17/2024 1029    CREATININE 0.67 05/17/2024 1029    Lab Results   Component Value Date/Time    CALCIUM 9.7 05/17/2024 1029    " ALKPHOS 135 (H) 05/17/2024 1029    AST 18 05/17/2024 1029    ALT 17 05/17/2024 1029    BILITOT 0.6 05/17/2024 1029           Lab Results   Component Value Date    CRP 1.81 (H) 03/06/2024      Lab Results   Component Value Date    ALT 17 05/17/2024    AST 18 05/17/2024    ALKPHOS 135 (H) 05/17/2024    BILITOT 0.6 05/17/2024        Labs reviewed and patient meets treatment conditions? Yes    DRUG SPECIFIC QUESTIONS:  Are you up to date on all of your immunizations? Yes    Do you have a history of irritable bowel disease? No          (Treatment may cause exacerbations and/or new onset of inflammatory bowel of inflammatory bowel disease)    Any new or recent diagnosis of cancer, specifically skin cancer?No          (If yes notify provider prior to proceeding)    Are you allergic to Latex? No         (Some dosage forms may contain dry natural rubber (latex)).    REMINDER:   WEIGHT BASED DRUG   PREGNANCY TEST PRIOR TO FIRST INFUSION FOR WOMEN OF CHILDBEARING ABILITY.    Recommended Vitals/Observation:  Vitals: Obtain vital signs at start and end of infusion, and as needed.  Observation: No observation.         Weight Based Drug Calculations:    WEIGHT BASED DRUGS: NOT APPLICABLE / FLAT DOSE          Initiated By: Miya Miller RN   Pt stating she's finally feeling some relief. Tolerated infusion well. No complaints

## 2024-07-15 ENCOUNTER — HOSPITAL ENCOUNTER (OUTPATIENT)
Dept: RADIOLOGY | Facility: CLINIC | Age: 44
Discharge: HOME | End: 2024-07-15
Payer: COMMERCIAL

## 2024-07-15 DIAGNOSIS — R29.898 LEFT LEG WEAKNESS: ICD-10-CM

## 2024-07-15 PROCEDURE — 72148 MRI LUMBAR SPINE W/O DYE: CPT

## 2024-07-15 PROCEDURE — 72148 MRI LUMBAR SPINE W/O DYE: CPT | Performed by: RADIOLOGY

## 2024-07-18 ENCOUNTER — TELEPHONE (OUTPATIENT)
Dept: NEUROLOGY | Facility: CLINIC | Age: 44
End: 2024-07-18
Payer: COMMERCIAL

## 2024-07-18 NOTE — TELEPHONE ENCOUNTER
I left a message that her lumbar MRI showed relatively mild degeneration of her discs.She has a congenitally small spinal canal but no major compression of the nerves to her legs.     I asked her to call back and let us know how she is doing with Nurtec for her migraines.

## 2024-07-29 ENCOUNTER — APPOINTMENT (OUTPATIENT)
Dept: INFUSION THERAPY | Facility: CLINIC | Age: 44
End: 2024-07-29
Payer: COMMERCIAL

## 2024-07-29 VITALS
TEMPERATURE: 98.2 F | SYSTOLIC BLOOD PRESSURE: 120 MMHG | OXYGEN SATURATION: 98 % | RESPIRATION RATE: 18 BRPM | DIASTOLIC BLOOD PRESSURE: 81 MMHG | HEART RATE: 54 BPM

## 2024-07-29 DIAGNOSIS — M45.9 ANKYLOSING SPONDYLITIS, UNSPECIFIED SITE OF SPINE (MULTI): ICD-10-CM

## 2024-07-29 RX ORDER — EPINEPHRINE 0.3 MG/.3ML
0.3 INJECTION SUBCUTANEOUS EVERY 5 MIN PRN
OUTPATIENT
Start: 2024-08-26

## 2024-07-29 RX ORDER — ALBUTEROL SULFATE 0.83 MG/ML
3 SOLUTION RESPIRATORY (INHALATION) AS NEEDED
OUTPATIENT
Start: 2024-08-26

## 2024-07-29 RX ORDER — FAMOTIDINE 10 MG/ML
20 INJECTION INTRAVENOUS ONCE AS NEEDED
OUTPATIENT
Start: 2024-08-26

## 2024-07-29 RX ORDER — DIPHENHYDRAMINE HYDROCHLORIDE 50 MG/ML
50 INJECTION INTRAMUSCULAR; INTRAVENOUS AS NEEDED
OUTPATIENT
Start: 2024-08-26

## 2024-07-29 NOTE — PATIENT INSTRUCTIONS
Today :We administered secukinumab (Cosentyx) 300 mg in sodium chloride 0.9% 100 mL IVPB.     For:   1. Ankylosing spondylitis, unspecified site of spine (Multi)         Your next appointment is due in:  8/26/24        Please read the  Medication Guide that was given to you and reviewed during todays visit.     (Tell all doctors including dentists that you are taking this medication)     Go to the emergency room or call 911 if:  -You have signs of allergic reaction:   -Rash, hives, itching.   -Swollen, blistered, peeling skin.   -Swelling of face, lips, mouth, tongue or throat.   -Tightness of chest, trouble breathing, swallowing or talking     Call your doctor:  - If IV / injection site gets red, warm, swollen, itchy or leaks fluid or pus.     (Leave dressing on your IV site for at least 2 hours and keep area clean and dry  - If you get sick or have symptoms of infection or are not feeling well for any reason.    (Wash your hands often, stay away from people who are sick)  - If you have side effects from your medication that do not go away or are bothersome.     (Refer to the teaching your nurse gave you for side effects to call your doctor about)    - Common side effects may include:  stuffy nose, headache, feeling tired, muscle aches, upset stomach  - Before receiving any vaccines     - Call [facility] at   If:  - You get sick, are on antibiotics, have had a recent vaccine, have surgery or dental work and your doctor wants your visit rescheduled.  - You need to cancel and reschedule your visit for any reason. Call at least 2 days before your visit if you need to cancel.   - Your insurance changes before your next visit.    (We will need to get approval from your new insurance. This can take up to two weeks.)     [facility] is opened Monday thru Friday. We are closed on weekends and holidays.   Voice mail will take your call if the center is closed. If you leave a message  please allow 24 hours for a call back during weekdays. If you leave a message on a weekend/holiday, we will call you back the next business day.

## 2024-07-29 NOTE — PROGRESS NOTES
[facility]   Infusion Clinic Note   Date: 2024   Name: Angeline Gurrola  : 1980   MRN: [id number]         Reason for Visit: Infusion       Today: We administered secukinumab (Cosentyx) 300 mg in sodium chloride 0.9% 100 mL IVPB.       Visit Type: INFUSION       Ordered By: Catalinaapas      Accompanied by:Self      Diagnosis: Ankylosing spondylitis, unspecified site of spine (Multi)       Allergies:   Allergies as of 2024 - Reviewed 2024   Allergen Reaction Noted    Infliximab Anaphylaxis 07/15/2021    Zolmitriptan Palpitations and Other 2013    Penicillins Hives, Swelling, and Rash 2013    Sulfa (sulfonamide antibiotics) Hives and Swelling 2013    Cephalexin GI Upset, Nausea/vomiting, and Unknown 2013    Doxycycline GI Upset 2022         Current Medications has a current medication list which includes the following prescription(s): albuterol, albuterol, alprazolam, azelastine, chlordiazepoxide-clidinium, diclofenac-misoprostol, diphenoxylate-atropine, ergocalciferol, famotidine, ferrous sulfate (325 mg ferrous sulfate), fluticasone, hyoscyamine, losartan-hydrochlorothiazide, metoclopramide, nebivolol, nortriptyline, ondansetron, pyridoxine, rimegepant, secukinumab, venlafaxine xr, and venlafaxine xr.       Vitals:   There were no vitals filed for this visit.          Infusion Pre-procedure Checklist:   - Allergies reviewed: yes   - Medications reviewed: yes       - Previous reaction to current treatment: no      Assess patient for the concerns below. Document provider notification as appropriate.  - Active or recent infection with/without current antibiotic use: no  - Recent or planned invasive dental work: no  - Recent or planned surgeries: no  - Recently received or plans to receive vaccinations: no  - Has treatment related toxicities: no  - Is pregnant:  no      Pain: 0   - Is the pain different from normal: no   - Is the pain tolerable:  "no   - Is your Doctor aware:  no      Labs: N/A         Fall Risk Screening:         Review Of Systems:  Review of Systems   All other systems reviewed and are negative.        Infusion Readiness:   - Assessment Concerns Related to Infusion: No  - Provider notified: no      Document Below Only If Indicated:   New Patient Education:    N/A (returning patient for continuation of therapy. Ongoing education provided as needed.)        Treatment Conditions & Drug Specific Questions:    Secukinumab (COSENTYX)    (Unless otherwise specified on patient specific therapy plan):     TREATMENT CONDITIONS:  Unless otherwise specified on patient specific therapy plan HOLD and notify Provider prior to proceeding with today's infusion if patient with:  o Positive T-Spot  o Reactive Hep B and Hep C virus antibody  o Positive pregnancy prior to first treatment in women of childbearing ability    Lab Results   Component Value Date    TBSIN Negative 03/06/2024    QFG NEGATIVE 05/23/2018      Lab Results   Component Value Date    HEPBSAG Nonreactive 03/28/2024      No results found for: \"NONUHFIRE\", \"NONUHSWGH\", \"NONUHFISH\", \"EXTHEPBSAG\"  No results found for: \"HBCTI\", \"HEPBCAB\"  Lab Results   Component Value Date    HEPCAB Nonreactive 03/28/2024      Lab Results   Component Value Date    HEPCAB Nonreactive 03/28/2024       If available for review: CBC-D; CMP; CRP; LFT  Lab Results   Component Value Date    WBC 6.6 05/17/2024    HGB 12.5 05/17/2024    HCT 40.9 05/17/2024    MCV 85 05/17/2024     05/17/2024        Chemistry    Lab Results   Component Value Date/Time     05/17/2024 1029    K 4.3 05/17/2024 1029     05/17/2024 1029    CO2 29 05/17/2024 1029    BUN 10 05/17/2024 1029    CREATININE 0.67 05/17/2024 1029    Lab Results   Component Value Date/Time    CALCIUM 9.7 05/17/2024 1029    ALKPHOS 135 (H) 05/17/2024 1029    AST 18 05/17/2024 1029    ALT 17 05/17/2024 1029    BILITOT 0.6 05/17/2024 1029           Lab " Results   Component Value Date    CRP 1.81 (H) 03/06/2024      Lab Results   Component Value Date    ALT 17 05/17/2024    AST 18 05/17/2024    ALKPHOS 135 (H) 05/17/2024    BILITOT 0.6 05/17/2024        Labs reviewed and patient meets treatment conditions? Yes    DRUG SPECIFIC QUESTIONS:  Are you up to date on all of your immunizations? Yes    Do you have a history of irritable bowel disease? No          (Treatment may cause exacerbations and/or new onset of inflammatory bowel of inflammatory bowel disease)    Any new or recent diagnosis of cancer, specifically skin cancer?No          (If yes notify provider prior to proceeding)    Are you allergic to Latex? No         (Some dosage forms may contain dry natural rubber (latex)).    REMINDER:   WEIGHT BASED DRUG   PREGNANCY TEST PRIOR TO FIRST INFUSION FOR WOMEN OF CHILDBEARING ABILITY.    Recommended Vitals/Observation:  Vitals: Obtain vital signs at start and end of infusion, and as needed.  Observation: No observation.         Weight Based Drug Calculations:    WEIGHT BASED DRUGS: NOT APPLICABLE / FLAT DOSE          Initiated By: Miya Miller RN   Pt tolerated well.

## 2024-08-07 ENCOUNTER — HOSPITAL ENCOUNTER (OUTPATIENT)
Dept: RADIOLOGY | Facility: CLINIC | Age: 44
Discharge: HOME | End: 2024-08-07
Payer: COMMERCIAL

## 2024-08-07 ENCOUNTER — LAB (OUTPATIENT)
Dept: LAB | Facility: LAB | Age: 44
End: 2024-08-07
Payer: COMMERCIAL

## 2024-08-07 ENCOUNTER — APPOINTMENT (OUTPATIENT)
Dept: RHEUMATOLOGY | Facility: CLINIC | Age: 44
End: 2024-08-07
Payer: COMMERCIAL

## 2024-08-07 VITALS
SYSTOLIC BLOOD PRESSURE: 127 MMHG | WEIGHT: 293 LBS | HEIGHT: 69 IN | BODY MASS INDEX: 43.4 KG/M2 | OXYGEN SATURATION: 95 % | DIASTOLIC BLOOD PRESSURE: 82 MMHG | HEART RATE: 61 BPM

## 2024-08-07 DIAGNOSIS — Z79.899 ENCOUNTER FOR LONG-TERM (CURRENT) USE OF MEDICATIONS: ICD-10-CM

## 2024-08-07 DIAGNOSIS — M25.551 RIGHT HIP PAIN: Primary | ICD-10-CM

## 2024-08-07 DIAGNOSIS — M45.9 ANKYLOSING SPONDYLITIS, UNSPECIFIED SITE OF SPINE (MULTI): ICD-10-CM

## 2024-08-07 DIAGNOSIS — M25.551 RIGHT HIP PAIN: ICD-10-CM

## 2024-08-07 LAB
ALBUMIN SERPL BCP-MCNC: 4.3 G/DL (ref 3.4–5)
ALP SERPL-CCNC: 138 U/L (ref 33–110)
ALT SERPL W P-5'-P-CCNC: 25 U/L (ref 7–45)
ANION GAP SERPL CALC-SCNC: 13 MMOL/L (ref 10–20)
AST SERPL W P-5'-P-CCNC: 21 U/L (ref 9–39)
BASOPHILS # BLD AUTO: 0.05 X10*3/UL (ref 0–0.1)
BASOPHILS NFR BLD AUTO: 0.5 %
BILIRUB SERPL-MCNC: 0.3 MG/DL (ref 0–1.2)
BUN SERPL-MCNC: 10 MG/DL (ref 6–23)
CALCIUM SERPL-MCNC: 9.4 MG/DL (ref 8.6–10.3)
CHLORIDE SERPL-SCNC: 102 MMOL/L (ref 98–107)
CO2 SERPL-SCNC: 29 MMOL/L (ref 21–32)
CREAT SERPL-MCNC: 0.65 MG/DL (ref 0.5–1.05)
EGFRCR SERPLBLD CKD-EPI 2021: >90 ML/MIN/1.73M*2
EOSINOPHIL # BLD AUTO: 0.31 X10*3/UL (ref 0–0.7)
EOSINOPHIL NFR BLD AUTO: 3.2 %
ERYTHROCYTE [DISTWIDTH] IN BLOOD BY AUTOMATED COUNT: 14.8 % (ref 11.5–14.5)
GLUCOSE SERPL-MCNC: 103 MG/DL (ref 74–99)
HCT VFR BLD AUTO: 42.1 % (ref 36–46)
HGB BLD-MCNC: 12.8 G/DL (ref 12–16)
IMM GRANULOCYTES # BLD AUTO: 0.03 X10*3/UL (ref 0–0.7)
IMM GRANULOCYTES NFR BLD AUTO: 0.3 % (ref 0–0.9)
LYMPHOCYTES # BLD AUTO: 2.64 X10*3/UL (ref 1.2–4.8)
LYMPHOCYTES NFR BLD AUTO: 27.2 %
MCH RBC QN AUTO: 26.6 PG (ref 26–34)
MCHC RBC AUTO-ENTMCNC: 30.4 G/DL (ref 32–36)
MCV RBC AUTO: 88 FL (ref 80–100)
MONOCYTES # BLD AUTO: 0.64 X10*3/UL (ref 0.1–1)
MONOCYTES NFR BLD AUTO: 6.6 %
NEUTROPHILS # BLD AUTO: 6.05 X10*3/UL (ref 1.2–7.7)
NEUTROPHILS NFR BLD AUTO: 62.2 %
NRBC BLD-RTO: 0 /100 WBCS (ref 0–0)
PLATELET # BLD AUTO: 367 X10*3/UL (ref 150–450)
POTASSIUM SERPL-SCNC: 4.6 MMOL/L (ref 3.5–5.3)
PROT SERPL-MCNC: 6.7 G/DL (ref 6.4–8.2)
RBC # BLD AUTO: 4.81 X10*6/UL (ref 4–5.2)
SODIUM SERPL-SCNC: 139 MMOL/L (ref 136–145)
WBC # BLD AUTO: 9.7 X10*3/UL (ref 4.4–11.3)

## 2024-08-07 PROCEDURE — 99214 OFFICE O/P EST MOD 30 MIN: CPT | Performed by: INTERNAL MEDICINE

## 2024-08-07 PROCEDURE — 73502 X-RAY EXAM HIP UNI 2-3 VIEWS: CPT | Mod: LT

## 2024-08-07 PROCEDURE — 85025 COMPLETE CBC W/AUTO DIFF WBC: CPT

## 2024-08-07 PROCEDURE — 36415 COLL VENOUS BLD VENIPUNCTURE: CPT

## 2024-08-07 PROCEDURE — 3008F BODY MASS INDEX DOCD: CPT | Performed by: INTERNAL MEDICINE

## 2024-08-07 PROCEDURE — 80053 COMPREHEN METABOLIC PANEL: CPT

## 2024-08-07 ASSESSMENT — ENCOUNTER SYMPTOMS
FATIGUE: 1
BACK PAIN: 1

## 2024-08-07 NOTE — PROGRESS NOTES
Subjective   Patient ID: Angeline Gurrola is a 44 y.o. female who presents for Follow-up (5 mo fuv).  HPI  Patient has history of ankylosing spondylitis with +HLA-b27.      Patient was last seen in March and at that time We had not seen her in some time.  We had started her on Cosentyx infusions.  She does feel that she has done better and that she has less back pain.  She does not get as much pain in her upper back and neck.  She does not hurt as much as she did previously.  She has a little bit of stomach trouble after the infusion and also the next day.  She does get patient assistance and does not have to pay for anything.  She has had a couple falls.  She has a hard time picking up her left leg.  She did have a MRI with her neurologist that did not show any distinct radiculopathy.  There were degenerative changes and mass effect on the thecal sac most pronounced at L2-L3 and L3 at L4 and some neuroforaminal at L4-L5.    Has difficulty lifting her leg to get into a car.    Neurology did tell her to start taking vitamin B6 and B12.    Has had more swelling in the left lower extremity  Review of Systems   Constitutional:  Positive for fatigue.   Cardiovascular:  Positive for leg swelling.   Musculoskeletal:  Positive for back pain and gait problem.       Objective   Physical Exam  GEN: NAD A&O x3 appropriate affect  HENT: no enlarged glands or thyroid  EYES: no conjunctival redness, eyelids normal  LYMPH: no cervical lymphadenopathy  CV: Lymphedema  SKIN: Some mild dryness on her palms  PULSES: +radials  TENDER POINTS: 9/18   MSK:  Edema lower extremities more in the left than the right  Pain with flexion and internal/external rotation of the left hip  No swelling in her DIP PIP MCP wrist elbows shoulders  Assessment/Plan     Positive HLA-B27 ankylosing spondylitis previously has been on methotrexate, Humira, infliximab,  Taltz   -Has been doing Cosentyx infusions monthly and has had some improvement in her pain and  stiffness symptoms.  Recently she has had more weakness in her left lower extremity and has difficulty lifting it.  Reviewed her recent lumbar MRI.  Will get left hip x-ray and send her for water therapy.   -Will have her do blood work  Will see her back in 4 to 5 months or as needed.       Mimi Raya MD 08/07/24 2:07 PM

## 2024-08-16 ENCOUNTER — LAB (OUTPATIENT)
Dept: LAB | Facility: LAB | Age: 44
End: 2024-08-16
Payer: COMMERCIAL

## 2024-08-16 ENCOUNTER — OFFICE VISIT (OUTPATIENT)
Dept: PRIMARY CARE | Facility: CLINIC | Age: 44
End: 2024-08-16
Payer: COMMERCIAL

## 2024-08-16 VITALS
OXYGEN SATURATION: 99 % | HEART RATE: 66 BPM | BODY MASS INDEX: 43.4 KG/M2 | TEMPERATURE: 97.9 F | WEIGHT: 293 LBS | SYSTOLIC BLOOD PRESSURE: 122 MMHG | HEIGHT: 69 IN | DIASTOLIC BLOOD PRESSURE: 78 MMHG

## 2024-08-16 DIAGNOSIS — F41.9 ANXIETY: ICD-10-CM

## 2024-08-16 DIAGNOSIS — I89.0 LYMPHEDEMA: Primary | ICD-10-CM

## 2024-08-16 DIAGNOSIS — Z79.899 LONG-TERM USE OF HIGH-RISK MEDICATION: ICD-10-CM

## 2024-08-16 DIAGNOSIS — M79.2 NEUROPATHIC PAIN: ICD-10-CM

## 2024-08-16 DIAGNOSIS — I89.0 LYMPHEDEMA: ICD-10-CM

## 2024-08-16 DIAGNOSIS — Z91.89 COMPLIANCE WITH MEDICATION REGIMEN: ICD-10-CM

## 2024-08-16 DIAGNOSIS — I87.2 VENOUS STASIS DERMATITIS OF BOTH LOWER EXTREMITIES: ICD-10-CM

## 2024-08-16 LAB
AMPHETAMINES UR QL SCN: NORMAL
BARBITURATES UR QL SCN: NORMAL
BZE UR QL SCN: NORMAL
CANNABINOIDS UR QL SCN: NORMAL
CREAT UR-MCNC: 168.1 MG/DL (ref 20–320)
MAGNESIUM SERPL-MCNC: 2.05 MG/DL (ref 1.6–2.4)
PCP UR QL SCN: NORMAL

## 2024-08-16 PROCEDURE — 80354 DRUG SCREENING FENTANYL: CPT

## 2024-08-16 PROCEDURE — 3008F BODY MASS INDEX DOCD: CPT | Performed by: FAMILY MEDICINE

## 2024-08-16 PROCEDURE — 1036F TOBACCO NON-USER: CPT | Performed by: FAMILY MEDICINE

## 2024-08-16 PROCEDURE — 80358 DRUG SCREENING METHADONE: CPT

## 2024-08-16 PROCEDURE — 80365 DRUG SCREENING OXYCODONE: CPT

## 2024-08-16 PROCEDURE — 80373 DRUG SCREENING TRAMADOL: CPT

## 2024-08-16 PROCEDURE — 80346 BENZODIAZEPINES1-12: CPT

## 2024-08-16 PROCEDURE — 82570 ASSAY OF URINE CREATININE: CPT

## 2024-08-16 PROCEDURE — 80307 DRUG TEST PRSMV CHEM ANLYZR: CPT

## 2024-08-16 PROCEDURE — 80368 SEDATIVE HYPNOTICS: CPT

## 2024-08-16 PROCEDURE — 99214 OFFICE O/P EST MOD 30 MIN: CPT | Performed by: FAMILY MEDICINE

## 2024-08-16 PROCEDURE — 36415 COLL VENOUS BLD VENIPUNCTURE: CPT

## 2024-08-16 PROCEDURE — 84207 ASSAY OF VITAMIN B-6: CPT

## 2024-08-16 PROCEDURE — 80361 OPIATES 1 OR MORE: CPT

## 2024-08-16 RX ORDER — FUROSEMIDE 40 MG/1
40 TABLET ORAL DAILY
Qty: 30 TABLET | Refills: 1 | Status: SHIPPED | OUTPATIENT
Start: 2024-08-16

## 2024-08-16 RX ORDER — ALPRAZOLAM 1 MG/1
1 TABLET ORAL 2 TIMES DAILY PRN
Qty: 30 TABLET | Refills: 2 | Status: SHIPPED | OUTPATIENT
Start: 2024-08-16

## 2024-08-16 ASSESSMENT — ENCOUNTER SYMPTOMS
DIZZINESS: 0
DIFFICULTY URINATING: 0
FEVER: 0
DIARRHEA: 0
LOSS OF SENSATION IN FEET: 1
DEPRESSION: 1
SHORTNESS OF BREATH: 0
NAUSEA: 0
OCCASIONAL FEELINGS OF UNSTEADINESS: 1
ENDOCRINE NEGATIVE: 1

## 2024-08-16 ASSESSMENT — PATIENT HEALTH QUESTIONNAIRE - PHQ9
SUM OF ALL RESPONSES TO PHQ9 QUESTIONS 1 AND 2: 0
1. LITTLE INTEREST OR PLEASURE IN DOING THINGS: NOT AT ALL
2. FEELING DOWN, DEPRESSED OR HOPELESS: NOT AT ALL

## 2024-08-16 ASSESSMENT — PAIN SCALES - GENERAL: PAINLEVEL: 7

## 2024-08-16 NOTE — PROGRESS NOTES
"Subjective   Patient ID: Angeline Gurrola is a 44 y.o. female who presents for Leg Swelling, Anxiety, Medication Problem (Discuss Lomotil/), and scaly-dry legs.    Neuropathic pain in feet  Edema in legs with dry skin  Leg edema worse  Increased stress, anxiety, 4 yr old cousin recently drowned    Anxiety  Patient reports no chest pain, dizziness, nausea, shortness of breath or suicidal ideas.            Review of Systems   Constitutional:  Negative for fever.        Also see HPI   Eyes:  Negative for visual disturbance.   Respiratory:  Negative for shortness of breath.    Cardiovascular:  Negative for chest pain.   Gastrointestinal:  Negative for diarrhea and nausea.   Endocrine: Negative.    Genitourinary:  Negative for difficulty urinating.   Skin:  Negative for rash.   Neurological:  Negative for dizziness.        No focal deficits   Psychiatric/Behavioral:  Negative for suicidal ideas.    All other systems reviewed and are negative.      Objective   /78   Pulse 66   Temp 36.6 °C (97.9 °F)   Ht 1.753 m (5' 9\")   Wt (!) 200 kg (441 lb)   LMP 07/25/2024 (Approximate)   SpO2 99%   BMI 65.12 kg/m²     Physical Exam  Vitals and nursing note reviewed.   Constitutional:       Appearance: Normal appearance.   HENT:      Head: Normocephalic and atraumatic.      Right Ear: Tympanic membrane, ear canal and external ear normal.      Left Ear: Tympanic membrane, ear canal and external ear normal.      Nose: Nose normal.      Mouth/Throat:      Mouth: Mucous membranes are moist.      Pharynx: Oropharynx is clear.   Eyes:      Extraocular Movements: Extraocular movements intact.      Conjunctiva/sclera: Conjunctivae normal.      Pupils: Pupils are equal, round, and reactive to light.   Cardiovascular:      Rate and Rhythm: Normal rate and regular rhythm.      Heart sounds: Normal heart sounds.   Pulmonary:      Effort: No respiratory distress.      Breath sounds: Normal breath sounds.   Abdominal:      General: " Bowel sounds are normal. There is no distension.      Palpations: Abdomen is soft. There is no mass.      Tenderness: There is no abdominal tenderness.   Musculoskeletal:      Cervical back: Normal range of motion and neck supple.      Right lower leg: Edema present.      Left lower leg: Edema present.   Skin:     General: Skin is dry.      Coloration: Skin is not jaundiced.      Findings: No rash.   Neurological:      General: No focal deficit present.      Mental Status: She is alert and oriented to person, place, and time.   Psychiatric:         Mood and Affect: Mood normal.         Speech: Speech normal.         Behavior: Behavior normal.         Thought Content: Thought content normal.         Judgment: Judgment normal.         Assessment/Plan   Diagnoses and all orders for this visit:  Lymphedema  -     furosemide (Lasix) 40 mg tablet; Take 1 tablet (40 mg) by mouth once daily.  -     Magnesium; Future  -     Vitamin B6; Future  Anxiety  -     ALPRAZolam (Xanax) 1 mg tablet; Take 1 tablet (1 mg) by mouth 2 times a day as needed for anxiety.  -     Opiate/Opioid/Benzo Prescription Compliance; Future  Venous stasis dermatitis of both lower extremities  -     furosemide (Lasix) 40 mg tablet; Take 1 tablet (40 mg) by mouth once daily.  Compliance with medication regimen  -     Opiate/Opioid/Benzo Prescription Compliance; Future  Long-term use of high-risk medication  -     Opiate/Opioid/Benzo Prescription Compliance; Future  Neuropathic pain  -     Magnesium; Future  -     Vitamin B6; Future

## 2024-08-21 LAB
1OH-MIDAZOLAM UR CFM-MCNC: <25 NG/ML
6MAM UR CFM-MCNC: <25 NG/ML
7AMINOCLONAZEPAM UR CFM-MCNC: <25 NG/ML
A-OH ALPRAZ UR CFM-MCNC: 249 NG/ML
ALPRAZ UR CFM-MCNC: 203 NG/ML
CHLORDIAZEP UR CFM-MCNC: <25 NG/ML
CLONAZEPAM UR CFM-MCNC: <25 NG/ML
CODEINE UR CFM-MCNC: <50 NG/ML
DIAZEPAM UR CFM-MCNC: <25 NG/ML
EDDP UR CFM-MCNC: <25 NG/ML
FENTANYL UR CFM-MCNC: <2.5 NG/ML
HYDROCODONE CTO UR CFM-MCNC: <25 NG/ML
HYDROMORPHONE UR CFM-MCNC: <25 NG/ML
LORAZEPAM UR CFM-MCNC: <25 NG/ML
METHADONE UR CFM-MCNC: <25 NG/ML
MIDAZOLAM UR CFM-MCNC: <25 NG/ML
MORPHINE UR CFM-MCNC: <50 NG/ML
NORDIAZEPAM UR CFM-MCNC: <25 NG/ML
NORFENTANYL UR CFM-MCNC: <2.5 NG/ML
NORHYDROCODONE UR CFM-MCNC: <25 NG/ML
NOROXYCODONE UR CFM-MCNC: <25 NG/ML
NORTRAMADOL UR-MCNC: <50 NG/ML
OXAZEPAM UR CFM-MCNC: <25 NG/ML
OXYCODONE UR CFM-MCNC: <25 NG/ML
OXYMORPHONE UR CFM-MCNC: <25 NG/ML
PYRIDOXAL PHOS SERPL-SCNC: 140.5 NMOL/L (ref 20–125)
TEMAZEPAM UR CFM-MCNC: <25 NG/ML
TRAMADOL UR CFM-MCNC: <50 NG/ML
ZOLPIDEM UR CFM-MCNC: <25 NG/ML
ZOLPIDEM UR-MCNC: <25 NG/ML

## 2024-08-26 ENCOUNTER — APPOINTMENT (OUTPATIENT)
Dept: INFUSION THERAPY | Facility: CLINIC | Age: 44
End: 2024-08-26
Payer: COMMERCIAL

## 2024-08-26 VITALS
BODY MASS INDEX: 64.74 KG/M2 | RESPIRATION RATE: 16 BRPM | SYSTOLIC BLOOD PRESSURE: 121 MMHG | HEART RATE: 71 BPM | DIASTOLIC BLOOD PRESSURE: 56 MMHG | WEIGHT: 293 LBS | TEMPERATURE: 99.7 F | OXYGEN SATURATION: 98 %

## 2024-08-26 DIAGNOSIS — M45.9 ANKYLOSING SPONDYLITIS, UNSPECIFIED SITE OF SPINE (MULTI): ICD-10-CM

## 2024-08-26 PROCEDURE — 96413 CHEMO IV INFUSION 1 HR: CPT | Performed by: NURSE PRACTITIONER

## 2024-08-26 RX ORDER — ALBUTEROL SULFATE 0.83 MG/ML
3 SOLUTION RESPIRATORY (INHALATION) AS NEEDED
OUTPATIENT
Start: 2024-09-23

## 2024-08-26 RX ORDER — FAMOTIDINE 10 MG/ML
20 INJECTION INTRAVENOUS ONCE AS NEEDED
OUTPATIENT
Start: 2024-09-23

## 2024-08-26 RX ORDER — DIPHENHYDRAMINE HYDROCHLORIDE 50 MG/ML
50 INJECTION INTRAMUSCULAR; INTRAVENOUS AS NEEDED
OUTPATIENT
Start: 2024-09-23

## 2024-08-26 RX ORDER — LANOLIN ALCOHOL/MO/W.PET/CERES
1000 CREAM (GRAM) TOPICAL DAILY
COMMUNITY

## 2024-08-26 RX ORDER — EPINEPHRINE 0.3 MG/.3ML
0.3 INJECTION SUBCUTANEOUS EVERY 5 MIN PRN
OUTPATIENT
Start: 2024-09-23

## 2024-08-26 ASSESSMENT — ENCOUNTER SYMPTOMS
NERVOUS/ANXIOUS: 1
HEADACHES: 1
LEG SWELLING: 1
BACK PAIN: 1
EXTREMITY WEAKNESS: 1
ROS GI COMMENTS: IBS

## 2024-08-26 NOTE — PROGRESS NOTES
[facility]   Infusion Clinic Note   Date: 2024   Name: Angeline Gurrola  : 1980   MRN: [id number]          Reason for Visit: OP Infusion (Cosentyx 300 mg infusion every 28 days)         Today: We administered secukinumab (Cosentyx) 300 mg in sodium chloride 0.9% 100 mL IVPB.       Visit Type: INFUSION       Ordered By: Dr. Raya       Accompanied by:Self       Diagnosis: Ankylosing spondylitis, unspecified site of spine (Multi)        Allergies:   Allergies as of 2024 - Reviewed 2024   Allergen Reaction Noted    Infliximab Anaphylaxis 07/15/2021    Zolmitriptan Palpitations and Other 2013    Penicillins Hives, Swelling, and Rash 2013    Sulfa (sulfonamide antibiotics) Hives and Swelling 2013    Cephalexin GI Upset, Nausea/vomiting, and Unknown 2013    Doxycycline GI Upset 2022          Current Medications has a current medication list which includes the following prescription(s): albuterol, alprazolam, azelastine, chlordiazepoxide-clidinium, cyanocobalamin, diclofenac-misoprostol, diphenoxylate-atropine, ergocalciferol, famotidine, ferrous sulfate (325 mg ferrous sulfate), fluticasone, furosemide, hyoscyamine, losartan-hydrochlorothiazide, metoclopramide, nebivolol, nortriptyline, ondansetron, pyridoxine, rimegepant, secukinumab, venlafaxine xr, and venlafaxine xr.       Vitals:   Vitals:    24 1252 24 1413   BP: 141/61 121/56   Pulse: 65 71   Resp: 16 16   Temp: 36.4 °C (97.5 °F) 37.6 °C (99.7 °F)   TempSrc: Temporal    SpO2: 98% 98%   Weight: (!) 199 kg (438 lb 6.4 oz)    LMP: 2024             Infusion Pre-procedure Checklist:   - Allergies reviewed: yes   - Medications reviewed: yes       - Previous reaction to current treatment: no      Assess patient for the concerns below. Document provider notification as appropriate.  - Active or recent infection with/without current antibiotic use: no  - Recent or planned  "invasive dental work: no  - Recent or planned surgeries: no  - Recently received or plans to receive vaccinations: no  - Has treatment related toxicities: no  - Is pregnant:  no      Pain: 7   - Is the pain different from normal: yes   - Is the pain tolerable: yes   - Is your Doctor aware:  yes       Labs: N/A          Fall Risk Screening: Kraus Fall Risk  History of Falling, Immediate or Within 3 Months: Yes (left leg gave out)  Secondary Diagnosis: Yes  Ambulatory Aid: Walks without aid/bedrest/nurse assist  Intravenous Therapy/Heparin Lock: No  Gait/Transferring: Impaired   Mental Status: Oriented to own ability  Kraus Fall Risk Score: 60       Review Of Systems:  Review of Systems   Cardiovascular:  Positive for leg swelling.   Gastrointestinal:         IBS   Musculoskeletal:  Positive for back pain.   Neurological:  Positive for extremity weakness and headaches.   Psychiatric/Behavioral:  The patient is nervous/anxious.    All other systems reviewed and are negative.        ROS completed? Yes      Infusion Readiness:  - Assessment Concerns Related to Infusion: No  - Provider notified: n/a      Document Below Only If Indicated:   New Patient Education:    N/A (returning patient for continuation of therapy. Ongoing education provided as needed.)        Treatment Conditions & Drug Specific Questions:    Secukinumab (COSENTYX)    (Unless otherwise specified on patient specific therapy plan):     TREATMENT CONDITIONS:  Unless otherwise specified on patient specific therapy plan HOLD and notify Provider prior to proceeding with today's infusion if patient with:  o Positive T-Spot  o Reactive Hep B and Hep C virus antibody  o Positive pregnancy prior to first treatment in women of childbearing ability    Lab Results   Component Value Date    TBSIN Negative 03/06/2024    QFG NEGATIVE 05/23/2018      Lab Results   Component Value Date    HEPBSAG Nonreactive 03/28/2024      No results found for: \"NONUHFIRE\", \"NONUHSWGH\", " "\"NONUHFISH\", \"EXTHEPBSAG\"  No results found for: \"HBCTI\", \"HEPBCAB\"  Lab Results   Component Value Date    HEPCAB Nonreactive 03/28/2024      Lab Results   Component Value Date    HEPCAB Nonreactive 03/28/2024       If available for review: CBC-D; CMP; CRP; LFT  Lab Results   Component Value Date    WBC 9.7 08/07/2024    HGB 12.8 08/07/2024    HCT 42.1 08/07/2024    MCV 88 08/07/2024     08/07/2024        Chemistry    Lab Results   Component Value Date/Time     08/07/2024 1438    K 4.6 08/07/2024 1438     08/07/2024 1438    CO2 29 08/07/2024 1438    BUN 10 08/07/2024 1438    CREATININE 0.65 08/07/2024 1438    Lab Results   Component Value Date/Time    CALCIUM 9.4 08/07/2024 1438    ALKPHOS 138 (H) 08/07/2024 1438    AST 21 08/07/2024 1438    ALT 25 08/07/2024 1438    BILITOT 0.3 08/07/2024 1438           Lab Results   Component Value Date    CRP 1.81 (H) 03/06/2024      Lab Results   Component Value Date    ALT 25 08/07/2024    AST 21 08/07/2024    ALKPHOS 138 (H) 08/07/2024    BILITOT 0.3 08/07/2024        Labs reviewed and patient meets treatment conditions? Yes    DRUG SPECIFIC QUESTIONS:  Up to date on all immunizations per patient report? Yes    Do you have a history of irritable bowel disease? Yes          (If yes educate patient that treatment may cause exacerbations and/or new onset of inflammatory bowel of inflammatory bowel disease)    Any new or recent diagnosis of cancer, specifically skin cancer?Yes          (If yes notify provider prior to proceeding)    -   Any new or worsening rashes / lesions? No  ( If YES notify provider piror to proceeding)    REMINDER:   WEIGHT BASED DRUG   PREGNANCY TEST PRIOR TO FIRST INFUSION FOR WOMEN OF CHILDBEARING ABILITY.    Recommended Vitals/Observation:  Vitals: Obtain vital signs at start and end of infusion, and as needed.  Observation: No observation.         Weight Based Drug Calculations:    WEIGHT BASED DRUGS: NOT APPLICABLE / FLAT DOSE        "   Initiated By: Daniela Stark RN

## 2024-08-26 NOTE — PATIENT INSTRUCTIONS
Today :We administered secukinumab (Cosentyx) 300 mg in sodium chloride 0.9% 100 mL IVPB.     For:   1. Ankylosing spondylitis, unspecified site of spine (Multi)         Your next appointment is due in:  28 days        Please read the  Medication Guide that was given to you and reviewed during todays visit.     (Tell all doctors including dentists that you are taking this medication)     Go to the emergency room or call 911 if:  -You have signs of allergic reaction:   -Rash, hives, itching.   -Swollen, blistered, peeling skin.   -Swelling of face, lips, mouth, tongue or throat.   -Tightness of chest, trouble breathing, swallowing or talking     Call your doctor:  - If IV / injection site gets red, warm, swollen, itchy or leaks fluid or pus.     (Leave dressing on your IV site for at least 2 hours and keep area clean and dry  - If you get sick or have symptoms of infection or are not feeling well for any reason.    (Wash your hands often, stay away from people who are sick)  - If you have side effects from your medication that do not go away or are bothersome.     (Refer to the teaching your nurse gave you for side effects to call your doctor about)    - Common side effects may include:  stuffy nose, headache, feeling tired, muscle aches, upset stomach  - Before receiving any vaccines     - Call the [facility] at   If:  - You get sick, are on antibiotics, have had a recent vaccine, have surgery or dental work and your doctor wants your visit rescheduled.  - You need to cancel and reschedule your visit for any reason. Call at least 2 days before your visit if you need to cancel.   - Your insurance changes before your next visit.    (We will need to get approval from your new insurance. This can take up to two weeks.)     The [facility] is opened Monday thru Friday. We are closed on weekends and holidays.   Voice mail will take your call if the center is closed. If you leave a message  please allow 24 hours for a call back during weekdays. If you leave a message on a weekend/holiday, we will call you back the next business da

## 2024-08-28 ENCOUNTER — TELEPHONE (OUTPATIENT)
Dept: NEUROLOGY | Facility: CLINIC | Age: 44
End: 2024-08-28
Payer: COMMERCIAL

## 2024-08-30 DIAGNOSIS — R29.898 LEFT LEG WEAKNESS: ICD-10-CM

## 2024-08-30 DIAGNOSIS — G43.001 MIGRAINE WITHOUT AURA AND WITH STATUS MIGRAINOSUS, NOT INTRACTABLE: Primary | ICD-10-CM

## 2024-08-30 RX ORDER — METHYLPREDNISOLONE 4 MG/1
TABLET ORAL
Qty: 21 TABLET | Refills: 0 | Status: SHIPPED | OUTPATIENT
Start: 2024-08-30

## 2024-08-30 NOTE — TELEPHONE ENCOUNTER
I left a message that I would like her to take a Medrol dosepak for her frequent migraines and low back pain. She should take all pills for the day in the morning. I asked her to call back next week with an update.

## 2024-08-30 NOTE — PROGRESS NOTES
Chief complaint:        History of Present Illness:       There were no vitals taken for this visit.     Physical examination:      Neurologic Exam       No diagnosis found.       No orders of the defined types were placed in this encounter.         Impression and Plan:      Aubree Young MD

## 2024-09-23 ENCOUNTER — TELEPHONE (OUTPATIENT)
Dept: NEUROLOGY | Facility: CLINIC | Age: 44
End: 2024-09-23

## 2024-09-23 ENCOUNTER — APPOINTMENT (OUTPATIENT)
Dept: INFUSION THERAPY | Facility: CLINIC | Age: 44
End: 2024-09-23
Payer: COMMERCIAL

## 2024-09-23 VITALS
TEMPERATURE: 97.7 F | HEART RATE: 54 BPM | SYSTOLIC BLOOD PRESSURE: 155 MMHG | OXYGEN SATURATION: 99 % | DIASTOLIC BLOOD PRESSURE: 83 MMHG | RESPIRATION RATE: 18 BRPM

## 2024-09-23 DIAGNOSIS — M45.9 ANKYLOSING SPONDYLITIS, UNSPECIFIED SITE OF SPINE (MULTI): ICD-10-CM

## 2024-09-23 PROCEDURE — 96413 CHEMO IV INFUSION 1 HR: CPT | Performed by: REGISTERED NURSE

## 2024-09-23 RX ORDER — FAMOTIDINE 10 MG/ML
20 INJECTION INTRAVENOUS ONCE AS NEEDED
OUTPATIENT
Start: 2024-10-21

## 2024-09-23 RX ORDER — DIPHENHYDRAMINE HYDROCHLORIDE 50 MG/ML
50 INJECTION INTRAMUSCULAR; INTRAVENOUS AS NEEDED
OUTPATIENT
Start: 2024-10-21

## 2024-09-23 RX ORDER — EPINEPHRINE 0.3 MG/.3ML
0.3 INJECTION SUBCUTANEOUS EVERY 5 MIN PRN
OUTPATIENT
Start: 2024-10-21

## 2024-09-23 RX ORDER — ALBUTEROL SULFATE 0.83 MG/ML
3 SOLUTION RESPIRATORY (INHALATION) AS NEEDED
OUTPATIENT
Start: 2024-10-21

## 2024-09-23 ASSESSMENT — ENCOUNTER SYMPTOMS: HEADACHES: 1

## 2024-09-23 NOTE — PROGRESS NOTES
[facility]   Infusion Clinic Note   Date: 2024   Name: Angeline Gurrola  : 1980   MRN: [id number]          Reason for Visit: OP Infusion (Cosentyx)         Today: We administered secukinumab (Cosentyx) 300 mg in sodium chloride 0.9% 100 mL IVPB.       Visit Type: INFUSION       Ordered By: Catalinaapas       Accompanied by:Self       Diagnosis: Ankylosing spondylitis, unspecified site of spine (Multi)        Allergies:   Allergies as of 2024 - Reviewed 2024   Allergen Reaction Noted    Infliximab Anaphylaxis 07/15/2021    Zolmitriptan Palpitations and Other 2013    Penicillins Hives, Swelling, and Rash 2013    Sulfa (sulfonamide antibiotics) Hives and Swelling 2013    Cephalexin GI Upset, Nausea/vomiting, and Unknown 2013    Doxycycline GI Upset 2022          Current Medications has a current medication list which includes the following prescription(s): albuterol, alprazolam, azelastine, chlordiazepoxide-clidinium, cyanocobalamin, diclofenac-misoprostol, diphenoxylate-atropine, ergocalciferol, famotidine, ferrous sulfate (325 mg ferrous sulfate), fluticasone, furosemide, hyoscyamine, losartan-hydrochlorothiazide, methylprednisolone, metoclopramide, nebivolol, nortriptyline, ondansetron, pyridoxine, rimegepant, secukinumab, venlafaxine xr, and venlafaxine xr.       Vitals:   Vitals:    24 1305 24 1357 24 1405   BP: 154/85 142/84 155/83   Pulse: 59 54    Resp: 18 18    Temp: 37.4 °C (99.3 °F) 36.5 °C (97.7 °F)    SpO2: 95% 99%              Infusion Pre-procedure Checklist:   - Allergies reviewed: yes   - Medications reviewed: yes       - Previous reaction to current treatment: no      Assess patient for the concerns below. Document provider notification as appropriate.  - Active or recent infection with/without current antibiotic use: no  - Recent or planned invasive dental work: no  - Recent or planned surgeries: no  -  "Recently received or plans to receive vaccinations: no  - Has treatment related toxicities: no  - Is pregnant:  no      Pain: 4   - Is the pain different from normal: no   - Is your Doctor aware:  yes       Labs: N/A          Fall Risk Screening: Nayana Fall Risk  History of Falling, Immediate or Within 3 Months: No  Secondary Diagnosis: No  Ambulatory Aid: Walks without aid/bedrest/nurse assist  Intravenous Therapy/Heparin Lock: Yes  Gait/Transferring: Normal/bedrest/immobile  Mental Status: Oriented to own ability  Kraus Fall Risk Score: 20       Review Of Systems:  Review of Systems   Neurological:  Positive for headaches.         ROS completed? Yes      Infusion Readiness:  - Assessment Concerns Related to Infusion: No  - Provider notified: no      Document Below Only If Indicated:   New Patient Education:    N/A (returning patient for continuation of therapy. Ongoing education provided as needed.)        Treatment Conditions & Drug Specific Questions:    Secukinumab (COSENTYX)    (Unless otherwise specified on patient specific therapy plan):     TREATMENT CONDITIONS:  Unless otherwise specified on patient specific therapy plan HOLD and notify Provider prior to proceeding with today's infusion if patient with:  o Positive T-Spot  o Reactive Hep B and Hep C virus antibody  o Positive pregnancy prior to first treatment in women of childbearing ability    Lab Results   Component Value Date    TBSIN Negative 03/06/2024    QFG NEGATIVE 05/23/2018      Lab Results   Component Value Date    HEPBSAG Nonreactive 03/28/2024      No results found for: \"NONUHFIRE\", \"NONUHSWGH\", \"NONUHFISH\", \"EXTHEPBSAG\"  No results found for: \"HBCTI\", \"HEPBCAB\"  Lab Results   Component Value Date    HEPCAB Nonreactive 03/28/2024      Lab Results   Component Value Date    HEPCAB Nonreactive 03/28/2024       If available for review: CBC-D; CMP; CRP; LFT  Lab Results   Component Value Date    WBC 9.7 08/07/2024    HGB 12.8 08/07/2024    HCT " 42.1 08/07/2024    MCV 88 08/07/2024     08/07/2024        Chemistry    Lab Results   Component Value Date/Time     08/07/2024 1438    K 4.6 08/07/2024 1438     08/07/2024 1438    CO2 29 08/07/2024 1438    BUN 10 08/07/2024 1438    CREATININE 0.65 08/07/2024 1438    Lab Results   Component Value Date/Time    CALCIUM 9.4 08/07/2024 1438    ALKPHOS 138 (H) 08/07/2024 1438    AST 21 08/07/2024 1438    ALT 25 08/07/2024 1438    BILITOT 0.3 08/07/2024 1438           Lab Results   Component Value Date    CRP 1.81 (H) 03/06/2024      Lab Results   Component Value Date    ALT 25 08/07/2024    AST 21 08/07/2024    ALKPHOS 138 (H) 08/07/2024    BILITOT 0.3 08/07/2024        Labs reviewed and patient meets treatment conditions? Yes    DRUG SPECIFIC QUESTIONS:  Up to date on all immunizations per patient report? Yes    Do you have a history of irritable bowel disease? No          (If yes educate patient that treatment may cause exacerbations and/or new onset of inflammatory bowel of inflammatory bowel disease)    Any new or recent diagnosis of cancer, specifically skin cancer?No          (If yes notify provider prior to proceeding)    -   Any new or worsening rashes / lesions? No  ( If YES notify provider piror to proceeding)    REMINDER:   WEIGHT BASED DRUG   PREGNANCY TEST PRIOR TO FIRST INFUSION FOR WOMEN OF CHILDBEARING ABILITY.    Recommended Vitals/Observation:  Vitals: Obtain vital signs at start and end of infusion, and as needed.  Observation: No observation.         Weight Based Drug Calculations:    WEIGHT BASED DRUGS: NOT APPLICABLE / FLAT DOSE          Initiated By: Miya Miller, RN   Pt tolerated infusion well.

## 2024-09-23 NOTE — PATIENT INSTRUCTIONS
Today :We administered secukinumab (Cosentyx) 300 mg in sodium chloride 0.9% 100 mL IVPB.     For:   1. Ankylosing spondylitis, unspecified site of spine (Multi)       Your next appointment is due in:  10/21/24        Please read the  Medication Guide that was given to you and reviewed during todays visit.     (Tell all doctors including dentists that you are taking this medication)     Go to the emergency room or call 911 if:  -You have signs of allergic reaction:   -Rash, hives, itching.   -Swollen, blistered, peeling skin.   -Swelling of face, lips, mouth, tongue or throat.   -Tightness of chest, trouble breathing, swallowing or talking     Call your doctor:  - If IV / injection site gets red, warm, swollen, itchy or leaks fluid or pus.     (Leave dressing on your IV site for at least 2 hours and keep area clean and dry  - If you get sick or have symptoms of infection or are not feeling well for any reason.    (Wash your hands often, stay away from people who are sick)  - If you have side effects from your medication that do not go away or are bothersome.     (Refer to the teaching your nurse gave you for side effects to call your doctor about)    - Common side effects may include:  stuffy nose, headache, feeling tired, muscle aches, upset stomach  - Before receiving any vaccines     - [facility] at   If:  - You get sick, are on antibiotics, have had a recent vaccine, have surgery or dental work and your doctor wants your visit rescheduled.  - You need to cancel and reschedule your visit for any reason. Call at least 2 days before your visit if you need to cancel.   - Your insurance changes before your next visit.    (We will need to get approval from your new insurance. This can take up to two weeks.)     [facility] is opened Monday thru Friday. We are closed on weekends and holidays.   Voice mail will take your call if the center is closed. If you leave a message  please allow 24 hours for a call back during weekdays. If you leave a message on a weekend/holiday, we will call you back the next business day.

## 2024-09-24 NOTE — TELEPHONE ENCOUNTER
I left a message that I was not clear on the timing of the Medrol dosepak and her symptoms today and whether she got Cosentyx. She should contact Dr. Raya about her symptoms today. I will try to reach her tomorrow about her headaches.     Addendum:  I spoke to Angeline on 9/25/24. She probably got milder migraines while on the Medrol dosepak but the benefit did not last. She has had a migraine for the past 5 days. Nurtec seems to help but may take 4-5 hours to work. Two extra strength Tylenol plus ibuprofen usually does not do much.     She will see how she does with Nurtec 75 mg ODT every 48 hours for migraine prevention. We will try to get the PA approved before the office closes tomorrow.     We will also mail her a referral to consult a new neurologist with a list of local  neurologists. She can also consider consulting Daisy Dudley in [place].

## 2024-09-25 ENCOUNTER — HOSPITAL ENCOUNTER (OUTPATIENT)
Dept: RADIOLOGY | Facility: CLINIC | Age: 44
Discharge: HOME | End: 2024-09-25
Payer: COMMERCIAL

## 2024-09-25 ENCOUNTER — APPOINTMENT (OUTPATIENT)
Dept: ORTHOPEDIC SURGERY | Facility: CLINIC | Age: 44
End: 2024-09-25
Payer: COMMERCIAL

## 2024-09-25 DIAGNOSIS — G89.29 CHRONIC PAIN OF LEFT KNEE: ICD-10-CM

## 2024-09-25 DIAGNOSIS — M25.562 CHRONIC PAIN OF LEFT KNEE: ICD-10-CM

## 2024-09-25 DIAGNOSIS — E66.9 OBESITY, UNSPECIFIED CLASSIFICATION, UNSPECIFIED OBESITY TYPE, UNSPECIFIED WHETHER SERIOUS COMORBIDITY PRESENT: ICD-10-CM

## 2024-09-25 DIAGNOSIS — G43.001 MIGRAINE WITHOUT AURA AND WITH STATUS MIGRAINOSUS, NOT INTRACTABLE: Primary | ICD-10-CM

## 2024-09-25 DIAGNOSIS — M17.12 PRIMARY OSTEOARTHRITIS OF LEFT KNEE: Primary | ICD-10-CM

## 2024-09-25 PROCEDURE — 1036F TOBACCO NON-USER: CPT | Performed by: ORTHOPAEDIC SURGERY

## 2024-09-25 PROCEDURE — 73564 X-RAY EXAM KNEE 4 OR MORE: CPT | Mod: LEFT SIDE | Performed by: RADIOLOGY

## 2024-09-25 PROCEDURE — 73564 X-RAY EXAM KNEE 4 OR MORE: CPT | Mod: LT

## 2024-09-25 PROCEDURE — 99214 OFFICE O/P EST MOD 30 MIN: CPT | Performed by: ORTHOPAEDIC SURGERY

## 2024-09-25 PROCEDURE — 20610 DRAIN/INJ JOINT/BURSA W/O US: CPT | Performed by: ORTHOPAEDIC SURGERY

## 2024-09-25 RX ORDER — TRIAMCINOLONE ACETONIDE 40 MG/ML
1 INJECTION, SUSPENSION INTRA-ARTICULAR; INTRAMUSCULAR
Status: COMPLETED | OUTPATIENT
Start: 2024-09-25 | End: 2024-09-25

## 2024-09-25 RX ORDER — HYALURONATE SODIUM 20 MG/2 ML
SYRINGE (ML) INTRAARTICULAR
Qty: 6 ML | Refills: 0 | Status: SHIPPED | OUTPATIENT
Start: 2024-09-25

## 2024-09-25 ASSESSMENT — PAIN - FUNCTIONAL ASSESSMENT: PAIN_FUNCTIONAL_ASSESSMENT: 0-10

## 2024-09-25 ASSESSMENT — PAIN SCALES - GENERAL: PAINLEVEL_OUTOF10: 8

## 2024-09-25 NOTE — LETTER
September 25, 2024     Richard Dailey DO  [address] [facility] [address]    Patient: Angeline Gurrola   YOB: 1980   Date of Visit: 9/25/2024       Dear Dr. Richard Dailey DO:    Thank you for referring Angeline Gurrola to me for evaluation. Below are my notes for this consultation.  If you have questions, please do not hesitate to call me. I look forward to following your patient along with you.       Sincerely,     Bridger Jeffers MD      CC: No Recipients  ______________________________________________________________________________________    This is a consultation from Dr. Richard Dailey DO for   Chief Complaint   Patient presents with   • Left Knee - Pain       This is a 44 y.o. female who presents for evaluation of bilateral knee pain.  Patient states she has had bilateral knee pain for a long time it is worse in the left knee.  This is a longstanding chronic issues been recent exacerbated.  She complains of a sharp pain over the lateral aspect of the knee associate with popping and catching.  She has difficulty getting around.  She has had injections in the past but it has been a while.  No numbness or tingling no fevers or chills no shooting pain down the leg.  She has had arthroscopic surgery in the past has been about 10 years.  [] knee exam: skin intact no lacerations or abrations. no effusion. nttp joint line. negative log roll negative patellar grind. ROM 0-120. stable to varus and valgus stress at 0 and 30 degrees. negative lachman negative posterior drawer negative tim. 5/5 ehl/fhl/gs/ta. silt s/s/sp/dp/t. 2+ dp/pt        Physical Exam    There has been no interval change in this patient's past medical, surgical, medications, allergies, family history or social history since the most recent visit to a provider within our department. 14 point review of systems was performed, reviewed, and negative except for pertinent positives documented in the history of present illness.      Constitutional: well developed, well nourished female in no acute distress  Psychiatric: normal mood, appropriate affect  Eyes: sclera anicteric  HENT: normocephalic/atraumatic  CV: regular rate and rhythm   Respiratory: non labored breathing  Integumentary: no rash  Neurological: moves all extremities    Left t knee exam: skin intact no lacerations or abrations. no effusion.  Tender lateral joint line. negative log roll negative patellar grind. ROM 0-120. stable to varus and valgus stress at 0 and 30 degrees. negative lachman negative posterior drawer negative tim. 5/5 ehl/fhl/gs/ta. silt s/s/sp/dp/t. 2+ dp/pt        Xrays were ordered by me, they were reviewed and independently interpreted by me today, they show severe degenerative changes bone-on-bone arthritis in the lateral compartment left knee    L Inj/Asp: L knee on 9/25/2024 10:57 AM  Indications: pain and joint swelling  Details: 22 G needle, anterolateral approach  Medications: 1 mL triamcinolone acetonide 40 mg/mL    Discussion:  I discussed the conservative treatment options for knee osteoarthritis including but not limited to physical therapy, oral NSAIDS, activity and lifestyle modification, and corticosteroid injections. Pt has elected to undergo a cortisone injection today. I have explained the risk and benefits of an injection including the possibility of joint infection, bleeding, damage to cartilage, allergic reaction. Patient verbalized understanding and gave verbal consent wishes to proceed with a intra-articular cortisone injection for their knee.    Procedure:  After discussing the risk and benefits of the procedure, we proceeded with an intra-articular left knee injection. We discussed the risks and benefits and potential morbidity related to the treatment, and to the prescription medication administered in the injection    With the patient's informed verbal consent, the left knee was prepped in standard sterile fashion with  Chlorhexidine. The skin was then anesthetized with ethyl chloride spray and cleaned again with Chlorhexidine. The knee was then apirated/injected with a prefilled 20-gauge syringe of 40 mg Kenalog + 4 ml Lidocaine using the lateral approach without complications.  The patient tolerated this well and felt immediate initial relief of symptoms. A bandaid was applied and the patient ambulated out of the clinic on ther own accord without difficulty. Patient was instructed to avoid physical activity for 24-48 hours to prevent the knees from swelling and may ice the knees as tolerated. Patient should contact the office if any signs of of infection appear: redness, fever, chills, drainage, swelling or warmth to the knees.  Pt understands that the injections can be repeated no sooner than 3 months.  Procedure, treatment alternatives, risks and benefits explained, specific risks discussed. Consent was given by the patient. Immediately prior to procedure a time out was called to verify the correct patient, procedure, equipment, support staff and site/side marked as required. Patient was prepped and draped in the usual sterile fashion.             Impression/Plan: This is a 44 y.o. female with severe left knee arthritis.  I had an in depth discussion with the patient regarding treatment options for arthritis and their relative risks and benefits. We reviewed surgical and nonsurgical option for treatment. Treatments include anti inflammatory medications, physical therapy, weight loss, activity modification, use of assistive devices, injection therapies. We discussed current prescriptions and risks and benefits of continuation of prescription medication as apporpriate. We discussed that arthritis is often progressive over time, an in end stage arthritis surgical interventions can be considered, including arthroplasty. All questions were answered and the patient voiced their understanding.  Will plan to get her set up a gel  "injection.  We discussed BMI goals and weight loss strategies.  I will see her back as needed or when the gel is available    BMI Readings from Last 1 Encounters:   08/26/24 64.74 kg/m²      Lab Results   Component Value Date    CREATININE 0.65 08/07/2024     Tobacco Use: Low Risk  (9/25/2024)    Patient History    • Smoking Tobacco Use: Never    • Smokeless Tobacco Use: Never    • Passive Exposure: Not on file      Computed MELD 3.0 unavailable. One or more values for this score either were not found within the given timeframe or did not fit some other criterion.  Computed MELD-Na unavailable. One or more values for this score either were not found within the given timeframe or did not fit some other criterion.       Lab Results   Component Value Date    HGBA1C 5.5 05/17/2024     No results found for: \"STAPHMRSASCR\"  "

## 2024-09-25 NOTE — PROGRESS NOTES
This is a consultation from Dr. Richard Dailey DO for   Chief Complaint   Patient presents with    Left Knee - Pain       This is a 44 y.o. female who presents for evaluation of bilateral knee pain.  Patient states she has had bilateral knee pain for a long time it is worse in the left knee.  This is a longstanding chronic issues been recent exacerbated.  She complains of a sharp pain over the lateral aspect of the knee associate with popping and catching.  She has difficulty getting around.  She has had injections in the past but it has been a while.  No numbness or tingling no fevers or chills no shooting pain down the leg.  She has had arthroscopic surgery in the past has been about 10 years.  [] knee exam: skin intact no lacerations or abrations. no effusion. nttp joint line. negative log roll negative patellar grind. ROM 0-120. stable to varus and valgus stress at 0 and 30 degrees. negative lachman negative posterior drawer negative tim. 5/5 ehl/fhl/gs/ta. silt s/s/sp/dp/t. 2+ dp/pt        Physical Exam    There has been no interval change in this patient's past medical, surgical, medications, allergies, family history or social history since the most recent visit to a provider within our department. 14 point review of systems was performed, reviewed, and negative except for pertinent positives documented in the history of present illness.     Constitutional: well developed, well nourished female in no acute distress  Psychiatric: normal mood, appropriate affect  Eyes: sclera anicteric  HENT: normocephalic/atraumatic  CV: regular rate and rhythm   Respiratory: non labored breathing  Integumentary: no rash  Neurological: moves all extremities    Left t knee exam: skin intact no lacerations or abrations. no effusion.  Tender lateral joint line. negative log roll negative patellar grind. ROM 0-120. stable to varus and valgus stress at 0 and 30 degrees. negative lachman negative posterior drawer negative  tim. 5/5 ehl/fhl/gs/ta. silt s/s/sp/dp/t. 2+ dp/pt        Xrays were ordered by me, they were reviewed and independently interpreted by me today, they show severe degenerative changes bone-on-bone arthritis in the lateral compartment left knee    L Inj/Asp: L knee on 9/25/2024 10:57 AM  Indications: pain and joint swelling  Details: 22 G needle, anterolateral approach  Medications: 1 mL triamcinolone acetonide 40 mg/mL    Discussion:  I discussed the conservative treatment options for knee osteoarthritis including but not limited to physical therapy, oral NSAIDS, activity and lifestyle modification, and corticosteroid injections. Pt has elected to undergo a cortisone injection today. I have explained the risk and benefits of an injection including the possibility of joint infection, bleeding, damage to cartilage, allergic reaction. Patient verbalized understanding and gave verbal consent wishes to proceed with a intra-articular cortisone injection for their knee.    Procedure:  After discussing the risk and benefits of the procedure, we proceeded with an intra-articular left knee injection. We discussed the risks and benefits and potential morbidity related to the treatment, and to the prescription medication administered in the injection    With the patient's informed verbal consent, the left knee was prepped in standard sterile fashion with Chlorhexidine. The skin was then anesthetized with ethyl chloride spray and cleaned again with Chlorhexidine. The knee was then apirated/injected with a prefilled 20-gauge syringe of 40 mg Kenalog + 4 ml Lidocaine using the lateral approach without complications.  The patient tolerated this well and felt immediate initial relief of symptoms. A bandaid was applied and the patient ambulated out of the clinic on ther own accord without difficulty. Patient was instructed to avoid physical activity for 24-48 hours to prevent the knees from swelling and may ice the knees as  tolerated. Patient should contact the office if any signs of of infection appear: redness, fever, chills, drainage, swelling or warmth to the knees.  Pt understands that the injections can be repeated no sooner than 3 months.  Procedure, treatment alternatives, risks and benefits explained, specific risks discussed. Consent was given by the patient. Immediately prior to procedure a time out was called to verify the correct patient, procedure, equipment, support staff and site/side marked as required. Patient was prepped and draped in the usual sterile fashion.             Impression/Plan: This is a 44 y.o. female with severe left knee arthritis.  I had an in depth discussion with the patient regarding treatment options for arthritis and their relative risks and benefits. We reviewed surgical and nonsurgical option for treatment. Treatments include anti inflammatory medications, physical therapy, weight loss, activity modification, use of assistive devices, injection therapies. We discussed current prescriptions and risks and benefits of continuation of prescription medication as apporpriate. We discussed that arthritis is often progressive over time, an in end stage arthritis surgical interventions can be considered, including arthroplasty. All questions were answered and the patient voiced their understanding.  Will plan to get her set up a gel injection.  We discussed BMI goals and weight loss strategies.  I will see her back as needed or when the gel is available    BMI Readings from Last 1 Encounters:   08/26/24 64.74 kg/m²      Lab Results   Component Value Date    CREATININE 0.65 08/07/2024     Tobacco Use: Low Risk  (9/25/2024)    Patient History     Smoking Tobacco Use: Never     Smokeless Tobacco Use: Never     Passive Exposure: Not on file      Computed MELD 3.0 unavailable. One or more values for this score either were not found within the given timeframe or did not fit some other criterion.  Computed  "MELD-Na unavailable. One or more values for this score either were not found within the given timeframe or did not fit some other criterion.       Lab Results   Component Value Date    HGBA1C 5.5 05/17/2024     No results found for: \"STAPHMRSASCR\"  "

## 2024-09-28 ENCOUNTER — SPECIALTY PHARMACY (OUTPATIENT)
Dept: PHARMACY | Facility: CLINIC | Age: 44
End: 2024-09-28

## 2024-10-21 ENCOUNTER — APPOINTMENT (OUTPATIENT)
Dept: INFUSION THERAPY | Facility: CLINIC | Age: 44
End: 2024-10-21
Payer: COMMERCIAL

## 2024-10-21 VITALS
HEART RATE: 58 BPM | RESPIRATION RATE: 18 BRPM | SYSTOLIC BLOOD PRESSURE: 115 MMHG | DIASTOLIC BLOOD PRESSURE: 72 MMHG | TEMPERATURE: 98.2 F | OXYGEN SATURATION: 99 %

## 2024-10-21 DIAGNOSIS — M45.9 ANKYLOSING SPONDYLITIS, UNSPECIFIED SITE OF SPINE (MULTI): ICD-10-CM

## 2024-10-21 PROCEDURE — 96413 CHEMO IV INFUSION 1 HR: CPT | Performed by: REGISTERED NURSE

## 2024-10-21 RX ORDER — EPINEPHRINE 0.3 MG/.3ML
0.3 INJECTION SUBCUTANEOUS EVERY 5 MIN PRN
OUTPATIENT
Start: 2024-11-05

## 2024-10-21 RX ORDER — ALBUTEROL SULFATE 0.83 MG/ML
3 SOLUTION RESPIRATORY (INHALATION) AS NEEDED
OUTPATIENT
Start: 2024-11-05

## 2024-10-21 RX ORDER — FAMOTIDINE 10 MG/ML
20 INJECTION INTRAVENOUS ONCE AS NEEDED
OUTPATIENT
Start: 2024-11-05

## 2024-10-21 RX ORDER — DIPHENHYDRAMINE HYDROCHLORIDE 50 MG/ML
50 INJECTION INTRAMUSCULAR; INTRAVENOUS AS NEEDED
OUTPATIENT
Start: 2024-11-05

## 2024-10-21 ASSESSMENT — PAIN SCALES - GENERAL: PAINLEVEL_OUTOF10: 4

## 2024-10-21 NOTE — PATIENT INSTRUCTIONS
Today :We administered secukinumab (Cosentyx) 300 mg in sodium chloride 0.9% 100 mL IVPB.     For:   1. Ankylosing spondylitis, unspecified site of spine (Multi)         Your next appointment is due in:  28 days        Please read the  Medication Guide that was given to you and reviewed during todays visit.     (Tell all doctors including dentists that you are taking this medication)     Go to the emergency room or call 911 if:  -You have signs of allergic reaction:   -Rash, hives, itching.   -Swollen, blistered, peeling skin.   -Swelling of face, lips, mouth, tongue or throat.   -Tightness of chest, trouble breathing, swallowing or talking     Call your doctor:  - If IV / injection site gets red, warm, swollen, itchy or leaks fluid or pus.     (Leave dressing on your IV site for at least 2 hours and keep area clean and dry  - If you get sick or have symptoms of infection or are not feeling well for any reason.    (Wash your hands often, stay away from people who are sick)  - If you have side effects from your medication that do not go away or are bothersome.     (Refer to the teaching your nurse gave you for side effects to call your doctor about)    - Common side effects may include:  stuffy nose, headache, feeling tired, muscle aches, upset stomach  - Before receiving any vaccines     - Call [facility] at   If:  - You get sick, are on antibiotics, have had a recent vaccine, have surgery or dental work and your doctor wants your visit rescheduled.  - You need to cancel and reschedule your visit for any reason. Call at least 2 days before your visit if you need to cancel.   - Your insurance changes before your next visit.    (We will need to get approval from your new insurance. This can take up to two weeks.)     [facility] is opened Monday thru Friday. We are closed on weekends and holidays.   Voice mail will take your call if the center is closed. If you leave a message  please allow 24 hours for a call back during weekdays. If you leave a message on a weekend/holiday, we will call you back the next business day.

## 2024-10-21 NOTE — PROGRESS NOTES
[facility]   Infusion Clinic Note   Date: 2024   Name: Angeline Gurrola  : 1980   MRN: [id number]          Reason for Visit: OP Infusion (Cosentyx)         Today: We administered secukinumab (Cosentyx) 300 mg in sodium chloride 0.9% 100 mL IVPB.       Visit Type: INFUSION       Ordered By: Dr. Raya       Accompanied by:Self       Diagnosis: Ankylosing spondylitis, unspecified site of spine (Multi)        Allergies:   Allergies as of 10/21/2024 - Reviewed 10/21/2024   Allergen Reaction Noted    Infliximab Anaphylaxis 07/15/2021    Zolmitriptan Palpitations and Other 2013    Penicillins Hives, Swelling, and Rash 2013    Sulfa (sulfonamide antibiotics) Hives and Swelling 2013    Cephalexin GI Upset, Nausea/vomiting, and Unknown 2013    Doxycycline GI Upset 2022          Current Medications has a current medication list which includes the following prescription(s): albuterol, alprazolam, azelastine, chlordiazepoxide-clidinium, cyanocobalamin, diclofenac-misoprostol, diphenoxylate-atropine, ergocalciferol, famotidine, ferrous sulfate (325 mg ferrous sulfate), fluticasone, furosemide, hyoscyamine, losartan-hydrochlorothiazide, methylprednisolone, metoclopramide, nebivolol, nortriptyline, ondansetron, pyridoxine, rimegepant, secukinumab, euflexxa, venlafaxine xr, and venlafaxine xr.       Vitals:   Vitals:    10/21/24 1304 10/21/24 1405   BP: 106/69 115/72   Pulse: 63 58   Resp: 18 18   Temp: 36.7 °C (98.1 °F) 36.8 °C (98.2 °F)   TempSrc: Temporal    SpO2: 94% 99%   PainSc:   4    PainLoc: Knee  Comment: left              Infusion Pre-procedure Checklist:   - Allergies reviewed: yes   - Medications reviewed: yes       - Previous reaction to current treatment: no      Assess patient for the concerns below. Document provider notification as appropriate.  - Active or recent infection with/without current antibiotic use: no  - Recent or planned invasive  "dental work: no  - Recent or planned surgeries: no  - Recently received or plans to receive vaccinations: no  - Has treatment related toxicities: no  - Is pregnant:  no      Pain: 4   - Is the pain different from normal: no   - Is your Doctor aware:  n/a       Labs:  reviewed          Fall Risk Screening: Nayana Fall Risk  History of Falling, Immediate or Within 3 Months: No  Secondary Diagnosis: Yes  Ambulatory Aid: Walks without aid/bedrest/nurse assist  Intravenous Therapy/Heparin Lock: Yes  Gait/Transferring: Normal/bedrest/immobile  Mental Status: Oriented to own ability  Kraus Fall Risk Score: 35       Review Of Systems:  Review of Systems   All other systems reviewed and are negative.        ROS completed? Yes      Infusion Readiness:  - Assessment Concerns Related to Infusion: No  - Provider notified: n/a      Document Below Only If Indicated:   New Patient Education:    N/A (returning patient for continuation of therapy. Ongoing education provided as needed.)        Treatment Conditions & Drug Specific Questions:    Secukinumab (COSENTYX)    (Unless otherwise specified on patient specific therapy plan):     TREATMENT CONDITIONS:  Unless otherwise specified on patient specific therapy plan HOLD and notify Provider prior to proceeding with today's infusion if patient with:  o Positive T-Spot  o Reactive Hep B and Hep C virus antibody  o Positive pregnancy prior to first treatment in women of childbearing ability    Lab Results   Component Value Date    TBSIN Negative 03/06/2024    QFG NEGATIVE 05/23/2018      Lab Results   Component Value Date    HEPBSAG Nonreactive 03/28/2024      No results found for: \"NONUHFIRE\", \"NONUHSWGH\", \"NONUHFISH\", \"EXTHEPBSAG\"  No results found for: \"HBCTI\", \"HEPBCAB\"  Lab Results   Component Value Date    HEPCAB Nonreactive 03/28/2024      Lab Results   Component Value Date    HEPCAB Nonreactive 03/28/2024       If available for review: CBC-D; CMP; CRP; LFT  Lab Results   Component " Value Date    WBC 9.7 08/07/2024    HGB 12.8 08/07/2024    HCT 42.1 08/07/2024    MCV 88 08/07/2024     08/07/2024        Chemistry    Lab Results   Component Value Date/Time     08/07/2024 1438    K 4.6 08/07/2024 1438     08/07/2024 1438    CO2 29 08/07/2024 1438    BUN 10 08/07/2024 1438    CREATININE 0.65 08/07/2024 1438    Lab Results   Component Value Date/Time    CALCIUM 9.4 08/07/2024 1438    ALKPHOS 138 (H) 08/07/2024 1438    AST 21 08/07/2024 1438    ALT 25 08/07/2024 1438    BILITOT 0.3 08/07/2024 1438           Lab Results   Component Value Date    CRP 1.81 (H) 03/06/2024      Lab Results   Component Value Date    ALT 25 08/07/2024    AST 21 08/07/2024    ALKPHOS 138 (H) 08/07/2024    BILITOT 0.3 08/07/2024        Labs reviewed and patient meets treatment conditions? Yes    DRUG SPECIFIC QUESTIONS:  Up to date on all immunizations per patient report? Yes    Do you have a history of irritable bowel disease? Yes          (If yes educate patient that treatment may cause exacerbations and/or new onset of inflammatory bowel of inflammatory bowel disease)    Any new or recent diagnosis of cancer, specifically skin cancer?No          (If yes notify provider prior to proceeding)    -   Any new or worsening rashes / lesions? No  ( If YES notify provider piror to proceeding)    REMINDER:   WEIGHT BASED DRUG   PREGNANCY TEST PRIOR TO FIRST INFUSION FOR WOMEN OF CHILDBEARING ABILITY.    Recommended Vitals/Observation:  Vitals: Obtain vital signs at start and end of infusion, and as needed.  Observation: No observation.         Weight Based Drug Calculations:    WEIGHT BASED DRUGS: NOT APPLICABLE / FLAT DOSE          Initiated By: Kameron Dean, RN       1405 Line flushed with NS 50 ml when infusion bag completed. VSS.

## 2024-11-01 ENCOUNTER — TELEPHONE (OUTPATIENT)
Dept: PRIMARY CARE | Facility: CLINIC | Age: 44
End: 2024-11-01
Payer: COMMERCIAL

## 2024-11-01 NOTE — TELEPHONE ENCOUNTER
Patient called with open wound in upper thigh X 2 weeks - Wound has purulent exudate - whitish in color and slightly red.  PCP has no open appts - advised patient to go to Urgent Care.

## 2024-11-10 ASSESSMENT — ENCOUNTER SYMPTOMS
SORE THROAT: 0
DIARRHEA: 0
SHORTNESS OF BREATH: 0
VOMITING: 0
ANOREXIA: 0
NAIL CHANGES: 0
FEVER: 0
FATIGUE: 0
RHINORRHEA: 0
COUGH: 0
EYE PAIN: 0

## 2024-11-12 ENCOUNTER — OFFICE VISIT (OUTPATIENT)
Dept: PRIMARY CARE | Facility: CLINIC | Age: 44
End: 2024-11-12
Payer: COMMERCIAL

## 2024-11-12 VITALS
HEART RATE: 68 BPM | TEMPERATURE: 97.9 F | OXYGEN SATURATION: 98 % | BODY MASS INDEX: 43.4 KG/M2 | SYSTOLIC BLOOD PRESSURE: 118 MMHG | DIASTOLIC BLOOD PRESSURE: 62 MMHG | WEIGHT: 293 LBS | HEIGHT: 69 IN

## 2024-11-12 DIAGNOSIS — L98.491 SKIN ULCER, LIMITED TO BREAKDOWN OF SKIN: ICD-10-CM

## 2024-11-12 DIAGNOSIS — R06.02 SHORTNESS OF BREATH: Primary | ICD-10-CM

## 2024-11-12 DIAGNOSIS — M79.2 NEUROPATHIC PAIN: ICD-10-CM

## 2024-11-12 DIAGNOSIS — F41.9 ANXIETY: ICD-10-CM

## 2024-11-12 PROCEDURE — 1036F TOBACCO NON-USER: CPT | Performed by: FAMILY MEDICINE

## 2024-11-12 PROCEDURE — 99214 OFFICE O/P EST MOD 30 MIN: CPT | Performed by: FAMILY MEDICINE

## 2024-11-12 PROCEDURE — 3008F BODY MASS INDEX DOCD: CPT | Performed by: FAMILY MEDICINE

## 2024-11-12 RX ORDER — OLMESARTAN MEDOXOMIL AND HYDROCHLOROTHIAZIDE 40/25 40; 25 MG/1; MG/1
1 TABLET ORAL
COMMUNITY

## 2024-11-12 RX ORDER — ALBUTEROL SULFATE 90 UG/1
2 INHALANT RESPIRATORY (INHALATION) EVERY 4 HOURS PRN
Qty: 18 G | Refills: 6 | Status: SHIPPED | OUTPATIENT
Start: 2024-11-12

## 2024-11-12 RX ORDER — PANCRELIPASE 36000; 180000; 114000 [USP'U]/1; [USP'U]/1; [USP'U]/1
1 CAPSULE, DELAYED RELEASE PELLETS ORAL
COMMUNITY
Start: 2024-10-16

## 2024-11-12 RX ORDER — VENLAFAXINE HYDROCHLORIDE 75 MG/1
75 CAPSULE, EXTENDED RELEASE ORAL DAILY
Qty: 30 CAPSULE | Refills: 11 | Status: SHIPPED | OUTPATIENT
Start: 2024-11-12

## 2024-11-12 RX ORDER — NORTRIPTYLINE HYDROCHLORIDE 25 MG/1
125 CAPSULE ORAL NIGHTLY
Qty: 150 CAPSULE | Refills: 11 | Status: SHIPPED | OUTPATIENT
Start: 2024-11-12

## 2024-11-12 RX ORDER — VENLAFAXINE HYDROCHLORIDE 150 MG/1
150 CAPSULE, EXTENDED RELEASE ORAL DAILY
Qty: 30 CAPSULE | Refills: 11 | Status: SHIPPED | OUTPATIENT
Start: 2024-11-12

## 2024-11-12 ASSESSMENT — ENCOUNTER SYMPTOMS
COUGH: 0
LOSS OF SENSATION IN FEET: 0
LEG PAIN: 1
SHORTNESS OF BREATH: 0
ANOREXIA: 0
FATIGUE: 0
DEPRESSION: 0
EYE PAIN: 0
VOMITING: 0
OCCASIONAL FEELINGS OF UNSTEADINESS: 0
FEVER: 0
DIARRHEA: 0
NAIL CHANGES: 0
SORE THROAT: 0
RHINORRHEA: 0

## 2024-11-12 ASSESSMENT — PAIN SCALES - GENERAL: PAINLEVEL_OUTOF10: 7

## 2024-11-12 NOTE — PROGRESS NOTES
"Subjective   Patient ID: Angeline Gurrola is a 44 y.o. female who presents for Leg Pain (3 month follow up) and leg sore  (Inner thigh).    Leg pain, ulcerations on thighs b/l right worse than left  Hx C. Diff  We will have her see infectious disease/wound care-Dr. Carvalho  Neuropathic pain  Chronic anxiety-oarrs ok  Exertional dyspnea  Morbid obesity  B/l knee pain    Rash  This is a new problem. The current episode started 1 to 4 weeks ago. The problem has been gradually worsening since onset. The affected locations include the left upper leg and right upper leg. The rash is characterized by burning, pain, redness, draining, peeling and scaling. She was exposed to a new medication. Pertinent negatives include no anorexia, congestion, cough, diarrhea, eye pain, facial edema, fatigue, fever, joint pain, nail changes, rhinorrhea, shortness of breath, sore throat or vomiting.        Review of Systems   Constitutional:  Negative for fatigue and fever.   HENT:  Negative for congestion, rhinorrhea and sore throat.    Eyes:  Negative for pain.   Respiratory:  Negative for cough and shortness of breath.    Gastrointestinal:  Negative for anorexia, diarrhea and vomiting.   Musculoskeletal:  Negative for joint pain.   Skin:  Positive for rash. Negative for nail changes.       Objective   /62   Pulse 68   Temp 36.6 °C (97.9 °F) (Temporal)   Ht 1.753 m (5' 9\")   Wt (!) 188 kg (414 lb 9.6 oz)   LMP 11/08/2024 (Exact Date)   SpO2 98%   BMI 61.23 kg/m²     Physical Exam  Vitals and nursing note reviewed.   Constitutional:       Appearance: Normal appearance.   HENT:      Head: Normocephalic and atraumatic.   Eyes:      Conjunctiva/sclera: Conjunctivae normal.   Cardiovascular:      Rate and Rhythm: Normal rate and regular rhythm.      Heart sounds: Normal heart sounds.   Pulmonary:      Effort: Pulmonary effort is normal.      Breath sounds: Normal breath sounds.   Skin:     Comments: Skin ulcerations on inner thighs, " worse on right   Neurological:      Mental Status: She is oriented to person, place, and time.   Psychiatric:         Mood and Affect: Mood normal.         Behavior: Behavior normal.         Assessment/Plan   Diagnoses and all orders for this visit:  Shortness of breath  -     albuterol 90 mcg/actuation inhaler; Inhale 2 puffs every 4 hours if needed for shortness of breath or wheezing.  Neuropathic pain  -     nortriptyline (Pamelor) 25 mg capsule; Take 5 capsules (125 mg) by mouth once daily at bedtime.  Anxiety  -     venlafaxine XR (Effexor-XR) 150 mg 24 hr capsule; Take 1 capsule (150 mg) by mouth once daily. Do not crush or chew. Total 225 mg once daily  -     venlafaxine XR (Effexor-XR) 75 mg 24 hr capsule; Take 1 capsule (75 mg) by mouth once daily. Do not crush or chew. Total 225 mg once daily  -     Follow Up In Primary Care - Established; Future  Skin ulcer, limited to breakdown of skin  -     Referral to Infectious Disease; Future

## 2024-11-18 ENCOUNTER — APPOINTMENT (OUTPATIENT)
Dept: INFUSION THERAPY | Facility: CLINIC | Age: 44
End: 2024-11-18
Payer: COMMERCIAL

## 2024-11-20 ENCOUNTER — LAB REQUISITION (OUTPATIENT)
Dept: LAB | Facility: LAB | Age: 44
End: 2024-11-20
Payer: COMMERCIAL

## 2024-11-20 DIAGNOSIS — L03.115 CELLULITIS OF RIGHT LOWER LIMB: ICD-10-CM

## 2024-11-20 PROCEDURE — 87186 SC STD MICRODIL/AGAR DIL: CPT

## 2024-11-20 PROCEDURE — 87070 CULTURE OTHR SPECIMN AEROBIC: CPT

## 2024-11-20 PROCEDURE — 87077 CULTURE AEROBIC IDENTIFY: CPT

## 2024-11-20 PROCEDURE — 87205 SMEAR GRAM STAIN: CPT

## 2024-11-20 PROCEDURE — 87075 CULTR BACTERIA EXCEPT BLOOD: CPT

## 2024-11-23 LAB
BACTERIA SPEC CULT: ABNORMAL
GRAM STN SPEC: ABNORMAL
GRAM STN SPEC: ABNORMAL

## 2024-11-25 DIAGNOSIS — L03.115 CELLULITIS OF RIGHT THIGH: Primary | ICD-10-CM

## 2024-11-25 RX ORDER — FAMOTIDINE 10 MG/ML
20 INJECTION INTRAVENOUS ONCE AS NEEDED
Status: CANCELLED | OUTPATIENT
Start: 2024-11-26

## 2024-11-25 RX ORDER — HEPARIN 100 UNIT/ML
500 SYRINGE INTRAVENOUS AS NEEDED
Status: CANCELLED | OUTPATIENT
Start: 2024-11-25

## 2024-11-25 RX ORDER — EPINEPHRINE 0.3 MG/.3ML
0.3 INJECTION SUBCUTANEOUS EVERY 5 MIN PRN
Status: CANCELLED | OUTPATIENT
Start: 2024-11-26

## 2024-11-25 RX ORDER — DIPHENHYDRAMINE HYDROCHLORIDE 50 MG/ML
50 INJECTION INTRAMUSCULAR; INTRAVENOUS AS NEEDED
Status: CANCELLED | OUTPATIENT
Start: 2024-11-26

## 2024-11-25 RX ORDER — HEPARIN SODIUM,PORCINE/PF 10 UNIT/ML
50 SYRINGE (ML) INTRAVENOUS AS NEEDED
Status: CANCELLED | OUTPATIENT
Start: 2024-11-25

## 2024-11-25 RX ORDER — ALBUTEROL SULFATE 0.83 MG/ML
3 SOLUTION RESPIRATORY (INHALATION) AS NEEDED
Status: CANCELLED | OUTPATIENT
Start: 2024-11-26

## 2024-11-26 ENCOUNTER — DOCUMENTATION (OUTPATIENT)
Dept: INFUSION THERAPY | Facility: CLINIC | Age: 44
End: 2024-11-26
Payer: COMMERCIAL

## 2024-11-26 PROBLEM — L08.9 SKIN INFECTION: Status: ACTIVE | Noted: 2024-11-26

## 2024-11-26 NOTE — PROGRESS NOTES
"CLINICAL CLEARANCE FOR OUTPATIENT INFUSION      Patient prescribed Dalbavancin    For a diagnosis of: Cellulitis of Right Thigh    Review of baseline labs (as available):    LFT:   Lab Results   Component Value Date    ALT 25 08/07/2024    AST 21 08/07/2024    ALKPHOS 138 (H) 08/07/2024    BILITOT 0.3 08/07/2024        CMP/BMP: No results found for: \"CMPLAS\", \"CMPLASABS\"   Lab Results   Component Value Date    GLUCOSE 103 (H) 08/07/2024    CALCIUM 9.4 08/07/2024     08/07/2024    K 4.6 08/07/2024    CO2 29 08/07/2024     08/07/2024    BUN 10 08/07/2024    CREATININE 0.65 08/07/2024      Lab Results   Component Value Date    CREATININE 0.65 08/07/2024        CrCl: 321.475      If CrCl less than or equal to 30 pharmacy to review dosing.     Okay to schedule treatment as ordered per prescribing provider. Cosentyx is on hold until infection is cleared.   "

## 2024-11-27 ENCOUNTER — APPOINTMENT (OUTPATIENT)
Dept: CARDIOLOGY | Facility: HOSPITAL | Age: 44
End: 2024-11-27
Payer: COMMERCIAL

## 2024-11-27 ENCOUNTER — INFUSION (OUTPATIENT)
Dept: INFUSION THERAPY | Facility: CLINIC | Age: 44
End: 2024-11-27
Payer: COMMERCIAL

## 2024-11-27 VITALS
SYSTOLIC BLOOD PRESSURE: 122 MMHG | OXYGEN SATURATION: 100 % | DIASTOLIC BLOOD PRESSURE: 64 MMHG | HEART RATE: 55 BPM | TEMPERATURE: 98.1 F | RESPIRATION RATE: 16 BRPM

## 2024-11-27 DIAGNOSIS — L08.9 SKIN INFECTION: ICD-10-CM

## 2024-11-27 DIAGNOSIS — L03.115 CELLULITIS OF RIGHT THIGH: ICD-10-CM

## 2024-11-27 RX ORDER — ALBUTEROL SULFATE 0.83 MG/ML
3 SOLUTION RESPIRATORY (INHALATION) AS NEEDED
Status: CANCELLED | OUTPATIENT
Start: 2024-11-27

## 2024-11-27 RX ORDER — DIPHENHYDRAMINE HYDROCHLORIDE 50 MG/ML
50 INJECTION INTRAMUSCULAR; INTRAVENOUS AS NEEDED
Status: CANCELLED | OUTPATIENT
Start: 2024-11-27

## 2024-11-27 RX ORDER — VANCOMYCIN HYDROCHLORIDE 125 MG/1
125 CAPSULE ORAL DAILY
COMMUNITY

## 2024-11-27 RX ORDER — EPINEPHRINE 0.3 MG/.3ML
0.3 INJECTION SUBCUTANEOUS EVERY 5 MIN PRN
Status: CANCELLED | OUTPATIENT
Start: 2024-11-27

## 2024-11-27 RX ORDER — FAMOTIDINE 10 MG/ML
20 INJECTION INTRAVENOUS ONCE AS NEEDED
Status: CANCELLED | OUTPATIENT
Start: 2024-11-27

## 2024-11-27 ASSESSMENT — ENCOUNTER SYMPTOMS
MYALGIAS: 0
DIZZINESS: 1
CONSTIPATION: 0
WOUND: 1
FREQUENCY: 0
EXTREMITY WEAKNESS: 0
HEMATURIA: 0
VOMITING: 0
ARTHRALGIAS: 0
ABDOMINAL PAIN: 0
DYSURIA: 0
COUGH: 0
FATIGUE: 0
SORE THROAT: 0
UNEXPECTED WEIGHT CHANGE: 0
APPETITE CHANGE: 0
VOICE CHANGE: 0
WHEEZING: 0
EYE PROBLEMS: 0
FEVER: 1
LEG SWELLING: 0
CHILLS: 0
NAUSEA: 1
SHORTNESS OF BREATH: 0
HEADACHES: 0
BLOOD IN STOOL: 0
LIGHT-HEADEDNESS: 1
DIARRHEA: 0
NUMBNESS: 0
TROUBLE SWALLOWING: 0
PALPITATIONS: 0

## 2024-11-27 ASSESSMENT — PAIN SCALES - GENERAL: PAINLEVEL_OUTOF10: 6

## 2024-11-27 NOTE — PATIENT INSTRUCTIONS
Today :We administered (dalbavancin (Dalvance) 1,500 mg in dextrose 5% 500 mL IV).     For:   1. Cellulitis of right thigh    2. Skin infection         Your next appointment is due in:  Complete        Please read the  Medication Guide that was given to you and reviewed during todays visit.     (Tell all doctors including dentists that you are taking this medication)     Go to the emergency room or call 911 if:  -You have signs of allergic reaction:   -Rash, hives, itching.   -Swollen, blistered, peeling skin.   -Swelling of face, lips, mouth, tongue or throat.   -Tightness of chest, trouble breathing, swallowing or talking     Call your doctor:  - If IV / injection site gets red, warm, swollen, itchy or leaks fluid or pus.     (Leave dressing on your IV site for at least 2 hours and keep area clean and dry  - If you get sick or have symptoms of infection or are not feeling well for any reason.    (Wash your hands often, stay away from people who are sick)  - If you have side effects from your medication that do not go away or are bothersome.     (Refer to the teaching your nurse gave you for side effects to call your doctor about)    - Common side effects may include:  stuffy nose, headache, feeling tired, muscle aches, upset stomach  - Before receiving any vaccines     - [facility] at   If:  - You get sick, are on antibiotics, have had a recent vaccine, have surgery or dental work and your doctor wants your visit rescheduled.  - You need to cancel and reschedule your visit for any reason. Call at least 2 days before your visit if you need to cancel.   - Your insurance changes before your next visit.    (We will need to get approval from your new insurance. This can take up to two weeks.)     [facility] is opened Monday thru Friday. We are closed on weekends and holidays.   Voice mail will take your call if the center is closed. If you leave a message please allow 24  hours for a call back during weekdays. If you leave a message on a weekend/holiday, we will call you back the next business day.    A pharmacist is available Monday - Friday from 8:30AM to 3:30PM to help answer any questions you may have about your prescriptions(s). Please call pharmacy at:    [facility]: [phone]  [facility]: [phone]  [facility]: [phone]

## 2024-11-27 NOTE — PROGRESS NOTES
[facility]   Infusion Clinic Note   Date: 2024   Name: Angeline Gurrola  : 1980   MRN: [id number]          Reason for Visit: OP Infusion (Dalvance)         Today: We administered (dalbavancin (Dalvance) 1,500 mg in dextrose 5% 500 mL IV).       Visit Type: INFUSION       Ordered By: Dr Winters       Accompanied by:Self       Diagnosis: Cellulitis of right thigh    Skin infection        Allergies:   Allergies as of 2024 - Reviewed 2024   Allergen Reaction Noted    Infliximab Anaphylaxis 07/15/2021    Zolmitriptan Palpitations and Other 2013    Penicillins Hives, Swelling, and Rash 2013    Sulfa (sulfonamide antibiotics) Hives and Swelling 2013    Cephalexin GI Upset, Nausea/vomiting, and Unknown 2013    Doxycycline GI Upset 2022          Current Medications has a current medication list which includes the following prescription(s): albuterol, alprazolam, azelastine, chlordiazepoxide-clidinium, creon, cyanocobalamin, diphenoxylate-atropine, ergocalciferol, famotidine, fluticasone, furosemide, hyoscyamine, metoclopramide, nebivolol, nortriptyline, olmesartan-hydrochlorothiazide, ondansetron, secukinumab, euflexxa, vancomycin, venlafaxine xr, and venlafaxine xr.       Vitals:   Vitals:    24 1258 24 1415 24 1445   BP: (!) 122/47 117/55 122/64   Pulse: 56 54 55   Resp: 16 16 16   Temp: 36.8 °C (98.2 °F) 36.6 °C (97.8 °F) 36.7 °C (98.1 °F)   SpO2: 100%  100%   PainSc:   6     PainLoc: Leg  Comment: right thigh     LMP: 2024             Infusion Pre-procedure Checklist:   - Allergies reviewed: yes   - Medications reviewed: yes       - Previous reaction to current treatment: n/a      Assess patient for the concerns below. Document provider notification as appropriate.  - Active or recent infection with/without current antibiotic use: yes  - Recent or planned invasive dental work: n/a  - Recent or planned surgeries:  n/a  - Recently received or plans to receive vaccinations: n/a  - Has treatment related toxicities: n/a  - Is pregnant:  no      Pain: 6   - Is the pain different from normal: yes   - Is your Doctor aware:  yes       Labs: N/A          Fall Risk Screening: Kraus Fall Risk  History of Falling, Immediate or Within 3 Months: Yes  Secondary Diagnosis: No  Ambulatory Aid: Walks without aid/bedrest/nurse assist  Intravenous Therapy/Heparin Lock: Yes  Gait/Transferring: Normal/bedrest/immobile  Mental Status: Oriented to own ability  Kraus Fall Risk Score: 45       Review Of Systems:  Review of Systems   Constitutional:  Positive for fever. Negative for appetite change, chills, fatigue and unexpected weight change.   HENT:   Negative for hearing loss, mouth sores, sore throat, tinnitus, trouble swallowing and voice change.    Eyes:  Negative for eye problems.   Respiratory:  Negative for cough, shortness of breath and wheezing.    Cardiovascular:  Negative for chest pain, leg swelling and palpitations.   Gastrointestinal:  Positive for nausea. Negative for abdominal pain, blood in stool, constipation, diarrhea and vomiting.   Genitourinary:  Negative for dysuria, frequency and hematuria.    Musculoskeletal:  Negative for arthralgias and myalgias.   Skin:  Positive for wound. Negative for itching and rash.        Open and draining right thigh cellulitis, draining yellow   Neurological:  Positive for dizziness and light-headedness. Negative for extremity weakness, headaches and numbness.         ROS completed? Yes      Infusion Readiness:  - Assessment Concerns Related to Infusion: No  - Provider notified: n/a      Document Below Only If Indicated:   New Patient Education:    NEW PATIENT MEDICATION EDUCATION PT PROVIDED WITH WRITTEN ([employer] PT EDUCATION SHEET) AND VERBAL EDUCATION REGARDING MEDICATION GIVEN. VERIFIED MEDICATION NAME WITH PATIENT AND DISCUSSED REASON FOR USE. BRIEFLY DISCUSSED HOW MEDICATION WORKS AND  "EDUCATED ON GOAL OF TREATMENT, FREQUENCY OF TREATMENT, ADVERSE RXN'S AND COMMON SIDE EFFECTS TO MONITOR FOR. INSTRUCTED PT TO ASSURE THAT ALL PROVIDERS INCLUDING DENTISTS ARE AWARE OF MEDICATION RECEIVED. DISCUSSED FLOW OF VISIT AND ORIENTED TO INFUSION CENTER. PT VERBALIZES UNDERSTANDING. CALL LIGHT PROVIDED AND PT AWARE TO ALERT STAFF OF ANY CONCERNS DURING TREATMENT.        Treatment Conditions & Drug Specific Questions:    Dalbavancin (DALVANCE)  Monitoring Parameters:  Vitals: Start of infusion, end of infusion, end of observation period and as needed.  Observation: Observe for 30 minutes after FIRST infusion.     Nursing Physical Assessment/Monitoring:    Any symptoms of C-Diff which may include: bloody diarrhea, nausea, vomiting? No     (If YES to the above notify prescribing provider prior to proceeding with infusion)    Check ordered labs and report abnormalities as appropriate:    No results found for: \"CMPLAS\", \"CMPLASABS\"   Lab Results   Component Value Date    GLUCOSE 103 (H) 08/07/2024    CALCIUM 9.4 08/07/2024     08/07/2024    K 4.6 08/07/2024    CO2 29 08/07/2024     08/07/2024    BUN 10 08/07/2024    CREATININE 0.65 08/07/2024        Lab Results   Component Value Date    ALT 25 08/07/2024    AST 21 08/07/2024    ALKPHOS 138 (H) 08/07/2024    BILITOT 0.3 08/07/2024               Weight Based Drug Calculations:    WEIGHT BASED DRUGS: NOT APPLICABLE / FLAT DOSE          Initiated By: Jessica Johnson, RN   5146 30 minutes post infusion vital signs stable. Patient tolerated well, no signs or symptoms of reaction.        Patient's questions were answered by nursing staff at the time of their infusion/injection visit. Patient was not independently evaluated by the Nurse Practitioner on site at the [facility].     11/27/24 at 3:00 PM - AGUSTO Duque    "

## 2024-12-12 ENCOUNTER — LAB (OUTPATIENT)
Dept: LAB | Facility: LAB | Age: 44
End: 2024-12-12
Payer: COMMERCIAL

## 2024-12-12 DIAGNOSIS — N39.0 URINARY TRACT INFECTION, SITE NOT SPECIFIED: Primary | ICD-10-CM

## 2024-12-12 DIAGNOSIS — M45.9 ANKYLOSING SPONDYLITIS, UNSPECIFIED SITE OF SPINE (MULTI): ICD-10-CM

## 2024-12-12 LAB
ALBUMIN SERPL BCP-MCNC: 3.7 G/DL (ref 3.4–5)
ALP SERPL-CCNC: 121 U/L (ref 33–110)
ALT SERPL W P-5'-P-CCNC: 18 U/L (ref 7–45)
ANION GAP SERPL CALC-SCNC: 16 MMOL/L (ref 10–20)
AST SERPL W P-5'-P-CCNC: 16 U/L (ref 9–39)
BASOPHILS # BLD AUTO: 0.09 X10*3/UL (ref 0–0.1)
BASOPHILS NFR BLD AUTO: 1 %
BILIRUB SERPL-MCNC: 0.5 MG/DL (ref 0–1.2)
BUN SERPL-MCNC: 18 MG/DL (ref 6–23)
CALCIUM SERPL-MCNC: 9.3 MG/DL (ref 8.6–10.3)
CHLORIDE SERPL-SCNC: 98 MMOL/L (ref 98–107)
CO2 SERPL-SCNC: 32 MMOL/L (ref 21–32)
CREAT SERPL-MCNC: 0.91 MG/DL (ref 0.5–1.05)
EGFRCR SERPLBLD CKD-EPI 2021: 79 ML/MIN/1.73M*2
EOSINOPHIL # BLD AUTO: 0.31 X10*3/UL (ref 0–0.7)
EOSINOPHIL NFR BLD AUTO: 3.4 %
ERYTHROCYTE [DISTWIDTH] IN BLOOD BY AUTOMATED COUNT: 14.7 % (ref 11.5–14.5)
GLUCOSE SERPL-MCNC: 94 MG/DL (ref 74–99)
HCT VFR BLD AUTO: 41.8 % (ref 36–46)
HGB BLD-MCNC: 12.7 G/DL (ref 12–16)
IMM GRANULOCYTES # BLD AUTO: 0.02 X10*3/UL (ref 0–0.7)
IMM GRANULOCYTES NFR BLD AUTO: 0.2 % (ref 0–0.9)
LYMPHOCYTES # BLD AUTO: 2.27 X10*3/UL (ref 1.2–4.8)
LYMPHOCYTES NFR BLD AUTO: 25.1 %
MCH RBC QN AUTO: 28.5 PG (ref 26–34)
MCHC RBC AUTO-ENTMCNC: 30.4 G/DL (ref 32–36)
MCV RBC AUTO: 94 FL (ref 80–100)
MONOCYTES # BLD AUTO: 0.59 X10*3/UL (ref 0.1–1)
MONOCYTES NFR BLD AUTO: 6.5 %
NEUTROPHILS # BLD AUTO: 5.75 X10*3/UL (ref 1.2–7.7)
NEUTROPHILS NFR BLD AUTO: 63.8 %
NRBC BLD-RTO: 0 /100 WBCS (ref 0–0)
PLATELET # BLD AUTO: 343 X10*3/UL (ref 150–450)
POTASSIUM SERPL-SCNC: 4.3 MMOL/L (ref 3.5–5.3)
PROT SERPL-MCNC: 6.4 G/DL (ref 6.4–8.2)
RBC # BLD AUTO: 4.46 X10*6/UL (ref 4–5.2)
SODIUM SERPL-SCNC: 142 MMOL/L (ref 136–145)
WBC # BLD AUTO: 9 X10*3/UL (ref 4.4–11.3)

## 2024-12-12 PROCEDURE — 85025 COMPLETE CBC W/AUTO DIFF WBC: CPT

## 2024-12-12 PROCEDURE — 80053 COMPREHEN METABOLIC PANEL: CPT

## 2024-12-12 PROCEDURE — 87086 URINE CULTURE/COLONY COUNT: CPT

## 2024-12-12 PROCEDURE — 36415 COLL VENOUS BLD VENIPUNCTURE: CPT

## 2024-12-13 LAB — BACTERIA UR CULT: NORMAL

## 2024-12-16 ENCOUNTER — APPOINTMENT (OUTPATIENT)
Dept: INFUSION THERAPY | Facility: CLINIC | Age: 44
End: 2024-12-16
Payer: COMMERCIAL

## 2024-12-16 VITALS
SYSTOLIC BLOOD PRESSURE: 111 MMHG | RESPIRATION RATE: 18 BRPM | OXYGEN SATURATION: 99 % | WEIGHT: 293 LBS | DIASTOLIC BLOOD PRESSURE: 71 MMHG | BODY MASS INDEX: 59.07 KG/M2 | TEMPERATURE: 98.1 F | HEART RATE: 56 BPM

## 2024-12-16 DIAGNOSIS — M45.9 ANKYLOSING SPONDYLITIS, UNSPECIFIED SITE OF SPINE (MULTI): ICD-10-CM

## 2024-12-16 PROCEDURE — 96413 CHEMO IV INFUSION 1 HR: CPT | Performed by: REGISTERED NURSE

## 2024-12-16 RX ORDER — EPINEPHRINE 0.3 MG/.3ML
0.3 INJECTION SUBCUTANEOUS EVERY 5 MIN PRN
OUTPATIENT
Start: 2025-01-13

## 2024-12-16 RX ORDER — ALBUTEROL SULFATE 0.83 MG/ML
3 SOLUTION RESPIRATORY (INHALATION) AS NEEDED
OUTPATIENT
Start: 2025-01-13

## 2024-12-16 RX ORDER — FAMOTIDINE 10 MG/ML
20 INJECTION INTRAVENOUS ONCE AS NEEDED
OUTPATIENT
Start: 2025-01-13

## 2024-12-16 RX ORDER — DIPHENHYDRAMINE HYDROCHLORIDE 50 MG/ML
50 INJECTION INTRAMUSCULAR; INTRAVENOUS AS NEEDED
OUTPATIENT
Start: 2025-01-13

## 2024-12-16 ASSESSMENT — ENCOUNTER SYMPTOMS
BLOOD IN STOOL: 0
DYSURIA: 0
FREQUENCY: 0
VOICE CHANGE: 0
LEG SWELLING: 0
CONSTIPATION: 0
VOMITING: 0
TROUBLE SWALLOWING: 0
WHEEZING: 0
FATIGUE: 0
MYALGIAS: 0
EYE PROBLEMS: 0
DIARRHEA: 0
WOUND: 0
SORE THROAT: 0
DIZZINESS: 0
ARTHRALGIAS: 1
NUMBNESS: 0
UNEXPECTED WEIGHT CHANGE: 0
NAUSEA: 0
FEVER: 0
LIGHT-HEADEDNESS: 0
ABDOMINAL PAIN: 0
APPETITE CHANGE: 0
EXTREMITY WEAKNESS: 0
PALPITATIONS: 0
CHILLS: 0
COUGH: 0
HEMATURIA: 0
HEADACHES: 0
SHORTNESS OF BREATH: 0

## 2024-12-16 ASSESSMENT — PAIN SCALES - GENERAL: PAINLEVEL_OUTOF10: 7

## 2024-12-16 NOTE — PATIENT INSTRUCTIONS
Today :We administered secukinumab (Cosentyx) 300 mg in sodium chloride 0.9% 100 mL IVPB.     For:   1. Ankylosing spondylitis, unspecified site of spine (Multi)         Your next appointment is due in:  28 days        Please read the  Medication Guide that was given to you and reviewed during todays visit.     (Tell all doctors including dentists that you are taking this medication)     Go to the emergency room or call 911 if:  -You have signs of allergic reaction:   -Rash, hives, itching.   -Swollen, blistered, peeling skin.   -Swelling of face, lips, mouth, tongue or throat.   -Tightness of chest, trouble breathing, swallowing or talking     Call your doctor:  - If IV / injection site gets red, warm, swollen, itchy or leaks fluid or pus.     (Leave dressing on your IV site for at least 2 hours and keep area clean and dry  - If you get sick or have symptoms of infection or are not feeling well for any reason.    (Wash your hands often, stay away from people who are sick)  - If you have side effects from your medication that do not go away or are bothersome.     (Refer to the teaching your nurse gave you for side effects to call your doctor about)    - Common side effects may include:  stuffy nose, headache, feeling tired, muscle aches, upset stomach  - Before receiving any vaccines     - Call [facility] at   If:  - You get sick, are on antibiotics, have had a recent vaccine, have surgery or dental work and your doctor wants your visit rescheduled.  - You need to cancel and reschedule your visit for any reason. Call at least 2 days before your visit if you need to cancel.   - Your insurance changes before your next visit.    (We will need to get approval from your new insurance. This can take up to two weeks.)     [facility] is opened Monday thru Friday. We are closed on weekends and holidays.   Voice mail will take your call if the center is closed. If you leave a message  please allow 24 hours for a call back during weekdays. If you leave a message on a weekend/holiday, we will call you back the next business day.    A pharmacist is available Monday - Friday from 8:30AM to 3:30PM to help answer any questions you may have about your prescriptions(s). Please call pharmacy at:    [facility]: [phone]  [facility]: [phone]  [facility]: [phone]

## 2024-12-16 NOTE — PROGRESS NOTES
[facility]   Infusion Clinic Note   Date: 2024   Name: Angeline Gurrola  : 1980   MRN: [id number]          Reason for Visit: OP Infusion (Consentyx)         Today: We administered secukinumab (Cosentyx) 300 mg in sodium chloride 0.9% 100 mL IVPB.       Visit Type: INFUSION       Ordered By: Dr. Raya       Accompanied by:Self       Diagnosis: Ankylosing spondylitis, unspecified site of spine (Multi)        Allergies:   Allergies as of 2024 - Reviewed 2024   Allergen Reaction Noted    Infliximab Anaphylaxis 07/15/2021    Zolmitriptan Palpitations and Other 2013    Penicillins Hives, Swelling, and Rash 2013    Sulfa (sulfonamide antibiotics) Hives and Swelling 2013    Cephalexin GI Upset, Nausea/vomiting, and Unknown 2013    Doxycycline GI Upset 2022          Current Medications has a current medication list which includes the following prescription(s): ubrogepant, albuterol, alprazolam, azelastine, chlordiazepoxide-clidinium, creon, cyanocobalamin, diphenoxylate-atropine, ergocalciferol, famotidine, fluticasone, furosemide, hyoscyamine, metoclopramide, nebivolol, nortriptyline, olmesartan-hydrochlorothiazide, ondansetron, secukinumab, euflexxa, vancomycin, venlafaxine xr, and venlafaxine xr.       Vitals:   Vitals:    24 1301 24 1441   BP: 110/71 111/71   Pulse: 70 56   Resp: 18 18   Temp: 36.4 °C (97.5 °F) 36.7 °C (98.1 °F)   TempSrc: Temporal    SpO2: 99%    Weight: (!) 181 kg (400 lb)    PainSc:   7    PainLoc: Back  Comment: lower back chronic              Infusion Pre-procedure Checklist:   - Allergies reviewed: yes   - Medications reviewed: yes       - Previous reaction to current treatment: no      Assess patient for the concerns below. Document provider notification as appropriate.  - Active or recent infection with/without current antibiotic use: no  - Recent or planned invasive dental work: no  - Recent or  planned surgeries: no  - Recently received or plans to receive vaccinations: no  - Has treatment related toxicities: no  - Is pregnant:  no      Pain: 0   - Is the pain different from normal: no   - Is your Doctor aware:  n/a       Labs:  reviewed          Fall Risk Screening: Kraus Fall Risk  History of Falling, Immediate or Within 3 Months: No  Secondary Diagnosis: Yes  Ambulatory Aid: Walks without aid/bedrest/nurse assist  Intravenous Therapy/Heparin Lock: Yes  Gait/Transferring: Normal/bedrest/immobile  Mental Status: Oriented to own ability  Kraus Fall Risk Score: 35       Review Of Systems:  Review of Systems   Constitutional:  Negative for appetite change, chills, fatigue, fever and unexpected weight change.   HENT:   Negative for hearing loss, mouth sores, sore throat, tinnitus, trouble swallowing and voice change.    Eyes:  Negative for eye problems.   Respiratory:  Negative for cough, shortness of breath and wheezing.    Cardiovascular:  Negative for chest pain, leg swelling and palpitations.   Gastrointestinal:  Negative for abdominal pain, blood in stool, constipation, diarrhea, nausea and vomiting.   Genitourinary:  Negative for dysuria, frequency and hematuria.    Musculoskeletal:  Positive for arthralgias. Negative for myalgias.   Skin:  Negative for itching, rash and wound.   Neurological:  Negative for dizziness, extremity weakness, headaches, light-headedness and numbness.         ROS completed? Yes      Infusion Readiness:  - Assessment Concerns Related to Infusion: No  - Provider notified: n/a      Document Below Only If Indicated:   New Patient Education:    N/A (returning patient for continuation of therapy. Ongoing education provided as needed.)        Treatment Conditions & Drug Specific Questions:    Secukinumab (COSENTYX)    (Unless otherwise specified on patient specific therapy plan):     TREATMENT CONDITIONS:  Unless otherwise specified on patient specific therapy plan HOLD and notify  "Provider prior to proceeding with today's infusion if patient with:  o Positive T-Spot  o Reactive Hep B and Hep C virus antibody  o Positive pregnancy prior to first treatment in women of childbearing ability    Lab Results   Component Value Date    TBSIN Negative 03/06/2024    QFG NEGATIVE 05/23/2018      Lab Results   Component Value Date    HEPBSAG Nonreactive 03/28/2024      No results found for: \"NONUHFIRE\", \"NONUHSWGH\", \"NONUHFISH\", \"EXTHEPBSAG\"  No results found for: \"HBCTI\", \"HEPBCAB\"  Lab Results   Component Value Date    HEPCAB Nonreactive 03/28/2024      Lab Results   Component Value Date    HEPCAB Nonreactive 03/28/2024       If available for review: CBC-D; CMP; CRP; LFT  Lab Results   Component Value Date    WBC 9.0 12/12/2024    HGB 12.7 12/12/2024    HCT 41.8 12/12/2024    MCV 94 12/12/2024     12/12/2024        Chemistry    Lab Results   Component Value Date/Time     12/12/2024 1301    K 4.3 12/12/2024 1301    CL 98 12/12/2024 1301    CO2 32 12/12/2024 1301    BUN 18 12/12/2024 1301    CREATININE 0.91 12/12/2024 1301    Lab Results   Component Value Date/Time    CALCIUM 9.3 12/12/2024 1301    ALKPHOS 121 (H) 12/12/2024 1301    AST 16 12/12/2024 1301    ALT 18 12/12/2024 1301    BILITOT 0.5 12/12/2024 1301           Lab Results   Component Value Date    CRP 1.81 (H) 03/06/2024      Lab Results   Component Value Date    ALT 18 12/12/2024    AST 16 12/12/2024    ALKPHOS 121 (H) 12/12/2024    BILITOT 0.5 12/12/2024        Labs reviewed and patient meets treatment conditions? Yes    DRUG SPECIFIC QUESTIONS:  Up to date on all immunizations per patient report? Yes    Do you have a history of irritable bowel disease? Yes          (If yes educate patient that treatment may cause exacerbations and/or new onset of inflammatory bowel of inflammatory bowel disease)    Any new or recent diagnosis of cancer, specifically skin cancer?No          (If yes notify provider prior to proceeding)    -   Any " new or worsening rashes / lesions? No  ( If YES notify provider piror to proceeding)    REMINDER:   WEIGHT BASED DRUG   PREGNANCY TEST PRIOR TO FIRST INFUSION FOR WOMEN OF CHILDBEARING ABILITY.    Recommended Vitals/Observation:  Vitals: Obtain vital signs at start and end of infusion, and as needed.  Observation: No observation.         Weight Based Drug Calculations:    WEIGHT BASED DRUGS: Secukinumab (COSENTYX)   Patient's dosing weight (kg): flat dose         Patient's weight today:   Vitals:    12/16/24 1301   Weight: (!) 181 kg (400 lb)         weight range for prescribed dose:     Patient weight today falls outside of 10% variance or  weight range: No     Home Care pharmacist informed of weight variance: Not applicable    Doses that are weight based have an acceptable variance rule within 10% of the prescribed   order and/or within  weight range. If patient weight on day of infusion falls   outside of the 10% variance, or weight range, infusion is administered and   pharmacy contacted regarding future dosing adjustments, per policy.         Initiated By: Kameron Dean RN     3300 Line flushed with NS 50 ml when infusion bag completed. Patient tolerating well.

## 2024-12-20 ENCOUNTER — LAB (OUTPATIENT)
Dept: LAB | Facility: LAB | Age: 44
End: 2024-12-20
Payer: COMMERCIAL

## 2024-12-20 DIAGNOSIS — N39.0 URINARY TRACT INFECTION, SITE NOT SPECIFIED: Primary | ICD-10-CM

## 2024-12-20 LAB
APPEARANCE UR: ABNORMAL
BACTERIA #/AREA URNS AUTO: ABNORMAL /HPF
BILIRUB UR STRIP.AUTO-MCNC: NEGATIVE MG/DL
COLOR UR: YELLOW
GLUCOSE UR STRIP.AUTO-MCNC: NORMAL MG/DL
HYALINE CASTS #/AREA URNS AUTO: ABNORMAL /LPF
KETONES UR STRIP.AUTO-MCNC: NEGATIVE MG/DL
LEUKOCYTE ESTERASE UR QL STRIP.AUTO: ABNORMAL
MUCOUS THREADS #/AREA URNS AUTO: ABNORMAL /LPF
NITRITE UR QL STRIP.AUTO: NEGATIVE
PH UR STRIP.AUTO: 5 [PH]
PROT UR STRIP.AUTO-MCNC: ABNORMAL MG/DL
RBC # UR STRIP.AUTO: NEGATIVE /UL
RBC #/AREA URNS AUTO: >20 /HPF
SP GR UR STRIP.AUTO: 1.03
SQUAMOUS #/AREA URNS AUTO: ABNORMAL /HPF
UROBILINOGEN UR STRIP.AUTO-MCNC: ABNORMAL MG/DL
WBC #/AREA URNS AUTO: ABNORMAL /HPF

## 2024-12-20 PROCEDURE — 87086 URINE CULTURE/COLONY COUNT: CPT

## 2024-12-20 PROCEDURE — 81001 URINALYSIS AUTO W/SCOPE: CPT

## 2024-12-22 LAB — BACTERIA UR CULT: NORMAL

## 2024-12-24 DIAGNOSIS — N39.0 URINARY TRACT INFECTION, SITE NOT SPECIFIED: Primary | ICD-10-CM

## 2024-12-28 ENCOUNTER — OFFICE VISIT (OUTPATIENT)
Dept: URGENT CARE | Age: 44
End: 2024-12-28
Payer: COMMERCIAL

## 2024-12-28 VITALS
RESPIRATION RATE: 18 BRPM | DIASTOLIC BLOOD PRESSURE: 67 MMHG | HEART RATE: 60 BPM | SYSTOLIC BLOOD PRESSURE: 126 MMHG | WEIGHT: 293 LBS | OXYGEN SATURATION: 94 % | BODY MASS INDEX: 44.41 KG/M2 | HEIGHT: 68 IN | TEMPERATURE: 98.5 F

## 2024-12-28 DIAGNOSIS — R05.1 ACUTE COUGH: ICD-10-CM

## 2024-12-28 DIAGNOSIS — J01.90 ACUTE NON-RECURRENT SINUSITIS, UNSPECIFIED LOCATION: Primary | ICD-10-CM

## 2024-12-28 DIAGNOSIS — J06.9 URI, ACUTE: ICD-10-CM

## 2024-12-28 RX ORDER — AZITHROMYCIN 250 MG/1
TABLET, FILM COATED ORAL
Qty: 6 TABLET | Refills: 0 | Status: SHIPPED | OUTPATIENT
Start: 2024-12-28

## 2024-12-28 RX ORDER — BENZONATATE 200 MG/1
200 CAPSULE ORAL 3 TIMES DAILY PRN
Qty: 30 CAPSULE | Refills: 0 | Status: SHIPPED | OUTPATIENT
Start: 2024-12-28 | End: 2025-01-04

## 2024-12-28 NOTE — PROGRESS NOTES
Subjective   Patient ID: Angeline Gurrola is a 44 y.o. female. They present today with a chief complaint of Cough (5 days. [place] when she coughs.) and Fever (For 3 days).    History of Present Illness  44-year-old female presents urgent care for complaint of cough and mildly productive for past 5 days as well as a Weaver fevers with past 3 days.  States she also has had some sinus congestion/pressure for past 10 days with some postnasal drip.  Denies any current nausea or vomiting, chest pain shortness of breath, abdominal pain, wheezing, palpitations, abdominal pain.  States she does have allergies to penicillins, sulfa antibiotics, cephalosporins, doxycycline.  States she has taken Tessalon Perles and azithromycin in the past without complication.  Discussed chest x-ray and patient declined chest x-ray at this time.  Prescribed azithromycin and Tessalon Perles.  Educated on supportive care.  Follow-up PCP in 5 to 7 days.  Return/ER precautions.  Patient agrees with plan.          Past Medical History  Allergies as of 12/28/2024 - Reviewed 12/28/2024   Allergen Reaction Noted    Infliximab Anaphylaxis 07/15/2021    Zolmitriptan Palpitations and Other 06/06/2013    Penicillins Hives, Swelling, and Rash 06/06/2013    Sulfa (sulfonamide antibiotics) Hives and Swelling 06/06/2013    Cephalexin GI Upset, Nausea/vomiting, and Unknown 06/06/2013    Doxycycline GI Upset 05/24/2022       (Not in a hospital admission)       Past Medical History:   Diagnosis Date    Ankylosing spondylitis     Anxiety 2015    Asthma     Depression 01/01/2016    Difficulty walking 2016    Encounter for therapeutic drug level monitoring 05/23/2018    Encounter for methotrexate monitoring    GERD (gastroesophageal reflux disease) 2002    Headache 2023    Hypertension 2014    Insomnia 2011    Migraine 11/01/2023    Other obesity due to excess calories     Exogenous obesity    Personal history of other diseases of the circulatory system     History of  "peripheral vascular disease    Personal history of other diseases of the circulatory system     History of hypertension    Vision loss 10/01/2023    Visual impairment 2023    Weakness of limb 07/01/2020       Past Surgical History:   Procedure Laterality Date    ANKLE SURGERY  08/28/2013    Ankle Surgery    CHOLECYSTECTOMY      KNEE ARTHROSCOPY W/ DEBRIDEMENT  08/28/2013    Arthroscopy Knee    OOPHORECTOMY  08/28/2013    Oophorectomy        reports that she has never smoked. She has never used smokeless tobacco. She reports that she does not currently use alcohol. She reports that she does not use drugs.    Review of Systems  Review of Systems   All other systems reviewed and are negative.                                 Objective    Vitals:    12/28/24 1003   BP: 126/67   Pulse: 60   Resp: 18   Temp: 36.9 °C (98.5 °F)   SpO2: 94%   Weight: (!) 185 kg (408 lb)   Height: 1.727 m (5' 8\")     Patient's last menstrual period was 12/07/2024 (approximate).    Physical Exam  Vitals reviewed.   Constitutional:       General: She is not in acute distress.     Appearance: Normal appearance. She is not ill-appearing, toxic-appearing or diaphoretic.   HENT:      Head: Normocephalic and atraumatic.      Comments: Sinus congestion.     Right Ear: Tympanic membrane, ear canal and external ear normal.      Left Ear: Tympanic membrane, ear canal and external ear normal.      Nose: Congestion present.      Mouth/Throat:      Mouth: Mucous membranes are moist.      Comments: Tonsils and pharynx unremarkable.  Uvula midline.  Postnasal drip.  Cardiovascular:      Rate and Rhythm: Normal rate and regular rhythm.   Pulmonary:      Effort: Pulmonary effort is normal. No respiratory distress.      Breath sounds: Normal breath sounds. No stridor. No wheezing, rhonchi or rales.   Abdominal:      General: Abdomen is flat.      Palpations: Abdomen is soft.      Tenderness: There is no abdominal tenderness. There is no right CVA tenderness or " left CVA tenderness.   Musculoskeletal:      Cervical back: Normal range of motion and neck supple. No rigidity or tenderness.   Lymphadenopathy:      Cervical: No cervical adenopathy.   Skin:     General: Skin is warm and dry.   Neurological:      General: No focal deficit present.      Mental Status: She is alert and oriented to person, place, and time.   Psychiatric:         Mood and Affect: Mood normal.         Behavior: Behavior normal.         Procedures    Point of Care Test & Imaging Results from this visit  No results found for this visit on 12/28/24.   No results found.    Diagnostic study results (if any) were reviewed by Claudia Tran PA-C.    Assessment/Plan   Allergies, medications, history, and pertinent labs/EKGs/Imaging reviewed by Claudia Tran PA-C.     Medical Decision Making  44-year-old female presents urgent care for complaint of cough and mildly productive for past 5 days as well as a Weaver fevers with past 3 days.  States she also has had some sinus congestion/pressure for past 10 days with some postnasal drip.  Denies any current nausea or vomiting, chest pain shortness of breath, abdominal pain, wheezing, palpitations, abdominal pain.  States she does have allergies to penicillins, sulfa antibiotics, cephalosporins, doxycycline.  States she has taken Tessalon Perles and azithromycin in the past without complication.  Discussed chest x-ray and patient declined chest x-ray at this time.  Prescribed azithromycin and Tessalon Perles.  Educated on supportive care.  Follow-up PCP in 5 to 7 days.  Return/ER precautions.  Patient agrees with plan.    Orders and Diagnoses  There are no diagnoses linked to this encounter.    Medical Admin Record      Patient disposition: Home    Electronically signed by Claudia Tran PA-C  10:53 AM

## 2024-12-28 NOTE — PATIENT INSTRUCTIONS
Take medications as prescribed.  Maintain adequate hydration and nutrition.  If symptoms worsen, do not improve, or any other concerning or worrisome symptoms develop please return to the clinic or go to the emergency department.  Follow-up with PCP in 5 to 7 days.

## 2024-12-29 DIAGNOSIS — F41.9 ANXIETY: ICD-10-CM

## 2024-12-29 RX ORDER — ALPRAZOLAM 1 MG/1
1 TABLET ORAL 2 TIMES DAILY PRN
Qty: 30 TABLET | Refills: 0 | Status: SHIPPED | OUTPATIENT
Start: 2024-12-29

## 2025-01-03 ENCOUNTER — OFFICE VISIT (OUTPATIENT)
Dept: PRIMARY CARE | Facility: CLINIC | Age: 45
End: 2025-01-03
Payer: COMMERCIAL

## 2025-01-03 VITALS
TEMPERATURE: 98.1 F | SYSTOLIC BLOOD PRESSURE: 128 MMHG | OXYGEN SATURATION: 99 % | DIASTOLIC BLOOD PRESSURE: 70 MMHG | HEART RATE: 62 BPM | HEIGHT: 68 IN | BODY MASS INDEX: 44.41 KG/M2 | WEIGHT: 293 LBS

## 2025-01-03 DIAGNOSIS — R06.09 EXERTIONAL DYSPNEA: ICD-10-CM

## 2025-01-03 DIAGNOSIS — R11.0 NAUSEA: ICD-10-CM

## 2025-01-03 DIAGNOSIS — R35.0 FREQUENT URINATION: ICD-10-CM

## 2025-01-03 DIAGNOSIS — R05.1 ACUTE COUGH: ICD-10-CM

## 2025-01-03 DIAGNOSIS — J40 BRONCHITIS: Primary | ICD-10-CM

## 2025-01-03 PROCEDURE — 1036F TOBACCO NON-USER: CPT | Performed by: FAMILY MEDICINE

## 2025-01-03 PROCEDURE — 99214 OFFICE O/P EST MOD 30 MIN: CPT | Performed by: FAMILY MEDICINE

## 2025-01-03 PROCEDURE — 3008F BODY MASS INDEX DOCD: CPT | Performed by: FAMILY MEDICINE

## 2025-01-03 RX ORDER — AZITHROMYCIN 500 MG/1
500 TABLET, FILM COATED ORAL DAILY
Qty: 5 TABLET | Refills: 0 | Status: SHIPPED | OUTPATIENT
Start: 2025-01-03 | End: 2025-01-08

## 2025-01-03 RX ORDER — CODEINE PHOSPHATE AND GUAIFENESIN 10; 100 MG/5ML; MG/5ML
10 SOLUTION ORAL EVERY 6 HOURS PRN
Qty: 200 ML | Refills: 0 | Status: SHIPPED | OUTPATIENT
Start: 2025-01-03 | End: 2025-01-08

## 2025-01-03 RX ORDER — ONDANSETRON 4 MG/1
4 TABLET, FILM COATED ORAL EVERY 8 HOURS PRN
Qty: 30 TABLET | Refills: 5 | Status: SHIPPED | OUTPATIENT
Start: 2025-01-03

## 2025-01-03 RX ORDER — ONDANSETRON 4 MG/1
4 TABLET, ORALLY DISINTEGRATING ORAL EVERY 12 HOURS PRN
COMMUNITY
Start: 2024-12-12 | End: 2025-01-03 | Stop reason: SDUPTHER

## 2025-01-03 ASSESSMENT — PATIENT HEALTH QUESTIONNAIRE - PHQ9
2. FEELING DOWN, DEPRESSED OR HOPELESS: NOT AT ALL
SUM OF ALL RESPONSES TO PHQ9 QUESTIONS 1 AND 2: 0
1. LITTLE INTEREST OR PLEASURE IN DOING THINGS: NOT AT ALL

## 2025-01-03 ASSESSMENT — ENCOUNTER SYMPTOMS
ENDOCRINE NEGATIVE: 1
DIZZINESS: 0
DIFFICULTY URINATING: 0
NAUSEA: 0
FEVER: 0
SHORTNESS OF BREATH: 1
COUGH: 1
FREQUENCY: 1
DIARRHEA: 0

## 2025-01-03 ASSESSMENT — PAIN SCALES - GENERAL: PAINLEVEL_OUTOF10: 6

## 2025-01-03 NOTE — PROGRESS NOTES
"Subjective   Patient ID: Angeline Gurrola is a 44 y.o. female who presents for Follow-up ([facility]-Dx walking pneumonia).    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        Also see HPI   HENT:  Positive for congestion.    Eyes:  Negative for visual disturbance.   Respiratory:  Positive for cough and shortness of breath.    Cardiovascular:  Negative for chest pain.   Gastrointestinal:  Negative for diarrhea and nausea.   Endocrine: Negative.    Genitourinary:  Positive for frequency. Negative for difficulty urinating.   Skin:  Negative for rash.   Neurological:  Negative for dizziness.        No focal deficits   Psychiatric/Behavioral:  Negative for suicidal ideas.    All other systems reviewed and are negative.      Objective   /70   Pulse 62   Temp 36.7 °C (98.1 °F)   Ht 1.727 m (5' 8\")   Wt (!) 185 kg (408 lb)   LMP 12/07/2024 (Approximate)   SpO2 99%   BMI 62.04 kg/m²     Physical Exam  Vitals and nursing note reviewed.   Constitutional:       Appearance: Normal appearance.   HENT:      Head: Normocephalic and atraumatic.   Eyes:      Conjunctiva/sclera: Conjunctivae normal.   Cardiovascular:      Rate and Rhythm: Normal rate and regular rhythm.      Heart sounds: Normal heart sounds.   Pulmonary:      Effort: Pulmonary effort is normal.      Breath sounds: Normal breath sounds.   Neurological:      Mental Status: She is oriented to person, place, and time.   Psychiatric:         Mood and Affect: Mood normal.         Behavior: Behavior normal.         Assessment/Plan   Diagnoses and all orders for this visit:  Bronchitis  -     azithromycin (Zithromax) 500 mg tablet; Take 1 tablet (500 mg) by mouth once daily for 5 days.  -     " codeine-guaifenesin (Robitussin-AC)  mg/5 mL syrup; Take 10 mL by mouth every 6 hours if needed for cough for up to 5 days.  Nausea  -     ondansetron (Zofran) 4 mg tablet; Take 1 tablet (4 mg) by mouth every 8 hours if needed for nausea or vomiting.  Acute cough  -     XR chest 2 views; Future  Exertional dyspnea  -     XR chest 2 views; Future  Frequent urination  -     Urinalysis with Reflex Culture and Microscopic; Future

## 2025-01-07 ENCOUNTER — APPOINTMENT (OUTPATIENT)
Dept: RHEUMATOLOGY | Facility: CLINIC | Age: 45
End: 2025-01-07
Payer: COMMERCIAL

## 2025-01-07 DIAGNOSIS — M45.9 ANKYLOSING SPONDYLITIS, UNSPECIFIED SITE OF SPINE (MULTI): Primary | ICD-10-CM

## 2025-01-07 DIAGNOSIS — Z79.899 ENCOUNTER FOR LONG-TERM (CURRENT) USE OF MEDICATIONS: ICD-10-CM

## 2025-01-07 PROCEDURE — 99212 OFFICE O/P EST SF 10 MIN: CPT | Performed by: INTERNAL MEDICINE

## 2025-01-07 ASSESSMENT — ENCOUNTER SYMPTOMS
BACK PAIN: 1
FATIGUE: 1

## 2025-01-07 NOTE — PROGRESS NOTES
"Subjective Patient consented to this video visit.  Identified through her date of birth.  No one else is in the room with her today.  Patient ID: Angeline Gurrola is a 44 y.o. female who presents for Follow-up (5 month follow up ).  HPI  Patient has history of ankylosing spondylitis with +[id number].      Patient was last seen In August and at that time she had some improvement with Cosentyx infusions but was having some weakness in her left lower extremity.  We did get a new x-ray and wanted her to do physical therapy.  X-ray did not show any abnormality.  She had also seen Dr. Jeffers which showed advanced osteoarthritis of the bilateral knees.    She has had \"walking pneumonia\".  She also had cellulitis of the right leg and had been working with Dr. Winters as she has had a history of C. difficile with antibiotics.  She had an IV infusion and then had vancomycin.  It has now fully resolved.    After she had the IV infusion of antibiotics, she noticed foam in her urine and also protein.  This post to have a repeat urinalysis.    She does feel that the Cosentyx infusions have been helping.  She feels that overall she does not have as much joint aches or lower back pain like she did previously.  Still having weakness in her left leg but not as bad as it was previously.  Her right hip have been okay but had advanced osteoarthritis of the knees.    Review of Systems   Constitutional:  Positive for fatigue.   HENT:  Positive for congestion and postnasal drip.    Cardiovascular:  Positive for leg swelling.   Musculoskeletal:  Positive for back pain and gait problem.       Objective   Physical Exam  Unable to physically evaluate appears tired able to speak in full sentences  GEN: NAD A&O x3 appropriate affect    Assessment/Plan     Positive HLA-B27 ankylosing spondylitis previously has been on methotrexate, Humira, infliximab,  Taltz   -Has had benefit with IV Cosentyx and that she has had decrease in aches, stiffness and low " back pain.  Will have her continue with this line of treatment.  Currently has positive infection and had also cellulitis of the right thigh.    Reviewed her recent blood work    Will have her come back in 6 months or as needed     Spent 10 minutes with at least 50% with patient.    Mimi Raya MD 01/07/25 1:06 PM

## 2025-01-08 ENCOUNTER — PATIENT MESSAGE (OUTPATIENT)
Dept: RHEUMATOLOGY | Facility: CLINIC | Age: 45
End: 2025-01-08
Payer: COMMERCIAL

## 2025-01-08 ENCOUNTER — HOSPITAL ENCOUNTER (OUTPATIENT)
Dept: RADIOLOGY | Facility: CLINIC | Age: 45
Discharge: HOME | End: 2025-01-08
Payer: COMMERCIAL

## 2025-01-08 DIAGNOSIS — R06.09 EXERTIONAL DYSPNEA: ICD-10-CM

## 2025-01-08 DIAGNOSIS — R05.1 ACUTE COUGH: ICD-10-CM

## 2025-01-08 PROCEDURE — 71046 X-RAY EXAM CHEST 2 VIEWS: CPT

## 2025-01-08 PROCEDURE — 71046 X-RAY EXAM CHEST 2 VIEWS: CPT | Performed by: RADIOLOGY

## 2025-01-13 ENCOUNTER — APPOINTMENT (OUTPATIENT)
Dept: INFUSION THERAPY | Facility: CLINIC | Age: 45
End: 2025-01-13
Payer: COMMERCIAL

## 2025-01-27 ENCOUNTER — APPOINTMENT (OUTPATIENT)
Dept: INFUSION THERAPY | Facility: CLINIC | Age: 45
End: 2025-01-27
Payer: COMMERCIAL

## 2025-01-27 VITALS
DIASTOLIC BLOOD PRESSURE: 59 MMHG | HEART RATE: 53 BPM | SYSTOLIC BLOOD PRESSURE: 109 MMHG | BODY MASS INDEX: 62.43 KG/M2 | OXYGEN SATURATION: 100 % | TEMPERATURE: 97.8 F | WEIGHT: 293 LBS | RESPIRATION RATE: 18 BRPM

## 2025-01-27 DIAGNOSIS — M45.9 ANKYLOSING SPONDYLITIS, UNSPECIFIED SITE OF SPINE (MULTI): ICD-10-CM

## 2025-01-27 PROCEDURE — 96413 CHEMO IV INFUSION 1 HR: CPT | Performed by: NURSE PRACTITIONER

## 2025-01-27 RX ORDER — ALBUTEROL SULFATE 0.83 MG/ML
3 SOLUTION RESPIRATORY (INHALATION) AS NEEDED
OUTPATIENT
Start: 2025-02-10

## 2025-01-27 RX ORDER — FAMOTIDINE 10 MG/ML
20 INJECTION INTRAVENOUS ONCE AS NEEDED
OUTPATIENT
Start: 2025-02-10

## 2025-01-27 RX ORDER — EPINEPHRINE 0.3 MG/.3ML
0.3 INJECTION SUBCUTANEOUS EVERY 5 MIN PRN
OUTPATIENT
Start: 2025-02-10

## 2025-01-27 RX ORDER — DIPHENHYDRAMINE HYDROCHLORIDE 50 MG/ML
50 INJECTION INTRAMUSCULAR; INTRAVENOUS AS NEEDED
OUTPATIENT
Start: 2025-02-10

## 2025-01-27 ASSESSMENT — ENCOUNTER SYMPTOMS
HEMATURIA: 0
LEG SWELLING: 0
SORE THROAT: 0
DYSURIA: 0
HEADACHES: 0
WOUND: 0
COUGH: 0
APPETITE CHANGE: 0
NAUSEA: 0
UNEXPECTED WEIGHT CHANGE: 0
ARTHRALGIAS: 1
EYE PROBLEMS: 0
FATIGUE: 0
CONSTIPATION: 0
DIARRHEA: 0
FREQUENCY: 0
NUMBNESS: 0
ABDOMINAL PAIN: 0
TROUBLE SWALLOWING: 0
DIZZINESS: 0
BLOOD IN STOOL: 0
WHEEZING: 0
SHORTNESS OF BREATH: 0
VOICE CHANGE: 0
MYALGIAS: 0
LIGHT-HEADEDNESS: 0
VOMITING: 0
EXTREMITY WEAKNESS: 0
CHILLS: 0
PALPITATIONS: 0
FEVER: 0

## 2025-01-27 ASSESSMENT — PAIN SCALES - GENERAL: PAINLEVEL_OUTOF10: 7

## 2025-01-27 NOTE — PROGRESS NOTES
[facility]   Infusion Clinic Note   Date: 2025   Name: Angeline Gurrola  : 1980   MRN: [id number]          Reason for Visit: OP Infusion (Cosentyx 300 mg every 28 days)         Today: We administered secukinumab (Cosentyx) 300 mg in sodium chloride 0.9% 100 mL IVPB.       Visit Type: INFUSION       Ordered By: Mimi Raya MD        Accompanied by: Self       Diagnosis: Ankylosing spondylitis, unspecified site of spine (Multi)        Allergies:   Allergies as of 2025 - Reviewed 2025   Allergen Reaction Noted    Infliximab Anaphylaxis 07/15/2021    Zolmitriptan Palpitations and Other 2013    Penicillins Hives, Swelling, and Rash 2013    Sulfa (sulfonamide antibiotics) Hives and Swelling 2013    Cephalexin GI Upset, Nausea/vomiting, and Unknown 2013    Doxycycline GI Upset 2022          Current Medications: has a current medication list which includes the following prescription(s): albuterol, alprazolam, azelastine, chlordiazepoxide-clidinium, creon, cyanocobalamin, ergocalciferol, famotidine, fluticasone, furosemide, hyoscyamine, metoclopramide, nebivolol, nortriptyline, olmesartan-hydrochlorothiazide, ondansetron, secukinumab, euflexxa, venlafaxine xr, and venlafaxine xr.       Vitals:   Vitals:    25 1302 25 1435   BP: 115/66 109/59   Pulse: 63 53   Resp: 18 18   Temp: 36.6 °C (97.9 °F) 36.6 °C (97.8 °F)   SpO2: 97% 100%   Weight: (!) 186 kg (410 lb 9.6 oz)    PainSc:   7    PainLoc: Knee  Comment: back    LMP: 01/10/2025             Infusion Pre-procedure Checklist:   - Allergies & Medications reviewed: yes       - Previous reaction to current treatment: no      Assess patient for the concerns below. Document provider notification as appropriate.  - Active or recent infection with/without current antibiotic use: no  - Recent or planned invasive dental work: no  - Recent or planned surgeries: no  - Recently  received or plans to receive vaccinations: no  - Has treatment related toxicities: no  - Any chance you may be pregnant:  no      Pain: 7   - Is the pain different from normal: no   - Is your Doctor aware:  yes       Labs:  reviewed          Fall Risk Screening: Kraus Fall Risk  History of Falling, Immediate or Within 3 Months: No  Secondary Diagnosis: Yes  Ambulatory Aid: Walks without aid/bedrest/nurse assist  Intravenous Therapy/Heparin Lock: Yes  Gait/Transferring: Normal/bedrest/immobile  Mental Status: Oriented to own ability  Kraus Fall Risk Score: 35       Review Of Systems:  Review of Systems   Constitutional:  Negative for appetite change, chills, fatigue, fever and unexpected weight change.   HENT:   Negative for hearing loss, mouth sores, sore throat, tinnitus, trouble swallowing and voice change.    Eyes:  Negative for eye problems.   Respiratory:  Negative for cough, shortness of breath and wheezing.    Cardiovascular:  Negative for chest pain, leg swelling and palpitations.   Gastrointestinal:  Negative for abdominal pain, blood in stool, constipation, diarrhea, nausea and vomiting.   Genitourinary:  Negative for dysuria, frequency and hematuria.    Musculoskeletal:  Positive for arthralgias. Negative for myalgias.   Skin:  Negative for itching, rash and wound.   Neurological:  Negative for dizziness, extremity weakness, headaches, light-headedness and numbness.        History of migraines         ROS completed? Yes      Infusion Readiness:  - Assessment Concerns Related to Infusion: No  - Provider notified: n/a      Document Below Only If Indicated:   New Patient Education:    N/A (returning patient for continuation of therapy. Ongoing education provided as needed.)        Treatment Conditions & Drug Specific Questions:    Secukinumab (COSENTYX)    (Unless otherwise specified on patient specific therapy plan):     TREATMENT CONDITIONS:  Unless otherwise specified on patient specific therapy plan HOLD  "and notify Provider prior to proceeding with today's infusion if patient with:  o Positive T-Spot  o Reactive Hep B and Hep C virus antibody  o Positive pregnancy prior to first treatment in women of childbearing ability    Lab Results   Component Value Date    TBSIN Negative 03/06/2024    QFG NEGATIVE 05/23/2018      Lab Results   Component Value Date    HEPBSAG Nonreactive 03/28/2024      No results found for: \"NONUHFIRE\", \"NONUHSWGH\", \"NONUHFISH\", \"EXTHEPBSAG\"  No results found for: \"HBCTI\", \"HEPBCAB\"  Lab Results   Component Value Date    HEPCAB Nonreactive 03/28/2024      Lab Results   Component Value Date    HEPCAB Nonreactive 03/28/2024       If available for review: CBC-D; CMP; CRP; LFT  Lab Results   Component Value Date    WBC 9.0 12/12/2024    HGB 12.7 12/12/2024    HCT 41.8 12/12/2024    MCV 94 12/12/2024     12/12/2024        Chemistry    Lab Results   Component Value Date/Time     12/12/2024 1301    K 4.3 12/12/2024 1301    CL 98 12/12/2024 1301    CO2 32 12/12/2024 1301    BUN 18 12/12/2024 1301    CREATININE 0.91 12/12/2024 1301    Lab Results   Component Value Date/Time    CALCIUM 9.3 12/12/2024 1301    ALKPHOS 121 (H) 12/12/2024 1301    AST 16 12/12/2024 1301    ALT 18 12/12/2024 1301    BILITOT 0.5 12/12/2024 1301           Lab Results   Component Value Date    CRP 1.81 (H) 03/06/2024      Lab Results   Component Value Date    ALT 18 12/12/2024    AST 16 12/12/2024    ALKPHOS 121 (H) 12/12/2024    BILITOT 0.5 12/12/2024        Labs reviewed and patient meets treatment conditions? Yes    DRUG SPECIFIC QUESTIONS:  Up to date on all immunizations per patient report? Yes    Do you have a history of irritable bowel disease? No          (If yes educate patient that treatment may cause exacerbations and/or new onset of inflammatory bowel of inflammatory bowel disease)    Any new or recent diagnosis of cancer, specifically skin cancer?No          (If yes notify provider prior to " proceeding)    -   Any new or worsening rashes / lesions? No  ( If YES notify provider piror to proceeding)    REMINDER:   WEIGHT BASED DRUG   PREGNANCY TEST PRIOR TO FIRST INFUSION FOR WOMEN OF CHILDBEARING ABILITY.    Recommended Vitals/Observation:  Vitals: Obtain vital signs at start and end of infusion, and as needed.  Observation: No observation.         Weight Based Drug Calculations:    WEIGHT BASED DRUGS: NOT APPLICABLE / FLAT DOSE          Initiated By: Kameron Dean, RN     1425 Line flushed with NS 50 ml when infusion bag completed.

## 2025-01-27 NOTE — PATIENT INSTRUCTIONS
Today :We administered secukinumab (Cosentyx) 300 mg in sodium chloride 0.9% 100 mL IVPB.     For:   1. Ankylosing spondylitis, unspecified site of spine (Multi)         Your next appointment is due in:  28 days        Please read the  Medication Guide that was given to you and reviewed during todays visit.     (Tell all doctors including dentists that you are taking this medication)     Go to the emergency room or call 911 if:  -You have signs of allergic reaction:   -Rash, hives, itching.   -Swollen, blistered, peeling skin.   -Swelling of face, lips, mouth, tongue or throat.   -Tightness of chest, trouble breathing, swallowing or talking     Call your doctor:  - If IV / injection site gets red, warm, swollen, itchy or leaks fluid or pus.     (Leave dressing on your IV site for at least 2 hours and keep area clean and dry  - If you get sick or have symptoms of infection or are not feeling well for any reason.    (Wash your hands often, stay away from people who are sick)  - If you have side effects from your medication that do not go away or are bothersome.     (Refer to the teaching your nurse gave you for side effects to call your doctor about)    - Common side effects may include:  stuffy nose, headache, feeling tired, muscle aches, upset stomach  - Before receiving any vaccines     - [facility] at   If:  - You get sick, are on antibiotics, have had a recent vaccine, have surgery or dental work and your doctor wants your visit rescheduled.  - You need to cancel and reschedule your visit for any reason. Call at least 2 days before your visit if you need to cancel.   - Your insurance changes before your next visit.    (We will need to get approval from your new insurance. This can take up to two weeks.)     [facility] is opened Monday thru Friday. We are closed on weekends and holidays.   Voice mail will take your call if the center is closed. If you leave a message  please allow 24 hours for a call back during weekdays. If you leave a message on a weekend/holiday, we will call you back the next business day.    A pharmacist is available Monday - Friday from 8:30AM to 3:30PM to help answer any questions you may have about your prescriptions(s). Please call pharmacy at:    [facility]: [phone]  [facility]: [phone]  [facility]: [phone]

## 2025-02-10 ENCOUNTER — APPOINTMENT (OUTPATIENT)
Dept: PRIMARY CARE | Facility: CLINIC | Age: 45
End: 2025-02-10
Payer: COMMERCIAL

## 2025-02-14 ENCOUNTER — OFFICE VISIT (OUTPATIENT)
Dept: PRIMARY CARE | Facility: CLINIC | Age: 45
End: 2025-02-14
Payer: COMMERCIAL

## 2025-02-14 VITALS
SYSTOLIC BLOOD PRESSURE: 114 MMHG | HEART RATE: 72 BPM | HEIGHT: 68 IN | DIASTOLIC BLOOD PRESSURE: 64 MMHG | TEMPERATURE: 97.9 F | WEIGHT: 293 LBS | BODY MASS INDEX: 44.41 KG/M2

## 2025-02-14 DIAGNOSIS — F41.9 ANXIETY: Primary | ICD-10-CM

## 2025-02-14 DIAGNOSIS — F51.01 PRIMARY INSOMNIA: ICD-10-CM

## 2025-02-14 DIAGNOSIS — Z79.899 MEDICATION MANAGEMENT: ICD-10-CM

## 2025-02-14 PROCEDURE — 3008F BODY MASS INDEX DOCD: CPT | Performed by: FAMILY MEDICINE

## 2025-02-14 PROCEDURE — 99214 OFFICE O/P EST MOD 30 MIN: CPT | Performed by: FAMILY MEDICINE

## 2025-02-14 RX ORDER — ALPRAZOLAM 1 MG/1
1 TABLET ORAL 2 TIMES DAILY PRN
Qty: 30 TABLET | Refills: 2 | Status: SHIPPED | OUTPATIENT
Start: 2025-02-14

## 2025-02-14 ASSESSMENT — ENCOUNTER SYMPTOMS
ENDOCRINE NEGATIVE: 1
SHORTNESS OF BREATH: 0
DIFFICULTY URINATING: 0
DIARRHEA: 0
DEPRESSION: 0
DIZZINESS: 0
LOSS OF SENSATION IN FEET: 0
FEVER: 0
NAUSEA: 0
OCCASIONAL FEELINGS OF UNSTEADINESS: 1

## 2025-02-14 ASSESSMENT — PATIENT HEALTH QUESTIONNAIRE - PHQ9
2. FEELING DOWN, DEPRESSED OR HOPELESS: NOT AT ALL
1. LITTLE INTEREST OR PLEASURE IN DOING THINGS: NOT AT ALL
SUM OF ALL RESPONSES TO PHQ9 QUESTIONS 1 AND 2: 0

## 2025-02-14 ASSESSMENT — PAIN SCALES - GENERAL: PAINLEVEL_OUTOF10: 7

## 2025-02-14 NOTE — PROGRESS NOTES
"Subjective   Patient ID: Angeline Gurrola is a 45 y.o. female who presents for Follow-up (leg pain/anxiety).    Last clinic visit summary (1/3/2025)  -Patient with upper respiratory symptoms, cough, chest congestion etc. Recently diagnosed with walking pneumonia.  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  -Urination-frequent, contaminated urine recently.    HPI   The pt presents to the clinic for a follow-up. Past medical hx of anxiety and lymphedema.    -Depression/Anxiety/Insomnia: Fairly controlled but poor sleep recently. No suicidal thoughts/ideals.  Continues on Xanax 1 mg on PRN basis. Pt states that she usually takes this medication once or twice daily. Doing well on this medication and no side-effects reported. [place] reviewed (ok). Pt will complete urine drug screen later today. Refilled Xanax prescription. Pt received prescription for suvorexant medication to use on PRN basis to improve sleep.    Review of Systems   Constitutional:  Negative for fever.        Also see HPI   Eyes:  Negative for visual disturbance.   Respiratory:  Negative for shortness of breath.    Cardiovascular:  Negative for chest pain.   Gastrointestinal:  Negative for diarrhea and nausea.   Endocrine: Negative.    Genitourinary:  Negative for difficulty urinating.   Skin:  Negative for rash.   Neurological:  Negative for dizziness.        No focal deficits   Psychiatric/Behavioral:  Negative for suicidal ideas.    All other systems reviewed and are negative.      Objective   /64   Pulse 72   Temp 36.6 °C (97.9 °F)   Ht 1.727 m (5' 8\")   Wt (!) 186 kg (411 lb)   LMP 02/07/2025 (Approximate)   BMI 62.49 kg/m²     Physical Exam  Vitals and nursing note reviewed.   Constitutional:       Appearance: Normal appearance.   HENT:      Head: Normocephalic and atraumatic.   Eyes:      Conjunctiva/sclera: Conjunctivae normal.   Cardiovascular:      Rate and Rhythm: Normal rate and regular rhythm.     "  Heart sounds: Normal heart sounds.   Pulmonary:      Effort: Pulmonary effort is normal.      Breath sounds: Normal breath sounds.   Neurological:      Mental Status: She is oriented to person, place, and time.   Psychiatric:         Mood and Affect: Mood normal.         Behavior: Behavior normal.         Assessment/Plan   Diagnoses and all orders for this visit:  Medication management  -     Drug Screen, Urine With Reflex to Confirmation; Future  Anxiety  -     Follow Up In Primary Care - Established  -     ALPRAZolam (Xanax) 1 mg tablet; Take 1 tablet (1 mg) by mouth 2 times a day as needed for anxiety.  -     Drug Screen, Urine With Reflex to Confirmation; Future  Primary insomnia  -     suvorexant (Belsomra) 10 mg tablet; Take 1 tablet (10 mg) by mouth as needed at bedtime for sleep for up to 10 days.         Scribe Attestation  By signing my name below, IYusef , Scrjuancho   attest that this documentation has been prepared under the direction and in the presence of Richard Dailey DO.

## 2025-02-17 DIAGNOSIS — M45.9 ANKYLOSING SPONDYLITIS, UNSPECIFIED SITE OF SPINE (MULTI): Primary | ICD-10-CM

## 2025-02-17 NOTE — PROGRESS NOTES
CHF teaching started post introduction to pt.; aware of diagnosis. Planner/scale @ BS and will follow. Smoking/ ETOH/Illicit drug use cessation covered. Pt/family aware that I cannot prescribe nor adjust medications: 15mins Palliative Care score:  ACP on file Start 2 Liter Fluid Restriction/ Cardiac diet CHF teaching continues to pt. . Emphasis on taking prescription meds as ordered, to keep F/U appointments and  maintain healthy weight , to call MD STAT if any of the following occur: ? If you gain 2 lbs in one day or 5 lbs in a week, and short of breath. ? If you cannot breathe, are short of breath or rapid breathing at rest. Develop a cough or wheezing. ? If you notice swollen hands/feet/ankles or stomach with a bloated/ full feeling. ? If you become confused or mentally fuzzy or dizzy. ? If you notice a rapid or change in your heart rate. ? If you become more exhausted all the time and unable to do the same level of activity without stopping to catch your breath. Drink no more than 8 cups a day in 8 oz. cups. Your Heart can not handle any more. Stay away from salt (limit anything with salt or sodium in it). Limit to 250mg per serving. Pt. verbalizes understanding, will follow to reinforce teaching skills: 20 mins Renewal of cosentyx therapy plan for continuation of therapy per Dr. Raya Jan 2025 fuv.

## 2025-02-24 ENCOUNTER — APPOINTMENT (OUTPATIENT)
Dept: INFUSION THERAPY | Facility: CLINIC | Age: 45
End: 2025-02-24
Payer: COMMERCIAL

## 2025-02-24 VITALS
BODY MASS INDEX: 62.14 KG/M2 | OXYGEN SATURATION: 100 % | RESPIRATION RATE: 18 BRPM | HEART RATE: 57 BPM | WEIGHT: 293 LBS | SYSTOLIC BLOOD PRESSURE: 110 MMHG | TEMPERATURE: 97.3 F | DIASTOLIC BLOOD PRESSURE: 55 MMHG

## 2025-02-24 DIAGNOSIS — M45.9 ANKYLOSING SPONDYLITIS, UNSPECIFIED SITE OF SPINE (MULTI): ICD-10-CM

## 2025-02-24 LAB
1OH-MIDAZOLAM UR-MCNC: NEGATIVE NG/ML
7AMINOCLONAZEPAM UR-MCNC: NEGATIVE NG/ML
A-OH ALPRAZ UR-MCNC: 189 NG/ML
A-OH-TRIAZOLAM UR-MCNC: NEGATIVE NG/ML
AMPHETAMINES UR QL: NEGATIVE NG/ML
BARBITURATES UR QL: NEGATIVE NG/ML
BENZODIAZ UR QL: POSITIVE NG/ML
BZE UR QL: NEGATIVE NG/ML
CREAT UR-MCNC: 109.5 MG/DL
DRUG SCREEN COMMENT UR-IMP: ABNORMAL
LORAZEPAM UR-MCNC: NEGATIVE NG/ML
METHADONE UR QL: NEGATIVE NG/ML
NORDIAZEPAM UR-MCNC: NEGATIVE NG/ML
OH-ETHYLFLURAZ UR-MCNC: NEGATIVE NG/ML
OPIATES UR QL: NEGATIVE NG/ML
OXAZEPAM UR-MCNC: NEGATIVE NG/ML
OXIDANTS UR QL: NEGATIVE MCG/ML
OXYCODONE UR QL: NEGATIVE NG/ML
PCP UR QL: NEGATIVE NG/ML
PH UR: 7 [PH] (ref 4.5–9)
QUEST NOTES AND COMMENTS: ABNORMAL
TEMAZEPAM UR-MCNC: NEGATIVE NG/ML
THC UR QL: NEGATIVE NG/ML

## 2025-02-24 PROCEDURE — 96365 THER/PROPH/DIAG IV INF INIT: CPT | Performed by: NURSE PRACTITIONER

## 2025-02-24 RX ORDER — EPINEPHRINE 0.3 MG/.3ML
0.3 INJECTION SUBCUTANEOUS EVERY 5 MIN PRN
OUTPATIENT
Start: 2025-03-12

## 2025-02-24 RX ORDER — DIPHENHYDRAMINE HYDROCHLORIDE 50 MG/ML
50 INJECTION INTRAMUSCULAR; INTRAVENOUS AS NEEDED
OUTPATIENT
Start: 2025-03-12

## 2025-02-24 RX ORDER — ALBUTEROL SULFATE 0.83 MG/ML
3 SOLUTION RESPIRATORY (INHALATION) AS NEEDED
OUTPATIENT
Start: 2025-03-12

## 2025-02-24 RX ORDER — FAMOTIDINE 10 MG/ML
20 INJECTION, SOLUTION INTRAVENOUS ONCE AS NEEDED
OUTPATIENT
Start: 2025-03-12

## 2025-02-24 ASSESSMENT — ENCOUNTER SYMPTOMS
HEMATURIA: 0
NUMBNESS: 0
CONSTIPATION: 0
COUGH: 0
UNEXPECTED WEIGHT CHANGE: 0
VOICE CHANGE: 0
LIGHT-HEADEDNESS: 0
VOMITING: 0
CHILLS: 0
APPETITE CHANGE: 0
HEADACHES: 0
FEVER: 0
DIZZINESS: 0
WOUND: 1
NAUSEA: 0
ARTHRALGIAS: 0
EXTREMITY WEAKNESS: 0
DYSURIA: 0
FREQUENCY: 0
TROUBLE SWALLOWING: 0
PALPITATIONS: 0
EYE PROBLEMS: 0
ABDOMINAL PAIN: 0
SORE THROAT: 0
BLOOD IN STOOL: 0
SHORTNESS OF BREATH: 0
LEG SWELLING: 0
MYALGIAS: 0
DIARRHEA: 0
FATIGUE: 1
WHEEZING: 0

## 2025-02-24 ASSESSMENT — PAIN SCALES - GENERAL: PAINLEVEL_OUTOF10: 4

## 2025-02-24 NOTE — PATIENT INSTRUCTIONS
Today :We administered secukinumab (Cosentyx) 300 mg in sodium chloride 0.9% 100 mL IVPB.     For:   1. Ankylosing spondylitis, unspecified site of spine (Multi)         Your next appointment is due in:  28 days        Please read the  Medication Guide that was given to you and reviewed during todays visit.     (Tell all doctors including dentists that you are taking this medication)     Go to the emergency room or call 911 if:  -You have signs of allergic reaction:   -Rash, hives, itching.   -Swollen, blistered, peeling skin.   -Swelling of face, lips, mouth, tongue or throat.   -Tightness of chest, trouble breathing, swallowing or talking     Call your doctor:  - If IV / injection site gets red, warm, swollen, itchy or leaks fluid or pus.     (Leave dressing on your IV site for at least 2 hours and keep area clean and dry  - If you get sick or have symptoms of infection or are not feeling well for any reason.    (Wash your hands often, stay away from people who are sick)  - If you have side effects from your medication that do not go away or are bothersome.     (Refer to the teaching your nurse gave you for side effects to call your doctor about)    - Common side effects may include:  stuffy nose, headache, feeling tired, muscle aches, upset stomach  - Before receiving any vaccines     - Call [facility] at   If:  - You get sick, are on antibiotics, have had a recent vaccine, have surgery or dental work and your doctor wants your visit rescheduled.  - You need to cancel and reschedule your visit for any reason. Call at least 2 days before your visit if you need to cancel.   - Your insurance changes before your next visit.    (We will need to get approval from your new insurance. This can take up to two weeks.)     [facility] is opened Monday thru Friday. We are closed on weekends and holidays.   Voice mail will take your call if the center is closed. If you leave a message  please allow 24 hours for a call back during weekdays. If you leave a message on a weekend/holiday, we will call you back the next business day.    A pharmacist is available Monday - Friday from 8:30AM to 3:30PM to help answer any questions you may have about your prescriptions(s). Please call pharmacy at:    [facility]: [phone]  [facility]: [phone]  [facility]: [phone]

## 2025-02-24 NOTE — PROGRESS NOTES
[facility]   Infusion Clinic Note   Date: 2025   Name: Angeline Gurrola  : 1980   MRN: [id number]         Reason for Visit: OP Infusion (Cosentyx every 28 days)         Today: We administered secukinumab (Cosentyx) 300 mg in sodium chloride 0.9% 100 mL IVPB.       Ordered By: Mimi Raya MD       For a Diagnosis of: Ankylosing spondylitis, unspecified site of spine (Multi)       At today's visit patient accompanied by: Self      Vitals:   Vitals:    25 1300 25 1431   BP: (!) 107/47  Comment: nurse notified 110/55   Pulse: 64 57   Resp: 18 18   Temp: 36.8 °C (98.3 °F) 36.3 °C (97.3 °F)   SpO2: 99% 100%   Weight: (!) 185 kg (408 lb 11.2 oz)    PainSc:   4    PainLoc: Knee    LMP: 2025             Pre - Treatment Checklist:      - Previous reaction to current treatment: no      Assess patient for the concerns below. Document provider notification as appropriate.  - Active or recent infection with/without current antibiotic use: no  - Recent or planned invasive dental work: no  - Recent or planned surgeries: no  - Recently received or plans to receive vaccinations: no  - Has treatment related toxicities: no  - Any chance may be pregnant:  no      Pain: 4   - Is the pain different from normal: no   - Is prescribing Doctor aware:  n/a      Labs: Reviewed       Fall Risk Screening: Kraus Fall Risk  History of Falling, Immediate or Within 3 Months: Yes  Secondary Diagnosis: Yes  Ambulatory Aid: Walks without aid/bedrest/nurse assist  Intravenous Therapy/Heparin Lock: No  Gait/Transferring: Normal/bedrest/immobile  Mental Status: Oriented to own ability  Kraus Fall Risk Score: 40       Review Of Systems:  Review of Systems   Constitutional:  Positive for fatigue. Negative for appetite change, chills, fever and unexpected weight change.   HENT:   Negative for hearing loss, mouth sores, sore throat, tinnitus, trouble swallowing and voice change.    Eyes:   "Negative for eye problems.   Respiratory:  Negative for cough, shortness of breath and wheezing.    Cardiovascular:  Negative for chest pain, leg swelling and palpitations.   Gastrointestinal:  Negative for abdominal pain, blood in stool, constipation, diarrhea, nausea and vomiting.   Genitourinary:  Negative for dysuria, frequency and hematuria.    Musculoskeletal:  Negative for arthralgias and myalgias.   Skin:  Positive for wound. Negative for itching and rash.        Bilat lower leg wounds-healing well per pt   Neurological:  Negative for dizziness, extremity weakness, headaches, light-headedness and numbness.         Infusion Readiness:  - Assessment Concerns Related to Infusion: No  - Provider notified: n/a      New Patient Education:    N/A (returning patient for continuation of therapy. Ongoing education provided as needed.)        Treatment Conditions & Drug Specific Questions:    Secukinumab (COSENTYX)    (Unless otherwise specified on patient specific therapy plan):     TREATMENT CONDITIONS:  Unless otherwise specified on patient specific therapy plan HOLD and notify Provider prior to proceeding with today's infusion if patient with:  o Positive T-Spot  o Reactive Hep B and Hep C virus antibody  o Positive pregnancy prior to first treatment in women of childbearing ability    Lab Results   Component Value Date    TBSIN Negative 03/06/2024    QFG NEGATIVE 05/23/2018      Lab Results   Component Value Date    HEPBSAG Nonreactive 03/28/2024      No results found for: \"NONUHFIRE\", \"NONUHSWGH\", \"NONUHFISH\", \"EXTHEPBSAG\"  No results found for: \"HBCTI\", \"HEPBCAB\"  Lab Results   Component Value Date    HEPCAB Nonreactive 03/28/2024      Lab Results   Component Value Date    HEPCAB Nonreactive 03/28/2024       If available for review: CBC-D; CMP; CRP; LFT  Lab Results   Component Value Date    WBC 9.0 12/12/2024    HGB 12.7 12/12/2024    HCT 41.8 12/12/2024    MCV 94 12/12/2024     12/12/2024        " Chemistry    Lab Results   Component Value Date/Time     12/12/2024 1301    K 4.3 12/12/2024 1301    CL 98 12/12/2024 1301    CO2 32 12/12/2024 1301    BUN 18 12/12/2024 1301    CREATININE 0.91 12/12/2024 1301    Lab Results   Component Value Date/Time    CALCIUM 9.3 12/12/2024 1301    ALKPHOS 121 (H) 12/12/2024 1301    AST 16 12/12/2024 1301    ALT 18 12/12/2024 1301    BILITOT 0.5 12/12/2024 1301           Lab Results   Component Value Date    CRP 1.81 (H) 03/06/2024      Lab Results   Component Value Date    ALT 18 12/12/2024    AST 16 12/12/2024    ALKPHOS 121 (H) 12/12/2024    BILITOT 0.5 12/12/2024        Patient meets treatment conditions? Yes    DRUG SPECIFIC QUESTIONS:  Up to date on all immunizations per patient report? Yes    Do you have a history of irritable bowel disease? No          (If yes educate patient that treatment may cause exacerbations and/or new onset of inflammatory bowel of inflammatory bowel disease)    Any new or recent diagnosis of cancer, specifically skin cancer?No          (If yes notify provider prior to proceeding)    -   Any new or worsening rashes / lesions? No  ( If YES notify provider piror to proceeding)    REMINDER:   WEIGHT BASED DRUG   PREGNANCY TEST PRIOR TO FIRST INFUSION FOR WOMEN OF CHILDBEARING ABILITY.    Recommended Vitals/Observation:  Vitals: Obtain vital signs at start and end of infusion, and as needed.  Observation: No observation.         Weight Based Drug Calculations:    WEIGHT BASED DRUGS: NOT APPLICABLE / FLAT DOSE       Post Treatment: Patient tolerated treatment without issue and was discharged in no apparent distress.      Note Authored / Patient Cared for By: Jessica Johnson RN   _________________________________________________________________________    Note authored and patient cared for by: Jessica Johnson RN  Note/Encounter reviewed by: Jany Whetsel EDDI NP. This provider on site at time of patient infusion. Infusion staff to notify this  provider of any questions, concerns, abnormals or issues during infusion.    Final check of medication completed by this EDDI / or on-site pharmacist with administering nurse using positive identification prior to administration. Final appearance of product checked for accuracy and conformity to the formula of the prepared product. Assured use of correct ingredients, accurate calculations and precise measurements under appropriate conditions and procedures.    No issues reported during today's encounter. Pt. tolerated infusion without difficulty. Pt. not independently evaluated by this provider during today's encounter.  (Jany MONTAGUE NP)

## 2025-03-12 DIAGNOSIS — M45.9 ANKYLOSING SPONDYLITIS, UNSPECIFIED SITE OF SPINE (MULTI): ICD-10-CM

## 2025-03-24 ENCOUNTER — APPOINTMENT (OUTPATIENT)
Dept: INFUSION THERAPY | Facility: CLINIC | Age: 45
End: 2025-03-24
Payer: COMMERCIAL

## 2025-03-24 VITALS
TEMPERATURE: 97.9 F | SYSTOLIC BLOOD PRESSURE: 113 MMHG | RESPIRATION RATE: 18 BRPM | OXYGEN SATURATION: 100 % | HEART RATE: 62 BPM | DIASTOLIC BLOOD PRESSURE: 73 MMHG | WEIGHT: 293 LBS | BODY MASS INDEX: 62.07 KG/M2

## 2025-03-24 DIAGNOSIS — M45.9 ANKYLOSING SPONDYLITIS, UNSPECIFIED SITE OF SPINE (MULTI): ICD-10-CM

## 2025-03-24 LAB
ALBUMIN SERPL BCP-MCNC: 3.9 G/DL (ref 3.4–5)
ALP SERPL-CCNC: 94 U/L (ref 33–110)
ALT SERPL W P-5'-P-CCNC: 20 U/L (ref 7–45)
ANION GAP SERPL CALCULATED.3IONS-SCNC: 10 MMOL/L (ref 10–20)
AST SERPL W P-5'-P-CCNC: 21 U/L (ref 9–39)
BASOPHILS # BLD AUTO: 0.07 X10*3/UL (ref 0–0.1)
BASOPHILS NFR BLD AUTO: 0.8 %
BILIRUB SERPL-MCNC: 0.4 MG/DL (ref 0–1.2)
BUN SERPL-MCNC: 18 MG/DL (ref 6–23)
CALCIUM SERPL-MCNC: 9 MG/DL (ref 8.6–10.3)
CHLORIDE SERPL-SCNC: 102 MMOL/L (ref 98–107)
CO2 SERPL-SCNC: 28 MMOL/L (ref 21–32)
CREAT SERPL-MCNC: 0.86 MG/DL (ref 0.5–1.05)
EGFRCR SERPLBLD CKD-EPI 2021: 84 ML/MIN/1.73M*2
EOSINOPHIL # BLD AUTO: 0.36 X10*3/UL (ref 0–0.7)
EOSINOPHIL NFR BLD AUTO: 4.3 %
ERYTHROCYTE [DISTWIDTH] IN BLOOD BY AUTOMATED COUNT: 13.2 % (ref 11.5–14.5)
GLUCOSE SERPL-MCNC: 93 MG/DL (ref 74–99)
HCT VFR BLD AUTO: 40.2 % (ref 36–46)
HGB BLD-MCNC: 13.1 G/DL (ref 12–16)
IMM GRANULOCYTES # BLD AUTO: 0.02 X10*3/UL (ref 0–0.7)
IMM GRANULOCYTES NFR BLD AUTO: 0.2 % (ref 0–0.9)
LYMPHOCYTES # BLD AUTO: 2.04 X10*3/UL (ref 1.2–4.8)
LYMPHOCYTES NFR BLD AUTO: 24.4 %
MCH RBC QN AUTO: 30.4 PG (ref 26–34)
MCHC RBC AUTO-ENTMCNC: 32.6 G/DL (ref 32–36)
MCV RBC AUTO: 93 FL (ref 80–100)
MONOCYTES # BLD AUTO: 0.6 X10*3/UL (ref 0.1–1)
MONOCYTES NFR BLD AUTO: 7.2 %
NEUTROPHILS # BLD AUTO: 5.26 X10*3/UL (ref 1.2–7.7)
NEUTROPHILS NFR BLD AUTO: 63.1 %
NRBC BLD-RTO: 0 /100 WBCS (ref 0–0)
PLATELET # BLD AUTO: 323 X10*3/UL (ref 150–450)
POTASSIUM SERPL-SCNC: 4.2 MMOL/L (ref 3.5–5.3)
PROT SERPL-MCNC: 7.2 G/DL (ref 6.4–8.2)
RBC # BLD AUTO: 4.31 X10*6/UL (ref 4–5.2)
SODIUM SERPL-SCNC: 136 MMOL/L (ref 136–145)
WBC # BLD AUTO: 8.4 X10*3/UL (ref 4.4–11.3)

## 2025-03-24 PROCEDURE — 80053 COMPREHEN METABOLIC PANEL: CPT

## 2025-03-24 PROCEDURE — 96413 CHEMO IV INFUSION 1 HR: CPT

## 2025-03-24 PROCEDURE — 85025 COMPLETE CBC W/AUTO DIFF WBC: CPT

## 2025-03-24 RX ORDER — DIPHENHYDRAMINE HYDROCHLORIDE 50 MG/ML
50 INJECTION, SOLUTION INTRAMUSCULAR; INTRAVENOUS AS NEEDED
OUTPATIENT
Start: 2025-04-21

## 2025-03-24 RX ORDER — EPINEPHRINE 0.3 MG/.3ML
0.3 INJECTION SUBCUTANEOUS EVERY 5 MIN PRN
OUTPATIENT
Start: 2025-04-21

## 2025-03-24 RX ORDER — FAMOTIDINE 10 MG/ML
20 INJECTION, SOLUTION INTRAVENOUS ONCE AS NEEDED
OUTPATIENT
Start: 2025-04-21

## 2025-03-24 RX ORDER — ALBUTEROL SULFATE 0.83 MG/ML
3 SOLUTION RESPIRATORY (INHALATION) AS NEEDED
OUTPATIENT
Start: 2025-04-21

## 2025-03-24 ASSESSMENT — ENCOUNTER SYMPTOMS
LIGHT-HEADEDNESS: 0
SHORTNESS OF BREATH: 0
NAUSEA: 0
DIARRHEA: 0
ARTHRALGIAS: 1
HEMATURIA: 0
TROUBLE SWALLOWING: 0
CONSTIPATION: 0
BLOOD IN STOOL: 0
DYSURIA: 0
UNEXPECTED WEIGHT CHANGE: 0
FATIGUE: 0
WHEEZING: 0
FEVER: 0
HEADACHES: 0
SORE THROAT: 0
CHILLS: 0
PALPITATIONS: 0
ABDOMINAL PAIN: 0
DIZZINESS: 0
FREQUENCY: 0
VOMITING: 0
MYALGIAS: 1
LEG SWELLING: 0
BACK PAIN: 1
EXTREMITY WEAKNESS: 0
APPETITE CHANGE: 0
NUMBNESS: 0
VOICE CHANGE: 0
EYE PROBLEMS: 0
COUGH: 0
WOUND: 1

## 2025-03-24 ASSESSMENT — PAIN SCALES - GENERAL: PAINLEVEL_OUTOF10: 8

## 2025-03-24 NOTE — PROGRESS NOTES
[facility]   Infusion Clinic Note   Date: 2025   Name: Angeline Gurrola  : 1980   MRN: [id number]         Reason for Visit: OP Infusion (Cosentyx 300mg every 28 days)         Today: We administered secukinumab (Cosentyx) 300 mg in sodium chloride 0.9% 100 mL IVPB.       Ordered By: Mimi Raya MD       For a Diagnosis of: Ankylosing spondylitis, unspecified site of spine (Multi)       At today's visit patient accompanied by: Self      Today's Vitals:   Vitals:    25 1305 25 1424   BP: 148/80 113/73   Pulse: 65 62   Resp: 18 18   Temp: 36.7 °C (98 °F) 36.6 °C (97.9 °F)   SpO2: 98% 100%   Weight: (!) 185 kg (408 lb 3.2 oz)    PainSc:   8    PainLoc: Back  Comment: & jaw              Pre - Treatment Checklist:      - Previous reaction to current treatment: no      (Assess patient for the concerns below. Document provider notification as appropriate).  - Active or recent infection with/without current antibiotic use: no  - Recent or planned invasive dental work: no  - Recent or planned surgeries: no  - Recently received or plans to receive vaccinations: no  - Has treatment related toxicities: no  - Any chance may be pregnant:  no      Pain: 8   - Is the pain different from normal: no   - Is prescribing Doctor aware:  yes      Labs: Labs drawn and sent per order      Fall Risk Screening: Kraus Fall Risk  History of Falling, Immediate or Within 3 Months: Yes  Secondary Diagnosis: Yes  Ambulatory Aid: Walks without aid/bedrest/nurse assist  Intravenous Therapy/Heparin Lock: Yes  Gait/Transferring: Normal/bedrest/immobile  Mental Status: Oriented to own ability  Kraus Fall Risk Score: 60       Review Of Systems:  Review of Systems   Constitutional:  Negative for appetite change, chills, fatigue, fever and unexpected weight change.   HENT:   Negative for hearing loss, mouth sores, sore throat, tinnitus, trouble swallowing and voice change.    Eyes:  Negative for eye  "problems.   Respiratory:  Negative for cough, shortness of breath and wheezing.    Cardiovascular:  Negative for chest pain, leg swelling and palpitations.   Gastrointestinal:  Negative for abdominal pain, blood in stool, constipation, diarrhea, nausea and vomiting.   Genitourinary:  Negative for dysuria, frequency and hematuria.    Musculoskeletal:  Positive for arthralgias, back pain and myalgias.   Skin:  Positive for wound. Negative for itching and rash.        Bilateral lower leg wounds-healing, sees wound clinic   Neurological:  Negative for dizziness, extremity weakness, headaches, light-headedness and numbness.         Infusion Readiness:  - Assessment Concerns Related to Infusion: No  - Provider notified: n/a      New Patient Education:    N/A (returning patient for continuation of therapy. Ongoing education provided as needed.)        Treatment Conditions & Drug Specific Questions:    Secukinumab (COSENTYX)    (Unless otherwise specified on patient specific therapy plan):     TREATMENT CONDITIONS:  Unless otherwise specified on patient specific therapy plan HOLD and notify Provider prior to proceeding with today's infusion if patient with:  o Positive T-Spot  o Reactive Hep B and Hep C virus antibody  o Positive pregnancy prior to first treatment in women of childbearing ability    Lab Results   Component Value Date    TBSIN Negative 03/06/2024    QFG NEGATIVE 05/23/2018      Lab Results   Component Value Date    HEPBSAG Nonreactive 03/28/2024      No results found for: \"NONUHFIRE\", \"NONUHSWGH\", \"NONUHFISH\", \"EXTHEPBSAG\"  No results found for: \"HBCTI\", \"HEPBCAB\"  Lab Results   Component Value Date    HEPCAB Nonreactive 03/28/2024      Lab Results   Component Value Date    HEPCAB Nonreactive 03/28/2024       If available for review: CBC-D; CMP; CRP; LFT  Lab Results   Component Value Date    WBC 9.0 12/12/2024    HGB 12.7 12/12/2024    HCT 41.8 12/12/2024    MCV 94 12/12/2024     12/12/2024        " Chemistry    Lab Results   Component Value Date/Time     12/12/2024 1301    K 4.3 12/12/2024 1301    CL 98 12/12/2024 1301    CO2 32 12/12/2024 1301    BUN 18 12/12/2024 1301    CREATININE 0.91 12/12/2024 1301    Lab Results   Component Value Date/Time    CALCIUM 9.3 12/12/2024 1301    ALKPHOS 121 (H) 12/12/2024 1301    AST 16 12/12/2024 1301    ALT 18 12/12/2024 1301    BILITOT 0.5 12/12/2024 1301           Lab Results   Component Value Date    CRP 1.81 (H) 03/06/2024      Lab Results   Component Value Date    ALT 18 12/12/2024    AST 16 12/12/2024    ALKPHOS 121 (H) 12/12/2024    BILITOT 0.5 12/12/2024        Patient meets treatment conditions? Yes    DRUG SPECIFIC QUESTIONS:  Up to date on all immunizations per patient report? Yes    Do you have a history of irritable bowel disease? Yes          (If yes educate patient that treatment may cause exacerbations and/or new onset of inflammatory bowel of inflammatory bowel disease)    Any new or recent diagnosis of cancer, specifically skin cancer?No          (If yes notify provider prior to proceeding)    -   Any new or worsening rashes / lesions? No  ( If YES notify provider piror to proceeding)    REMINDER:   WEIGHT BASED DRUG   PREGNANCY TEST PRIOR TO FIRST INFUSION FOR WOMEN OF CHILDBEARING ABILITY.    Recommended Vitals/Observation:  Vitals: Obtain vital signs at start and end of infusion, and as needed.  Observation: No observation.         Weight Based Drug Calculations:    WEIGHT BASED DRUGS: NOT APPLICABLE / FLAT DOSE       Post Treatment: Patient tolerated treatment without issue and was discharged in no apparent distress.      Note Authored / Patient Cared for By: Jessica Johnson RN

## 2025-03-24 NOTE — PATIENT INSTRUCTIONS
Today :We administered secukinumab (Cosentyx) 300 mg in sodium chloride 0.9% 100 mL IVPB.     For:   1. Ankylosing spondylitis, unspecified site of spine (Multi)         Your next appointment is due in:  28 days        Please read the  Medication Guide that was given to you and reviewed during todays visit.     (Tell all doctors including dentists that you are taking this medication)     Go to the emergency room or call 911 if:  -You have signs of allergic reaction:   -Rash, hives, itching.   -Swollen, blistered, peeling skin.   -Swelling of face, lips, mouth, tongue or throat.   -Tightness of chest, trouble breathing, swallowing or talking     Call your doctor:  - If IV / injection site gets red, warm, swollen, itchy or leaks fluid or pus.     (Leave dressing on your IV site for at least 2 hours and keep area clean and dry  - If you get sick or have symptoms of infection or are not feeling well for any reason.    (Wash your hands often, stay away from people who are sick)  - If you have side effects from your medication that do not go away or are bothersome.     (Refer to the teaching your nurse gave you for side effects to call your doctor about)    - Common side effects may include:  stuffy nose, headache, feeling tired, muscle aches, upset stomach  - Before receiving any vaccines     - Call [facility] at   If:  - You get sick, are on antibiotics, have had a recent vaccine, have surgery or dental work and your doctor wants your visit rescheduled.  - You need to cancel and reschedule your visit for any reason. Call at least 2 days before your visit if you need to cancel.   - Your insurance changes before your next visit.    (We will need to get approval from your new insurance. This can take up to two weeks.)     [facility] is opened Monday thru Friday. We are closed on weekends and holidays.   Voice mail will take your call if the center is closed. If you leave a message  please allow 24 hours for a call back during weekdays. If you leave a message on a weekend/holiday, we will call you back the next business day.    A pharmacist is available Monday - Friday from 8:30AM to 3:30PM to help answer any questions you may have about your prescriptions(s). Please call pharmacy at:    [facility]: [phone]  [facility]: [phone]  [facility]: [phone]

## 2025-03-24 NOTE — Clinical Note
The drug, cosentyx, was deleted from billing and I'm not sure how to  add it back in. Are you able to assist? Thanks.

## 2025-04-21 ENCOUNTER — APPOINTMENT (OUTPATIENT)
Dept: INFUSION THERAPY | Facility: CLINIC | Age: 45
End: 2025-04-21
Payer: COMMERCIAL

## 2025-04-21 VITALS
DIASTOLIC BLOOD PRESSURE: 59 MMHG | RESPIRATION RATE: 16 BRPM | WEIGHT: 293 LBS | OXYGEN SATURATION: 100 % | HEART RATE: 56 BPM | BODY MASS INDEX: 62.89 KG/M2 | SYSTOLIC BLOOD PRESSURE: 103 MMHG | TEMPERATURE: 97.6 F

## 2025-04-21 DIAGNOSIS — M45.9 ANKYLOSING SPONDYLITIS, UNSPECIFIED SITE OF SPINE (MULTI): ICD-10-CM

## 2025-04-21 PROCEDURE — 96413 CHEMO IV INFUSION 1 HR: CPT | Performed by: NURSE PRACTITIONER

## 2025-04-21 RX ORDER — DIPHENHYDRAMINE HYDROCHLORIDE 50 MG/ML
50 INJECTION, SOLUTION INTRAMUSCULAR; INTRAVENOUS AS NEEDED
OUTPATIENT
Start: 2025-05-19

## 2025-04-21 RX ORDER — FAMOTIDINE 10 MG/ML
20 INJECTION, SOLUTION INTRAVENOUS ONCE AS NEEDED
OUTPATIENT
Start: 2025-05-19

## 2025-04-21 RX ORDER — ALBUTEROL SULFATE 0.83 MG/ML
3 SOLUTION RESPIRATORY (INHALATION) AS NEEDED
OUTPATIENT
Start: 2025-05-19

## 2025-04-21 RX ORDER — EPINEPHRINE 0.3 MG/.3ML
0.3 INJECTION SUBCUTANEOUS EVERY 5 MIN PRN
OUTPATIENT
Start: 2025-05-19

## 2025-04-21 ASSESSMENT — ENCOUNTER SYMPTOMS
MYALGIAS: 1
FATIGUE: 1
DIARRHEA: 0
CONSTIPATION: 0
ABDOMINAL PAIN: 0
FREQUENCY: 0
HEADACHES: 0
VOICE CHANGE: 0
DYSURIA: 0
WHEEZING: 0
WOUND: 0
DIZZINESS: 0
NAUSEA: 0
LIGHT-HEADEDNESS: 0
BRUISES/BLEEDS EASILY: 0
SORE THROAT: 0
UNEXPECTED WEIGHT CHANGE: 0
TROUBLE SWALLOWING: 0
VOMITING: 0
PALPITATIONS: 0
EYE PROBLEMS: 0
COUGH: 0
HEMATURIA: 0
BACK PAIN: 1
BLOOD IN STOOL: 0
NUMBNESS: 0
LEG SWELLING: 0
CHILLS: 0
SHORTNESS OF BREATH: 0
EXTREMITY WEAKNESS: 0
FEVER: 0
ARTHRALGIAS: 1
APPETITE CHANGE: 0

## 2025-04-21 ASSESSMENT — PAIN SCALES - GENERAL: PAINLEVEL_OUTOF10: 7

## 2025-04-21 NOTE — PATIENT INSTRUCTIONS
Today :We administered secukinumab (Cosentyx) 300 mg in sodium chloride 0.9% 100 mL IVPB.     For:   1. Ankylosing spondylitis, unspecified site of spine (Multi)         Your next appointment is due in:  28 days        Please read the  Medication Guide that was given to you and reviewed during todays visit.     (Tell all doctors including dentists that you are taking this medication)     Go to the emergency room or call 911 if:  -You have signs of allergic reaction:   -Rash, hives, itching.   -Swollen, blistered, peeling skin.   -Swelling of face, lips, mouth, tongue or throat.   -Tightness of chest, trouble breathing, swallowing or talking     Call your doctor:  - If IV / injection site gets red, warm, swollen, itchy or leaks fluid or pus.     (Leave dressing on your IV site for at least 2 hours and keep area clean and dry  - If you get sick or have symptoms of infection or are not feeling well for any reason.    (Wash your hands often, stay away from people who are sick)  - If you have side effects from your medication that do not go away or are bothersome.     (Refer to the teaching your nurse gave you for side effects to call your doctor about)    - Common side effects may include:  stuffy nose, headache, feeling tired, muscle aches, upset stomach  - Before receiving any vaccines     - Call [facility] at   If:  - You get sick, are on antibiotics, have had a recent vaccine, have surgery or dental work and your doctor wants your visit rescheduled.  - You need to cancel and reschedule your visit for any reason. Call at least 2 days before your visit if you need to cancel.   - Your insurance changes before your next visit.    (We will need to get approval from your new insurance. This can take up to two weeks.)     [facility] is opened Monday thru Friday. We are closed on weekends and holidays.   Voice mail will take your call if the center is closed. If you leave a message  please allow 24 hours for a call back during weekdays. If you leave a message on a weekend/holiday, we will call you back the next business day.    A pharmacist is available Monday - Friday from 8:30AM to 3:30PM to help answer any questions you may have about your prescriptions(s). Please call pharmacy at:    [facility]: [phone]  [facility]: [phone]  [facility]: [phone]

## 2025-04-21 NOTE — PROGRESS NOTES
[facility]   Infusion Clinic Note   Date: 2025   Name: Angeline Gurrola  : 1980   MRN: [id number]         Reason for Visit: OP Infusion (Cosentyx 300 mg every 28 days)         Today: We administered secukinumab (Cosentyx) 300 mg in sodium chloride 0.9% 100 mL IVPB.       Ordered By: Mimi Raya MD       For a Diagnosis of: Ankylosing spondylitis, unspecified site of spine (Multi)       At today's visit patient accompanied by: Self      Today's Vitals:   Vitals:    25 1306 25 1425   BP: 91/56 103/59   Pulse: 62 56   Resp: 18 16   Temp: 36.5 °C (97.7 °F) 36.4 °C (97.6 °F)   SpO2: 99% 100%   Weight: (!) 188 kg (413 lb 9.6 oz)    PainSc:   7    PainLoc: Generalized  Comment: knees & jaw    LMP: 2025             Pre - Treatment Checklist:      - Previous reaction to current treatment: no      (Assess patient for the concerns below. Document provider notification as appropriate).  - Active or recent infection with/without current antibiotic use: no  - Recent or planned invasive dental work: root canal 3 weeks ago  - Recent or planned surgeries: no  - Recently received or plans to receive vaccinations: no  - Has treatment related toxicities: no  - Any chance may be pregnant:  no      Pain: 7   - Is the pain different from normal: no   - Is prescribing Doctor aware:  n/a      Labs: Reviewed       Fall Risk Screening: Kraus Fall Risk  History of Falling, Immediate or Within 3 Months: No  Secondary Diagnosis: Yes  Ambulatory Aid: Walks without aid/bedrest/nurse assist  Intravenous Therapy/Heparin Lock: No  Gait/Transferring: Normal/bedrest/immobile  Mental Status: Oriented to own ability  Kraus Fall Risk Score: 15       Review Of Systems:  Review of Systems   Constitutional:  Positive for fatigue. Negative for appetite change, chills, fever and unexpected weight change.   HENT:   Negative for hearing loss, mouth sores, sore throat, tinnitus, trouble swallowing  "and voice change.    Eyes:  Negative for eye problems.   Respiratory:  Negative for cough, shortness of breath and wheezing.    Cardiovascular:  Negative for chest pain, leg swelling and palpitations.   Gastrointestinal:  Negative for abdominal pain, blood in stool, constipation, diarrhea, nausea and vomiting.   Genitourinary:  Negative for dysuria, frequency and hematuria.    Musculoskeletal:  Positive for arthralgias, back pain and myalgias.   Skin:  Negative for itching, rash and wound.   Neurological:  Negative for dizziness, extremity weakness, headaches, light-headedness and numbness.   Hematological:  Does not bruise/bleed easily.         Infusion Readiness:  - Assessment Concerns Related to Infusion: No  - Provider notified: n/a      New Patient Education:    N/A (returning patient for continuation of therapy. Ongoing education provided as needed.)        Treatment Conditions & Drug Specific Questions:    Secukinumab (COSENTYX)    (Unless otherwise specified on patient specific therapy plan):     TREATMENT CONDITIONS:  Unless otherwise specified on patient specific therapy plan HOLD and notify Provider prior to proceeding with today's infusion if patient with:  o Positive T-Spot  o Reactive Hep B and Hep C virus antibody  o Positive pregnancy prior to first treatment in women of childbearing ability    Lab Results   Component Value Date    TBSIN Negative 03/06/2024    QFG NEGATIVE 05/23/2018      Lab Results   Component Value Date    HEPBSAG Nonreactive 03/28/2024      No results found for: \"NONUHFIRE\", \"NONUHSWGH\", \"NONUHFISH\", \"EXTHEPBSAG\"  No results found for: \"HBCTI\", \"HEPBCAB\"  Lab Results   Component Value Date    HEPCAB Nonreactive 03/28/2024      Lab Results   Component Value Date    HEPCAB Nonreactive 03/28/2024       If available for review: CBC-D; CMP; CRP; LFT  Lab Results   Component Value Date    WBC 8.4 03/24/2025    HGB 13.1 03/24/2025    HCT 40.2 03/24/2025    MCV 93 03/24/2025     " 03/24/2025        Chemistry    Lab Results   Component Value Date/Time     03/24/2025 1314    K 4.2 03/24/2025 1314     03/24/2025 1314    CO2 28 03/24/2025 1314    BUN 18 03/24/2025 1314    CREATININE 0.86 03/24/2025 1314    Lab Results   Component Value Date/Time    CALCIUM 9.0 03/24/2025 1314    ALKPHOS 94 03/24/2025 1314    AST 21 03/24/2025 1314    ALT 20 03/24/2025 1314    BILITOT 0.4 03/24/2025 1314           Lab Results   Component Value Date    CRP 1.81 (H) 03/06/2024      Lab Results   Component Value Date    ALT 20 03/24/2025    AST 21 03/24/2025    ALKPHOS 94 03/24/2025    BILITOT 0.4 03/24/2025        Patient meets treatment conditions? Yes    DRUG SPECIFIC QUESTIONS:  Up to date on all immunizations per patient report? Yes    Do you have a history of irritable bowel disease? No          (If yes educate patient that treatment may cause exacerbations and/or new onset of inflammatory bowel of inflammatory bowel disease)    Any new or recent diagnosis of cancer, specifically skin cancer?No          (If yes notify provider prior to proceeding)    -   Any new or worsening rashes / lesions? No  ( If YES notify provider piror to proceeding)    REMINDER:   WEIGHT BASED DRUG   PREGNANCY TEST PRIOR TO FIRST INFUSION FOR WOMEN OF CHILDBEARING ABILITY.    Recommended Vitals/Observation:  Vitals: Obtain vital signs at start and end of infusion, and as needed.  Observation: No observation.         Weight Based Drug Calculations:    WEIGHT BASED DRUGS: Secukinumab (COSENTYX)   Patient's dosing weight (kg): 208     10% weight variance for prescribed treatment: 187.2 kg to 228.8 kg     Patient's weight today:   Vitals:    04/21/25 1306   Weight: (!) 188 kg (413 lb 9.6 oz)         weight range for prescribed dose:     Patient weight today falls outside of 10% variance or  weight range: No     Home Care pharmacist informed of weight variance: n/a    Doses that are weight based have an  acceptable variance rule within 10% of the prescribed   order and/or within  weight range. If patient weight on day of infusion falls   outside of the 10% variance, or weight range, infusion is administered and   pharmacy contacted regarding future dosing adjustments, per policy.      Post Treatment: Patient tolerated treatment without issue and was discharged in no apparent distress.      Note Authored / Patient Cared for By: Daisy Smith RN   _________________________________________________________________________    Note authored and patient cared for by: Daisy Smith RN    Note/Encounter reviewed by: Jany MONTAGUE NP. This provider on site at time of patient infusion. Infusion staff to notify this provider of any questions, concerns, abnormals or issues during infusion.    Final check of medication completed by this EDDI / or on-site pharmacist with administering nurse using positive identification prior to administration. Final appearance of product checked for accuracy and conformity to the formula of the prepared product. Assured use of correct ingredients, accurate calculations and precise measurements under appropriate conditions and procedures.    No issues reported during today's encounter. Pt. tolerated infusion without difficulty. Pt. not independently evaluated by this provider during today's encounter.  (Jany MONTAGUE NP)

## 2025-04-29 DIAGNOSIS — F41.9 ANXIETY: ICD-10-CM

## 2025-04-30 RX ORDER — ALPRAZOLAM 1 MG/1
1 TABLET ORAL 2 TIMES DAILY PRN
Qty: 60 TABLET | Refills: 0 | Status: SHIPPED | OUTPATIENT
Start: 2025-04-30

## 2025-05-01 ENCOUNTER — APPOINTMENT (OUTPATIENT)
Dept: ORTHOPEDIC SURGERY | Facility: CLINIC | Age: 45
End: 2025-05-01
Payer: COMMERCIAL

## 2025-05-01 DIAGNOSIS — E66.9 OBESITY, UNSPECIFIED CLASS, UNSPECIFIED OBESITY TYPE, UNSPECIFIED WHETHER SERIOUS COMORBIDITY PRESENT: Primary | ICD-10-CM

## 2025-05-01 DIAGNOSIS — M17.0 PRIMARY OSTEOARTHRITIS OF BOTH KNEES: ICD-10-CM

## 2025-05-01 PROCEDURE — 20610 DRAIN/INJ JOINT/BURSA W/O US: CPT | Performed by: ORTHOPAEDIC SURGERY

## 2025-05-01 PROCEDURE — 1036F TOBACCO NON-USER: CPT | Performed by: ORTHOPAEDIC SURGERY

## 2025-05-01 PROCEDURE — 99214 OFFICE O/P EST MOD 30 MIN: CPT | Performed by: ORTHOPAEDIC SURGERY

## 2025-05-01 RX ORDER — TRIAMCINOLONE ACETONIDE 40 MG/ML
2.5 INJECTION, SUSPENSION INTRA-ARTICULAR; INTRAMUSCULAR
Status: COMPLETED | OUTPATIENT
Start: 2025-05-01 | End: 2025-05-01

## 2025-05-01 RX ADMIN — TRIAMCINOLONE ACETONIDE 2.5 MG: 40 INJECTION, SUSPENSION INTRA-ARTICULAR; INTRAMUSCULAR at 09:19

## 2025-05-01 ASSESSMENT — PAIN - FUNCTIONAL ASSESSMENT: PAIN_FUNCTIONAL_ASSESSMENT: 0-10

## 2025-05-01 ASSESSMENT — PAIN SCALES - GENERAL: PAINLEVEL_OUTOF10: 8

## 2025-05-01 NOTE — PROGRESS NOTES
This is a consultation from Dr. Richard Dailey DO for   Chief Complaint   Patient presents with    Left Knee - Pain    Right Knee - Pain       This is a 45 y.o. female who presents for follow-up for her bilateral knees.  I gave her an injection of the left knee back in the fall in September.  This was helpful and lasted for a long time.  In the last few weeks she has had return of presenting exacerbation of her pain, sharp pain over the medial knee on both sides worse with walking proving at rest.  She is felt some instability lately.  She is working on weight loss.  No numbness no tingling no fevers no chills no shooting pain down the leg.    Physical Exam    There has been no interval change in this patient's past medical, surgical, medications, allergies, family history or social history since the most recent visit to a provider within our department. 14 point review of systems was performed, reviewed, and negative except for pertinent positives documented in the history of present illness.     Constitutional: well developed, well nourished female in no acute distress  Psychiatric: normal mood, appropriate affect  Eyes: sclera anicteric  HENT: normocephalic/atraumatic  CV: regular rate and rhythm   Respiratory: non labored breathing  Integumentary: no rash  Neurological: moves all extremities    Bilateral knee exam: skin intact no lacerations or abrations. no effusion.  Tender medial and lateral joint line. negative log roll negative patellar grind. ROM 0-120. stable to varus and valgus stress at 0 and 30 degrees. negative lachman negative posterior drawer negative tim. 5/5 ehl/fhl/gs/ta. silt s/s/sp/dp/t. 2+ dp/pt        L Inj/Asp: bilateral knee on 5/1/2025 9:19 AM  Indications: pain and joint swelling  Details: 22 G needle, anterolateral approach  Medications (Right): 2.5 mg triamcinolone acetonide 40 mg/mL  Medications (Left): 2.5 mg triamcinolone acetonide 40 mg/mL    Discussion:  I discussed the  conservative treatment options for knee osteoarthritis including but not limited to physical therapy, oral NSAIDS, activity and lifestyle modification, and corticosteroid injections. Pt has elected to undergo a cortisone injection today. I have explained the risk and benefits of an injection including the possibility of joint infection, bleeding, damage to cartilage, allergic reaction. Patient verbalized understanding and gave verbal consent wishes to proceed with a intra-articular cortisone injection for their knee.    Procedure:  After discussing the risk and benefits of the procedure, we proceeded with an intra-articular bilateral knee injection. We discussed the risks and benefits and potential morbidity related to the treatment, and to the prescription medication administered in the injection    With the patient's informed verbal consent, the bilateral knees were prepped in standard sterile fashion with Chlorhexidine. The skin was then anesthetized with ethyl chloride spray and cleaned again with Chlorhexidine. The bilateral knees were then apirated/injected with a prefilled 20-gauge syringe of 40 mg Kenalog + 4 ml Lidocaine using the lateral approach without complications.  The patient tolerated this well and felt immediate initial relief of symptoms. A bandaid was applied and the patient ambulated out of the clinic on ther own accord without difficulty. Patient was instructed to avoid physical activity for 24-48 hours to prevent the knees from swelling and may ice the knees as tolerated. Patient should contact the office if any signs of of infection appear: redness, fever, chills, drainage, swelling or warmth to the knees.  Pt understands that the injections can be repeated no sooner than 3 months.      Procedure, treatment alternatives, risks and benefits explained, specific risks discussed. Consent was given by the patient. Immediately prior to procedure a time out was called to verify the correct patient,  "procedure, equipment, support staff and site/side marked as required. Patient was prepped and draped in the usual sterile fashion.             Impression/Plan: This is a 45 y.o. female with bilateral knee arthritis.  I had an in depth discussion with the patient regarding treatment options for arthritis and their relative risks and benefits. We reviewed surgical and nonsurgical option for treatment. Treatments include anti inflammatory medications, physical therapy, weight loss, activity modification, use of assistive devices, injection therapies. We discussed current prescriptions and risks and benefits of continuation of prescription medication as apporpriate. We discussed that arthritis is often progressive over time, an in end stage arthritis surgical interventions can be considered, including arthroplasty. All questions were answered and the patient voiced their understanding.  She is still working on weight loss, we will get her set up with bariatric surgery.  I will see her back as needed for further injections    BMI Readings from Last 1 Encounters:   04/21/25 62.89 kg/m²      Lab Results   Component Value Date    CREATININE 0.86 03/24/2025     Tobacco Use: Low Risk  (5/1/2025)    Patient History     Smoking Tobacco Use: Never     Smokeless Tobacco Use: Never     Passive Exposure: Not on file      Computed MELD 3.0 unavailable. One or more values for this score either were not found within the given timeframe or did not fit some other criterion.  Computed MELD-Na unavailable. One or more values for this score either were not found within the given timeframe or did not fit some other criterion.       Lab Results   Component Value Date    HGBA1C 5.5 05/17/2024     No results found for: \"STAPHMRSASCR\"  "

## 2025-05-02 ENCOUNTER — APPOINTMENT (OUTPATIENT)
Dept: PRIMARY CARE | Facility: CLINIC | Age: 45
End: 2025-05-02
Payer: COMMERCIAL

## 2025-05-14 ENCOUNTER — OFFICE VISIT (OUTPATIENT)
Dept: PRIMARY CARE | Facility: CLINIC | Age: 45
End: 2025-05-14
Payer: COMMERCIAL

## 2025-05-14 VITALS
BODY MASS INDEX: 44.41 KG/M2 | SYSTOLIC BLOOD PRESSURE: 102 MMHG | TEMPERATURE: 98.4 F | WEIGHT: 293 LBS | OXYGEN SATURATION: 97 % | DIASTOLIC BLOOD PRESSURE: 58 MMHG | HEART RATE: 60 BPM | HEIGHT: 68 IN

## 2025-05-14 DIAGNOSIS — G44.89 OTHER HEADACHE SYNDROME: ICD-10-CM

## 2025-05-14 DIAGNOSIS — R43.2 TASTE IMPAIRMENT: ICD-10-CM

## 2025-05-14 DIAGNOSIS — L29.9 ITCHING: ICD-10-CM

## 2025-05-14 DIAGNOSIS — G93.5 CHIARI I MALFORMATION (MULTI): ICD-10-CM

## 2025-05-14 DIAGNOSIS — F41.9 ANXIETY: ICD-10-CM

## 2025-05-14 DIAGNOSIS — D32.9 MENINGIOMA (MULTI): ICD-10-CM

## 2025-05-14 DIAGNOSIS — R51.9 FACIAL PAIN: Primary | ICD-10-CM

## 2025-05-14 DIAGNOSIS — D50.8 IRON DEFICIENCY ANEMIA SECONDARY TO INADEQUATE DIETARY IRON INTAKE: ICD-10-CM

## 2025-05-14 DIAGNOSIS — H53.8 BLURRED VISION, RIGHT EYE: ICD-10-CM

## 2025-05-14 LAB
ALBUMIN SERPL-MCNC: 4.2 G/DL (ref 3.6–5.1)
ALP SERPL-CCNC: 113 U/L (ref 31–125)
ALT SERPL-CCNC: 17 U/L (ref 6–29)
ANION GAP SERPL CALCULATED.4IONS-SCNC: 10 MMOL/L (CALC) (ref 7–17)
AST SERPL-CCNC: 15 U/L (ref 10–35)
BASOPHILS # BLD AUTO: 85 CELLS/UL (ref 0–200)
BASOPHILS NFR BLD AUTO: 0.7 %
BILIRUB SERPL-MCNC: 0.6 MG/DL (ref 0.2–1.2)
BUN SERPL-MCNC: 19 MG/DL (ref 7–25)
CALCIUM SERPL-MCNC: 9.6 MG/DL (ref 8.6–10.2)
CHLORIDE SERPL-SCNC: 97 MMOL/L (ref 98–110)
CO2 SERPL-SCNC: 29 MMOL/L (ref 20–32)
CREAT SERPL-MCNC: 0.79 MG/DL (ref 0.5–0.99)
EGFRCR SERPLBLD CKD-EPI 2021: 93 ML/MIN/1.73M2
EOSINOPHIL # BLD AUTO: 351 CELLS/UL (ref 15–500)
EOSINOPHIL NFR BLD AUTO: 2.9 %
ERYTHROCYTE [DISTWIDTH] IN BLOOD BY AUTOMATED COUNT: 13 % (ref 11–15)
FERRITIN SERPL-MCNC: 40 NG/ML (ref 16–232)
GLUCOSE SERPL-MCNC: 96 MG/DL (ref 65–99)
HCT VFR BLD AUTO: 40.5 % (ref 35–45)
HGB BLD-MCNC: 13.5 G/DL (ref 11.7–15.5)
IRON SATN MFR SERPL: 26 % (CALC) (ref 16–45)
IRON SERPL-MCNC: 101 MCG/DL (ref 40–190)
LYMPHOCYTES # BLD AUTO: 3255 CELLS/UL (ref 850–3900)
LYMPHOCYTES NFR BLD AUTO: 26.9 %
MCH RBC QN AUTO: 31.3 PG (ref 27–33)
MCHC RBC AUTO-ENTMCNC: 33.3 G/DL (ref 32–36)
MCV RBC AUTO: 93.8 FL (ref 80–100)
MONOCYTES # BLD AUTO: 714 CELLS/UL (ref 200–950)
MONOCYTES NFR BLD AUTO: 5.9 %
NEUTROPHILS # BLD AUTO: 7696 CELLS/UL (ref 1500–7800)
NEUTROPHILS NFR BLD AUTO: 63.6 %
PLATELET # BLD AUTO: 367 THOUSAND/UL (ref 140–400)
PMV BLD REES-ECKER: 10 FL (ref 7.5–12.5)
POTASSIUM SERPL-SCNC: 4.7 MMOL/L (ref 3.5–5.3)
PROT SERPL-MCNC: 7 G/DL (ref 6.1–8.1)
RBC # BLD AUTO: 4.32 MILLION/UL (ref 3.8–5.1)
SODIUM SERPL-SCNC: 136 MMOL/L (ref 135–146)
TIBC SERPL-MCNC: 386 MCG/DL (CALC) (ref 250–450)
VIT B12 SERPL-MCNC: 941 PG/ML (ref 200–1100)
WBC # BLD AUTO: 12.1 THOUSAND/UL (ref 3.8–10.8)

## 2025-05-14 PROCEDURE — 99214 OFFICE O/P EST MOD 30 MIN: CPT | Performed by: FAMILY MEDICINE

## 2025-05-14 PROCEDURE — 1036F TOBACCO NON-USER: CPT | Performed by: FAMILY MEDICINE

## 2025-05-14 PROCEDURE — 3008F BODY MASS INDEX DOCD: CPT | Performed by: FAMILY MEDICINE

## 2025-05-14 RX ORDER — ALPRAZOLAM 1 MG/1
1 TABLET ORAL 2 TIMES DAILY PRN
Qty: 60 TABLET | Refills: 2 | Status: SHIPPED | OUTPATIENT
Start: 2025-05-30

## 2025-05-14 RX ORDER — HYDROXYZINE HYDROCHLORIDE 25 MG/1
25 TABLET, FILM COATED ORAL EVERY 6 HOURS PRN
Qty: 60 TABLET | Refills: 6 | Status: SHIPPED | OUTPATIENT
Start: 2025-05-14

## 2025-05-14 ASSESSMENT — ENCOUNTER SYMPTOMS
ENDOCRINE NEGATIVE: 1
DIFFICULTY URINATING: 0
OCCASIONAL FEELINGS OF UNSTEADINESS: 1
SHORTNESS OF BREATH: 0
TASTE DISTURBANCE: 1
DIZZINESS: 0
LOSS OF SENSATION IN FEET: 0
DEPRESSION: 1
FEVER: 0
DIARRHEA: 0
NAUSEA: 0

## 2025-05-14 ASSESSMENT — PAIN SCALES - GENERAL: PAINLEVEL_OUTOF10: 6

## 2025-05-14 NOTE — PROGRESS NOTES
Subjective   Patient ID: Angeline Gurrola is a 45 y.o. female who presents for Facial Pain (Right side x 6 weeks), Itching (Legs with no signs of rash), and Altered Taste (Everything is tasting salty).    HPI  The pt presents to the clinic with concerns of right-sided facial pain, itching on legs, and altered taste. Past medical hx of HTN, anxiety, and insomnia.    -Facial pain: Pt endorses pain starting from right jaw all the way into right ear region. She originally thought pain had to do with her teeth so she had two root canal treatments but pain continued to persist. She states that she has been experiencing headaches secondary to this pain. Also notes some blurry vision in her right eye during pain flare-ups. She has also been experiencing altered taste (in which everything tastes salty) and wonders if this symptom may be associated with this condition. She notes that symptoms tend to improve after taking 2 tablets of Tylenol and 2 tablets of Motrin together. As such, pt received referral to neurology (Dr. Stark) for further evaluation of this condition. In the meantime, an MRI of brain was ordered for further investigation of this condition.    -Itching on bilateral lower legs: Pt experiencing feelings of itchiness on her bilateral lower legs. No signs/evidence of rash and/or dermatitis in this region. Requests medication to treat this condition. As such, pt received prescription for Atarax medication to treat this condition.    -Anxiety: Controlled. No suicidal thoughts/ideation. Taking Xanax 1 mg 2 times daily PRN. Doing well on this medication. No side-effects reported. OARRS reviewed (okay). She is up-to-date on CSA/UDS. Refilled Xanax prescription.    Facial Pain  Pertinent negatives include no chest pain, fever, nausea or rash.   Altered Taste  Pertinent negatives include no chest pain, fever, nausea or rash.      Review of Systems   Constitutional:  Negative for fever.        Also see HPI   Eyes:   "Negative for visual disturbance.   Respiratory:  Negative for shortness of breath.    Cardiovascular:  Negative for chest pain.   Gastrointestinal:  Negative for diarrhea and nausea.   Endocrine: Negative.    Genitourinary:  Negative for difficulty urinating.   Skin:  Negative for rash.   Neurological:  Negative for dizziness.        No focal deficits   Psychiatric/Behavioral:  Negative for suicidal ideas.    All other systems reviewed and are negative.      Objective   /58   Pulse 60   Temp 36.9 °C (98.4 °F)   Ht 1.727 m (5' 8\")   Wt (!) 183 kg (403 lb)   LMP 04/19/2025 (Approximate)   SpO2 97%   BMI 61.28 kg/m²     Physical Exam  Vitals and nursing note reviewed.   Constitutional:       Appearance: Normal appearance.   HENT:      Head: Normocephalic and atraumatic.      Jaw: Tenderness present.      Comments: Tenderness from right side of jaw all the way up to right ear region.  Eyes:      Conjunctiva/sclera: Conjunctivae normal.   Cardiovascular:      Rate and Rhythm: Normal rate and regular rhythm.      Heart sounds: Normal heart sounds.   Pulmonary:      Effort: Pulmonary effort is normal.      Breath sounds: Normal breath sounds.   Neurological:      Mental Status: She is oriented to person, place, and time.   Psychiatric:         Mood and Affect: Mood normal.         Behavior: Behavior normal.         Assessment/Plan   Diagnoses and all orders for this visit:  Facial pain  -     Referral to Neurology; Future  -     MR brain w and wo IV contrast; Future  Anxiety  -     ALPRAZolam (Xanax) 1 mg tablet; Take 1 tablet (1 mg) by mouth 2 times a day as needed for anxiety. Do not fill before May 30, 2025.  -     Follow Up In Primary Care - Established; Future  Taste impairment  Other headache syndrome  -     Referral to Neurology; Future  -     MR brain w and wo IV contrast; Future  Blurred vision, right eye  -     Referral to Neurology; Future  -     MR brain w and wo IV contrast; Future  Chiari I " malformation (Multi)  -     MR brain w and wo IV contrast; Future  Meningioma (Multi)  -     MR brain w and wo IV contrast; Future  Itching  -     hydrOXYzine HCL (Atarax) 25 mg tablet; Take 1 tablet (25 mg) by mouth every 6 hours if needed for itching.  Iron deficiency anemia secondary to inadequate dietary iron intake  -     CBC and Auto Differential; Future  -     Comprehensive Metabolic Panel; Future  -     Ferritin; Future  -     Iron and TIBC; Future  -     Vitamin B12; Future         Scribe Attestation  By signing my name below, IYusef Scribe   attest that this documentation has been prepared under the direction and in the presence of Richard Dailey DO.

## 2025-05-19 ENCOUNTER — APPOINTMENT (OUTPATIENT)
Dept: INFUSION THERAPY | Facility: CLINIC | Age: 45
End: 2025-05-19
Payer: COMMERCIAL

## 2025-05-19 VITALS
HEART RATE: 56 BPM | OXYGEN SATURATION: 100 % | BODY MASS INDEX: 61.88 KG/M2 | SYSTOLIC BLOOD PRESSURE: 103 MMHG | DIASTOLIC BLOOD PRESSURE: 48 MMHG | TEMPERATURE: 98.3 F | WEIGHT: 293 LBS | RESPIRATION RATE: 18 BRPM

## 2025-05-19 DIAGNOSIS — M45.9 ANKYLOSING SPONDYLITIS, UNSPECIFIED SITE OF SPINE (MULTI): ICD-10-CM

## 2025-05-19 PROCEDURE — 96413 CHEMO IV INFUSION 1 HR: CPT | Performed by: NURSE PRACTITIONER

## 2025-05-19 RX ORDER — FAMOTIDINE 10 MG/ML
20 INJECTION, SOLUTION INTRAVENOUS ONCE AS NEEDED
OUTPATIENT
Start: 2025-06-10

## 2025-05-19 RX ORDER — DIPHENHYDRAMINE HYDROCHLORIDE 50 MG/ML
50 INJECTION, SOLUTION INTRAMUSCULAR; INTRAVENOUS AS NEEDED
OUTPATIENT
Start: 2025-06-10

## 2025-05-19 RX ORDER — ALBUTEROL SULFATE 0.83 MG/ML
3 SOLUTION RESPIRATORY (INHALATION) AS NEEDED
OUTPATIENT
Start: 2025-06-10

## 2025-05-19 RX ORDER — EPINEPHRINE 0.3 MG/.3ML
0.3 INJECTION SUBCUTANEOUS EVERY 5 MIN PRN
OUTPATIENT
Start: 2025-06-10

## 2025-05-19 ASSESSMENT — ENCOUNTER SYMPTOMS
VOICE CHANGE: 0
LIGHT-HEADEDNESS: 0
DYSURIA: 0
BRUISES/BLEEDS EASILY: 0
EXTREMITY WEAKNESS: 0
WHEEZING: 0
SHORTNESS OF BREATH: 0
UNEXPECTED WEIGHT CHANGE: 0
ARTHRALGIAS: 1
PALPITATIONS: 0
VOMITING: 0
TROUBLE SWALLOWING: 0
NUMBNESS: 0
HEMATURIA: 0
MYALGIAS: 0
LEG SWELLING: 0
NAUSEA: 0
DIZZINESS: 0
FREQUENCY: 0
BLOOD IN STOOL: 0
FEVER: 0
DIARRHEA: 0
FATIGUE: 1
SORE THROAT: 0
APPETITE CHANGE: 0
HEADACHES: 1
CHILLS: 0
EYE PROBLEMS: 0
COUGH: 0
ABDOMINAL PAIN: 0
WOUND: 0
CONSTIPATION: 0

## 2025-05-19 ASSESSMENT — PAIN SCALES - GENERAL: PAINLEVEL_OUTOF10: 8

## 2025-05-19 NOTE — PATIENT INSTRUCTIONS
Today :We administered secukinumab (Cosentyx) 300 mg in sodium chloride 0.9% 100 mL IVPB.     For:   1. Ankylosing spondylitis, unspecified site of spine (Multi)         Your next appointment is due in:  28 days        Please read the  Medication Guide that was given to you and reviewed during todays visit.     (Tell all doctors including dentists that you are taking this medication)     Go to the emergency room or call 911 if:  -You have signs of allergic reaction:   -Rash, hives, itching.   -Swollen, blistered, peeling skin.   -Swelling of face, lips, mouth, tongue or throat.   -Tightness of chest, trouble breathing, swallowing or talking     Call your doctor:  - If IV / injection site gets red, warm, swollen, itchy or leaks fluid or pus.     (Leave dressing on your IV site for at least 2 hours and keep area clean and dry  - If you get sick or have symptoms of infection or are not feeling well for any reason.    (Wash your hands often, stay away from people who are sick)  - If you have side effects from your medication that do not go away or are bothersome.     (Refer to the teaching your nurse gave you for side effects to call your doctor about)    - Common side effects may include:  stuffy nose, headache, feeling tired, muscle aches, upset stomach  - Before receiving any vaccines     - Call the [facility] at   If:  - You get sick, are on antibiotics, have had a recent vaccine, have surgery or dental work and your doctor wants your visit rescheduled.  - You need to cancel and reschedule your visit for any reason. Call at least 2 days before your visit if you need to cancel.   - Your insurance changes before your next visit.    (We will need to get approval from your new insurance. This can take up to two weeks.)     The [facility] is opened Monday thru Friday. We are closed on weekends and holidays.   Voice mail will take your call if the center is closed. If you leave a message  please allow 24 hours for a call back during weekdays. If you leave a message on a weekend/holiday, we will call you back the next business day.    A pharmacist is available Monday - Friday from 8:30AM to 3:30PM to help answer any questions you may have about your prescriptions(s). Please call pharmacy at:    [facility]: [phone]  [facility]: [phone]  [facility]: [phone]

## 2025-05-19 NOTE — PROGRESS NOTES
[facility]   Infusion Clinic Note   Date: May 19, 2025   Name: Angeline Gurrola  : 1980   MRN: [id number]         Reason for Visit: OP Infusion (Consentyx 300 mg every 28 days)         Today: We administered secukinumab (Cosentyx) 300 mg in sodium chloride 0.9% 100 mL IVPB.       Ordered By: Mimi Raya MD       For a Diagnosis of: Ankylosing spondylitis, unspecified site of spine (Multi)       At today's visit patient accompanied by: Self      Today's Vitals:   Vitals:    25 1307 25 1425   BP: 119/56 (!) 103/48   Pulse: 65 56   Resp: 18 18   Temp: 36.8 °C (98.2 °F) 36.8 °C (98.3 °F)   TempSrc: Temporal    SpO2: 98% 100%   Weight: (!) 185 kg (407 lb)    PainSc:   8    PainLoc: Knee  Comment: right    LMP: 2025             Pre - Treatment Checklist:      - Previous reaction to current treatment: no      (Assess patient for the concerns below. Document provider notification as appropriate).  - Active or recent infection with/without current antibiotic use: no  - Recent or planned invasive dental work: no  - Recent or planned surgeries: no  - Recently received or plans to receive vaccinations: no  - Has treatment related toxicities: no  - Any chance may be pregnant:  no      Pain: 8   - Is the pain different from normal: no   - Is prescribing Doctor aware:  yes      Labs: Reviewed       Fall Risk Screening: Kraus Fall Risk  History of Falling, Immediate or Within 3 Months: Yes  Secondary Diagnosis: Yes  Ambulatory Aid: Walks without aid/bedrest/nurse assist  Intravenous Therapy/Heparin Lock: Yes  Gait/Transferring: Normal/bedrest/immobile  Mental Status: Oriented to own ability  Kraus Fall Risk Score: 60       Review Of Systems:  Review of Systems   Constitutional:  Positive for fatigue. Negative for appetite change, chills, fever and unexpected weight change.   HENT:   Negative for hearing loss, mouth sores, sore throat, tinnitus, trouble swallowing and voice  "change.    Eyes:  Negative for eye problems.   Respiratory:  Negative for cough, shortness of breath and wheezing.    Cardiovascular:  Negative for chest pain, leg swelling and palpitations.   Gastrointestinal:  Negative for abdominal pain, blood in stool, constipation, diarrhea, nausea and vomiting.   Genitourinary:  Negative for dysuria, frequency and hematuria.    Musculoskeletal:  Positive for arthralgias. Negative for myalgias.   Skin:  Negative for itching, rash and wound.   Neurological:  Positive for headaches. Negative for dizziness, extremity weakness, light-headedness and numbness.   Hematological:  Does not bruise/bleed easily.         Infusion Readiness:  - Assessment Concerns Related to Infusion: No  - Provider notified: n/a      New Patient Education:    N/A (returning patient for continuation of therapy. Ongoing education provided as needed.)        Treatment Conditions & Drug Specific Questions:    Secukinumab (COSENTYX)    (Unless otherwise specified on patient specific therapy plan):     TREATMENT CONDITIONS:  Unless otherwise specified on patient specific therapy plan HOLD and notify Provider prior to proceeding with today's infusion if patient with:  o Positive T-Spot  o Reactive Hep B and Hep C virus antibody  o Positive pregnancy prior to first treatment in women of childbearing ability    Lab Results   Component Value Date    TBSIN Negative 03/06/2024    QFG NEGATIVE 05/23/2018      Lab Results   Component Value Date    HEPBSAG Nonreactive 03/28/2024      No results found for: \"NONUHFIRE\", \"NONUHSWGH\", \"NONUHFISH\", \"EXTHEPBSAG\"  No results found for: \"HBCTI\", \"HEPBCAB\"  Lab Results   Component Value Date    HEPCAB Nonreactive 03/28/2024      Lab Results   Component Value Date    HEPCAB Nonreactive 03/28/2024       If available for review: CBC-D; CMP; CRP; LFT  Lab Results   Component Value Date    WBC 12.1 (H) 05/14/2025    HGB 13.5 05/14/2025    HCT 40.5 05/14/2025    MCV 93.8 05/14/2025    "  05/14/2025        Chemistry    Lab Results   Component Value Date/Time     05/14/2025 1154    K 4.7 05/14/2025 1154    CL 97 (L) 05/14/2025 1154    CO2 29 05/14/2025 1154    BUN 19 05/14/2025 1154    CREATININE 0.79 05/14/2025 1154    Lab Results   Component Value Date/Time    CALCIUM 9.6 05/14/2025 1154    ALKPHOS 113 05/14/2025 1154    AST 15 05/14/2025 1154    ALT 17 05/14/2025 1154    BILITOT 0.6 05/14/2025 1154           Lab Results   Component Value Date    CRP 1.81 (H) 03/06/2024      Lab Results   Component Value Date    ALT 17 05/14/2025    AST 15 05/14/2025    ALKPHOS 113 05/14/2025    BILITOT 0.6 05/14/2025        Patient meets treatment conditions? Yes    DRUG SPECIFIC QUESTIONS:  Up to date on all immunizations per patient report? Yes    Do you have a history of irritable bowel disease? No          (If yes educate patient that treatment may cause exacerbations and/or new onset of inflammatory bowel of inflammatory bowel disease)    Any new or recent diagnosis of cancer, specifically skin cancer?No          (If yes notify provider prior to proceeding)    -   Any new or worsening rashes / lesions? No  ( If YES notify provider piror to proceeding)    REMINDER:   WEIGHT BASED DRUG   PREGNANCY TEST PRIOR TO FIRST INFUSION FOR WOMEN OF CHILDBEARING ABILITY.    Recommended Vitals/Observation:  Vitals: Obtain vital signs at start and end of infusion, and as needed.  Observation: No observation.         Weight Based Drug Calculations:    WEIGHT BASED DRUGS: Secukinumab (COSENTYX)   Patient's dosing weight (kg): 208     10% weight variance for prescribed treatment: 187.2 kg to 228.8 kg     Patient's weight today:   Vitals:    05/19/25 1307   Weight: (!) 185 kg (407 lb)         weight range for prescribed dose:     Patient weight today falls outside of 10% variance or  weight range: Yes     Home Care pharmacist informed of weight variance: Yes    Doses that are weight based  have an acceptable variance rule within 10% of the prescribed   order and/or within  weight range. If patient weight on day of infusion falls   outside of the 10% variance, or weight range, infusion is administered and   pharmacy contacted regarding future dosing adjustments, per policy.      Post Treatment: Patient tolerated treatment without issue and was discharged in no apparent distress.      Note Authored / Patient Cared for By: Kameron Dean RN     1410 Line flushed with NS 50 ml when infusion bag completed.  _________________________________________________________________________    Note authored and patient cared for by: Kameron Dean RN   Note/Encounter reviewed by: Jany MONTAGUE NP. This provider on site at time of patient infusion. Infusion staff to notify this provider of any questions, concerns, abnormals or issues during infusion.    Final check of medication completed by this EDDI / or on-site pharmacist with administering nurse using positive identification prior to administration. Final appearance of product checked for accuracy and conformity to the formula of the prepared product. Assured use of correct ingredients, accurate calculations and precise measurements under appropriate conditions and procedures.    No issues reported during today's encounter. Pt. tolerated infusion without difficulty. Pt. not independently evaluated by this provider during today's encounter.  (Jany MONTAGUE NP)

## 2025-05-28 ENCOUNTER — HOSPITAL ENCOUNTER (OUTPATIENT)
Dept: RADIOLOGY | Facility: CLINIC | Age: 45
Discharge: HOME | End: 2025-05-28
Payer: COMMERCIAL

## 2025-05-28 ENCOUNTER — APPOINTMENT (OUTPATIENT)
Dept: ORTHOPEDIC SURGERY | Facility: CLINIC | Age: 45
End: 2025-05-28
Payer: COMMERCIAL

## 2025-05-28 DIAGNOSIS — M25.561 PAIN IN BOTH KNEES, UNSPECIFIED CHRONICITY: ICD-10-CM

## 2025-05-28 DIAGNOSIS — M25.562 PAIN IN BOTH KNEES, UNSPECIFIED CHRONICITY: ICD-10-CM

## 2025-05-28 DIAGNOSIS — M17.0 ARTHRITIS OF BOTH KNEES: ICD-10-CM

## 2025-05-28 PROCEDURE — 1036F TOBACCO NON-USER: CPT | Performed by: ORTHOPAEDIC SURGERY

## 2025-05-28 PROCEDURE — 99214 OFFICE O/P EST MOD 30 MIN: CPT | Performed by: ORTHOPAEDIC SURGERY

## 2025-05-28 PROCEDURE — 73564 X-RAY EXAM KNEE 4 OR MORE: CPT | Mod: BILATERAL PROCEDURE | Performed by: RADIOLOGY

## 2025-05-28 PROCEDURE — 73564 X-RAY EXAM KNEE 4 OR MORE: CPT | Mod: 50

## 2025-05-28 RX ORDER — HYALURONATE SODIUM, STABILIZED 60 MG/3 ML
SYRINGE (ML) INTRAARTICULAR
Qty: 6 ML | Refills: 0 | Status: SHIPPED | OUTPATIENT
Start: 2025-05-28

## 2025-05-28 ASSESSMENT — PAIN - FUNCTIONAL ASSESSMENT: PAIN_FUNCTIONAL_ASSESSMENT: 0-10

## 2025-05-28 ASSESSMENT — PAIN SCALES - GENERAL: PAINLEVEL_OUTOF10: 8

## 2025-05-28 NOTE — PROGRESS NOTES
F  This is a consultation from Dr. Richard Dailey DO for   Chief Complaint   Patient presents with    Right Knee - Pain    Left Knee - Pain       This is a 45 y.o. female who presents for ollow-up for bilateral knees.  Patient has bilateral knee arthritis she had injections about 4 weeks ago.  They were helpful and significantly reduced her symptoms especially on the right.  Ellipta did not work as well.  She has had worsening of the pain in the last few days on both sides, exacerbation of her pain.  No numbness or tingling no fevers or chills.  She is having a lot of pain and it is bothering her quite a bit.  She is still working on weight loss.    Physical Exam    There has been no interval change in this patient's past medical, surgical, medications, allergies, family history or social history since the most recent visit to a provider within our department. 14 point review of systems was performed, reviewed, and negative except for pertinent positives documented in the history of present illness.     Constitutional: well developed, well nourished female in no acute distress  Psychiatric: normal mood, appropriate affect  Eyes: sclera anicteric  HENT: normocephalic/atraumatic  CV: regular rate and rhythm   Respiratory: non labored breathing  Integumentary: no rash  Neurological: moves all extremities    Bilateral knee exam: skin intact no lacerations or abrations. no effusion.  Tender lateral joint line. negative log roll negative patellar grind. ROM 0-120. stable to varus and valgus stress at 0 and 30 degrees. negative lachman negative posterior drawer negative tim. 5/5 ehl/fhl/gs/ta. silt s/s/sp/dp/t. 2+ dp/pt      Procedures      Impression/Plan: This is a 45 y.o. female with bilateral knee arthritis.  I had an in depth discussion with the patient regarding treatment options for arthritis and their relative risks and benefits. We reviewed surgical and nonsurgical option for treatment. Treatments include anti  "inflammatory medications, physical therapy, weight loss, activity modification, use of assistive devices, injection therapies. We discussed current prescriptions and risks and benefits of continuation of prescription medication as apporpriate. We discussed that arthritis is often progressive over time, an in end stage arthritis surgical interventions can be considered, including arthroplasty. All questions were answered and the patient voiced their understanding.  Will get her set up with hyaluronic acid injection.  We discussed the risk benefits and alternatives of this prescription medication which was prescribed today.  We discussed weight loss and BMI goals for possible future arthroplasty.    BMI Readings from Last 1 Encounters:   05/19/25 61.88 kg/m²      Lab Results   Component Value Date    CREATININE 0.79 05/14/2025     Tobacco Use: Low Risk  (5/28/2025)    Patient History     Smoking Tobacco Use: Never     Smokeless Tobacco Use: Never     Passive Exposure: Not on file      Computed MELD 3.0 unavailable. One or more values for this score either were not found within the given timeframe or did not fit some other criterion.  Computed MELD-Na unavailable. One or more values for this score either were not found within the given timeframe or did not fit some other criterion.       Lab Results   Component Value Date    HGBA1C 5.5 05/17/2024     No results found for: \"STAPHMRSASCR\"  "

## 2025-06-02 ENCOUNTER — HOSPITAL ENCOUNTER (OUTPATIENT)
Dept: RADIOLOGY | Facility: CLINIC | Age: 45
Discharge: HOME | End: 2025-06-02
Payer: COMMERCIAL

## 2025-06-02 DIAGNOSIS — H53.8 BLURRED VISION, RIGHT EYE: ICD-10-CM

## 2025-06-02 DIAGNOSIS — G44.89 OTHER HEADACHE SYNDROME: ICD-10-CM

## 2025-06-02 DIAGNOSIS — D32.9 MENINGIOMA (MULTI): ICD-10-CM

## 2025-06-02 DIAGNOSIS — G93.5 CHIARI I MALFORMATION (MULTI): ICD-10-CM

## 2025-06-02 DIAGNOSIS — R51.9 FACIAL PAIN: ICD-10-CM

## 2025-06-02 PROCEDURE — 70553 MRI BRAIN STEM W/O & W/DYE: CPT

## 2025-06-02 PROCEDURE — 70553 MRI BRAIN STEM W/O & W/DYE: CPT | Performed by: RADIOLOGY

## 2025-06-02 PROCEDURE — 2550000001 HC RX 255 CONTRASTS: Performed by: FAMILY MEDICINE

## 2025-06-02 PROCEDURE — A9575 INJ GADOTERATE MEGLUMI 0.1ML: HCPCS | Performed by: FAMILY MEDICINE

## 2025-06-02 RX ORDER — GADOTERATE MEGLUMINE 376.9 MG/ML
20 INJECTION INTRAVENOUS
Status: COMPLETED | OUTPATIENT
Start: 2025-06-02 | End: 2025-06-02

## 2025-06-02 RX ADMIN — GADOTERATE MEGLUMINE 20 ML: 376.9 INJECTION INTRAVENOUS at 12:01

## 2025-06-04 ENCOUNTER — TELEPHONE (OUTPATIENT)
Dept: PRIMARY CARE | Facility: CLINIC | Age: 45
End: 2025-06-04
Payer: COMMERCIAL

## 2025-06-04 DIAGNOSIS — H53.8 BLURRED VISION, RIGHT EYE: ICD-10-CM

## 2025-06-04 DIAGNOSIS — G44.89 OTHER HEADACHE SYNDROME: ICD-10-CM

## 2025-06-04 DIAGNOSIS — R51.9 FACIAL PAIN: ICD-10-CM

## 2025-06-10 ENCOUNTER — APPOINTMENT (OUTPATIENT)
Dept: RHEUMATOLOGY | Facility: CLINIC | Age: 45
End: 2025-06-10
Payer: COMMERCIAL

## 2025-06-10 VITALS
DIASTOLIC BLOOD PRESSURE: 61 MMHG | SYSTOLIC BLOOD PRESSURE: 101 MMHG | WEIGHT: 293 LBS | BODY MASS INDEX: 63.53 KG/M2 | OXYGEN SATURATION: 96 % | HEART RATE: 65 BPM

## 2025-06-10 DIAGNOSIS — M54.12 CERVICAL RADICULOPATHY, CHRONIC: Primary | ICD-10-CM

## 2025-06-10 DIAGNOSIS — Z79.899 ENCOUNTER FOR LONG-TERM (CURRENT) USE OF MEDICATIONS: ICD-10-CM

## 2025-06-10 DIAGNOSIS — M45.9 ANKYLOSING SPONDYLITIS, UNSPECIFIED SITE OF SPINE (MULTI): ICD-10-CM

## 2025-06-10 DIAGNOSIS — G56.22 ULNAR NEUROPATHY AT ELBOW, LEFT: ICD-10-CM

## 2025-06-10 PROCEDURE — 99214 OFFICE O/P EST MOD 30 MIN: CPT | Performed by: INTERNAL MEDICINE

## 2025-06-10 ASSESSMENT — PAIN SCALES - GENERAL: PAINLEVEL_OUTOF10: 8

## 2025-06-10 ASSESSMENT — ENCOUNTER SYMPTOMS
LOSS OF SENSATION IN FEET: 0
OCCASIONAL FEELINGS OF UNSTEADINESS: 0
BACK PAIN: 1
DEPRESSION: 0
FATIGUE: 1

## 2025-06-10 NOTE — PROGRESS NOTES
Subjective Patient ID: Angeline Gurrola is a 45 y.o. female who presents for Follow-up.  HPI  Patient has history of ankylosing spondylitis with +HLA-b27.      Her last visit in January was a telehealth visit and I was unable to physically evaluate her.  Unfortunately at that time she had cellulitis of the right thigh and also had been positive for COVID in January.  She has had continued issues with her knee and has been seeing orthopedics.  She will be going to pain management to see if they can do anything for her knee.  She also been having some issues with her cervical spine as well.  She does feel that the Cosentyx infusion is helping with her lower back.  Having numbness going down her left upper extremity.  Uncertain if it is coming from her neck or somewhere else.  Review of Systems   Constitutional:  Positive for fatigue.   HENT:  Positive for congestion and postnasal drip.    Cardiovascular:  Positive for leg swelling.   Musculoskeletal:  Positive for back pain and gait problem.       Objective   Physical Exam  GEN: NAD A&O x3 appropriate affect  HENT: no enlarged glands or thyroid  EYES: no conjunctival redness, eyelids normal  LYMPH: no cervical lymphadenopathy  CV: Lymphedema  SKIN: Some mild dryness on her palms  PULSES: +radials  TENDER POINTS: 9/18   MSK:  Edema lower extremities more in the left than the right  Pain with flexion and internal/external rotation of the left hip  No swelling in her DIP PIP MCP wrist elbows shoulders    Assessment/Plan     Positive HLA-B27 ankylosing spondylitis previously has been on methotrexate, Humira, infliximab,  Taltz   -Has had benefit with IV Cosentyx and that she has had decrease in aches, stiffness and low back pain.  Will have her continue with this line of treatment.     - She is having issues with her cervical spine and we will send her to physical therapy.  She will be seeing pain management concerning her chronic knee pain.  She has been seeing  orthopedics  Also having issues of ulnar neuropathyon the left-  Reviewed her recent blood work    Will have her come back in 6 months or as needed         Mimi Raya MD 06/10/25 3:03 PM

## 2025-06-16 ENCOUNTER — APPOINTMENT (OUTPATIENT)
Dept: INFUSION THERAPY | Facility: CLINIC | Age: 45
End: 2025-06-16
Payer: COMMERCIAL

## 2025-06-18 ENCOUNTER — APPOINTMENT (OUTPATIENT)
Dept: PAIN MEDICINE | Facility: CLINIC | Age: 45
End: 2025-06-18
Payer: COMMERCIAL

## 2025-06-18 VITALS
WEIGHT: 293 LBS | BODY MASS INDEX: 44.41 KG/M2 | DIASTOLIC BLOOD PRESSURE: 83 MMHG | SYSTOLIC BLOOD PRESSURE: 126 MMHG | HEIGHT: 68 IN | OXYGEN SATURATION: 97 % | HEART RATE: 65 BPM

## 2025-06-18 DIAGNOSIS — J45.909 UNCOMPLICATED ASTHMA, UNSPECIFIED ASTHMA SEVERITY, UNSPECIFIED WHETHER PERSISTENT (HHS-HCC): ICD-10-CM

## 2025-06-18 DIAGNOSIS — M17.32 POST-TRAUMATIC OSTEOARTHRITIS OF LEFT KNEE: ICD-10-CM

## 2025-06-18 DIAGNOSIS — M17.0 ARTHRITIS OF BOTH KNEES: ICD-10-CM

## 2025-06-18 DIAGNOSIS — M17.0 PRIMARY OSTEOARTHRITIS OF BOTH KNEES: Primary | ICD-10-CM

## 2025-06-18 PROCEDURE — 99204 OFFICE O/P NEW MOD 45 MIN: CPT | Performed by: PHYSICAL MEDICINE & REHABILITATION

## 2025-06-18 PROCEDURE — 1036F TOBACCO NON-USER: CPT | Performed by: PHYSICAL MEDICINE & REHABILITATION

## 2025-06-18 PROCEDURE — 3008F BODY MASS INDEX DOCD: CPT | Performed by: PHYSICAL MEDICINE & REHABILITATION

## 2025-06-18 NOTE — PROGRESS NOTES
Chief complaint  Pain in both knee     Osiris VALERIO LPN,   was present during the entire history and physical examination    History  Angeline Gurrola is referred for pain management by Dr Bridger Jeffers  Pain in both knees since 2007.  Worse on the left 7 months ago. The symptoms appeared spontaneously without history intervening trauma or falls.   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 In the past those helped  The pain is interfering with activities of daily living, quality of life and quality of sleep. It is limiting the functions and everything takes longer to complete because of the slowing related to the pain. Movements are cautious to avoid aggravation of the symptoms.   Pain is knees and around them mainly below knees.  With pain  aggravation  uses cane / walker and hold on to furniture  Bc of pain and limitation she lost job of 25 years in [employer]  The pain at rest is about 6 or 7/10 with aggravation it was up to 8 or 9/10    Review of Systems :  Denied any fever or chills. No weight loss and no night sweats. No cough or sputum production. No diarrhea   The constipation has been responding to fibers and over the counter medications.     No bladder and bowel incontinence and no other changes in bladder and bowel. No skin changes.  Reports tiredness and fatigability only if the pain is not controlled.     I further Asked about symptoms or changes affecting the vision, hearing, breathing, digestive system, urinary symptoms, skin, other musculoskeletal condition, neurological conditions these are negative except as detailed in the history and physical examination above    Please see further detailed information scanned in the chart    Physical examination  Awake, alert and oriented for time place and persons   Pupils  are equal and reactive to light and accommodation    The left knee showed tenderness over the anterior knee and lateral knee around the joint line no overt instability.  Positive Markos.  I could not detect if there is effusion or swelling in the knee because of the mass of the soft tissue.  Similar finding to the lesser degree on the right side.  She is walking with severe limping on both sides no cane no assistive devices     Diagnosis  Problem List Items Addressed This Visit       Uncomplicated asthma, unspecified asthma severity, unspecified whether persistent (HHS-HCC)    Arthritis of both knees - Primary    Post-traumatic osteoarthritis of left knee    Relevant Orders    US guided pain procedure    Stimulator Lead Trial        Plan  Reviewed the pain generators.  Went over the types of pain with neuropathic and nociceptive and different pathologies and therapeutic modalities. Discussed the mechanism of action of interventions from acupuncture, physical therapy , regular exercises, injections, botox, spinal cord stimulation, and role of surgery     Went over pathology of the knee osteoarthritis right over the x-rays with her she does have advanced osteoarthritis in both knees appears to be a little bit worse on the left side discussed with her that the worsening of the pain on the left side could be related to microfracture on the bone she is technically bone-on-bone but the tibial plateau appeared to be normal affected than the condyle      Discussed about NSAIDS and I explained about the opioids sparing effect to allow keeping the opioids dose at minimal effective dose.   I went over the potential side effects of the NSAIDS on the gastrointestinal, renal and cardiovascular systems.       Focus of Today's Visit :  Dw her multiple option including RFA but due to risk of charcot knee and worsening of the anatomy consider peripheral nerve stimulator first, went over the mechanism of action and will check with  insurance  Is not improved consider genicular nerve blocks and RFA  Avoid Nsaids and opioid therapy  Continue with efforts for weight control and surgery and goal to 275 lbs to be able to have TKR        Follow-up after the above or earlier if needed     The level of clinical decision making in this office visit,  is high, given the high risks of complications with the morbidity and mortality due to the fact that acute and chronic pain may pose a threat to life and bodily function, if under treated, poorly treated, or with failure to maintain adequate treatment and timely medical follow up. Additionally over treatment has its own set of complications including overdosing on the pain medications and also the habit forming potentials with the use of the medications used to treat chronic painful conditions including therapeutic classes classified as dangerous medications. Given the serious and fluctuating nature of pain level and instensity with extensive consideration for whenever pain changes, there is always the risk of prolonged functional impairment requiring close patient monitoring with regular assessments and reassessments and high level medical decision making at every office visit. The amount and complexity of data reviewed is high given the patient clinical presentation, labs,  data, radiology reports, and other tests as discussed during office visits. Pertinent data whether positive or negative were taken in consideration in the process of making this high level medical decision.

## 2025-06-22 DIAGNOSIS — M45.9 ANKYLOSING SPONDYLITIS, UNSPECIFIED SITE OF SPINE (MULTI): ICD-10-CM

## 2025-06-24 ENCOUNTER — APPOINTMENT (OUTPATIENT)
Dept: INFUSION THERAPY | Facility: CLINIC | Age: 45
End: 2025-06-24
Payer: COMMERCIAL

## 2025-06-24 VITALS
SYSTOLIC BLOOD PRESSURE: 120 MMHG | RESPIRATION RATE: 18 BRPM | DIASTOLIC BLOOD PRESSURE: 60 MMHG | WEIGHT: 293 LBS | HEART RATE: 58 BPM | BODY MASS INDEX: 62.74 KG/M2 | TEMPERATURE: 98 F | OXYGEN SATURATION: 100 %

## 2025-06-24 DIAGNOSIS — M45.9 ANKYLOSING SPONDYLITIS, UNSPECIFIED SITE OF SPINE (MULTI): ICD-10-CM

## 2025-06-24 LAB
ALBUMIN SERPL BCP-MCNC: 3.8 G/DL (ref 3.4–5)
ALP SERPL-CCNC: 107 U/L (ref 33–110)
ALT SERPL W P-5'-P-CCNC: 23 U/L (ref 7–45)
ANION GAP SERPL CALCULATED.3IONS-SCNC: 11 MMOL/L (ref 10–20)
AST SERPL W P-5'-P-CCNC: 19 U/L (ref 9–39)
BASOPHILS # BLD AUTO: 0.04 X10*3/UL (ref 0–0.1)
BASOPHILS NFR BLD AUTO: 0.5 %
BILIRUB SERPL-MCNC: 0.3 MG/DL (ref 0–1.2)
BUN SERPL-MCNC: 14 MG/DL (ref 6–23)
CALCIUM SERPL-MCNC: 8.8 MG/DL (ref 8.6–10.3)
CHLORIDE SERPL-SCNC: 102 MMOL/L (ref 98–107)
CO2 SERPL-SCNC: 27 MMOL/L (ref 21–32)
CREAT SERPL-MCNC: 0.73 MG/DL (ref 0.5–1.05)
EGFRCR SERPLBLD CKD-EPI 2021: >90 ML/MIN/1.73M*2
EOSINOPHIL # BLD AUTO: 0.43 X10*3/UL (ref 0–0.7)
EOSINOPHIL NFR BLD AUTO: 5 %
ERYTHROCYTE [DISTWIDTH] IN BLOOD BY AUTOMATED COUNT: 13.3 % (ref 11.5–14.5)
GLUCOSE SERPL-MCNC: 118 MG/DL (ref 74–99)
HCT VFR BLD AUTO: 36.8 % (ref 36–46)
HGB BLD-MCNC: 11.9 G/DL (ref 12–16)
IMM GRANULOCYTES # BLD AUTO: 0.04 X10*3/UL (ref 0–0.7)
IMM GRANULOCYTES NFR BLD AUTO: 0.5 % (ref 0–0.9)
LYMPHOCYTES # BLD AUTO: 2.2 X10*3/UL (ref 1.2–4.8)
LYMPHOCYTES NFR BLD AUTO: 25.4 %
MCH RBC QN AUTO: 30.1 PG (ref 26–34)
MCHC RBC AUTO-ENTMCNC: 32.3 G/DL (ref 32–36)
MCV RBC AUTO: 93 FL (ref 80–100)
MONOCYTES # BLD AUTO: 0.55 X10*3/UL (ref 0.1–1)
MONOCYTES NFR BLD AUTO: 6.4 %
NEUTROPHILS # BLD AUTO: 5.39 X10*3/UL (ref 1.2–7.7)
NEUTROPHILS NFR BLD AUTO: 62.2 %
NRBC BLD-RTO: 0 /100 WBCS (ref 0–0)
PLATELET # BLD AUTO: 264 X10*3/UL (ref 150–450)
POTASSIUM SERPL-SCNC: 4.1 MMOL/L (ref 3.5–5.3)
PROT SERPL-MCNC: 5.8 G/DL (ref 6.4–8.2)
RBC # BLD AUTO: 3.95 X10*6/UL (ref 4–5.2)
SODIUM SERPL-SCNC: 136 MMOL/L (ref 136–145)
WBC # BLD AUTO: 8.7 X10*3/UL (ref 4.4–11.3)

## 2025-06-24 PROCEDURE — 85025 COMPLETE CBC W/AUTO DIFF WBC: CPT

## 2025-06-24 PROCEDURE — 80053 COMPREHEN METABOLIC PANEL: CPT

## 2025-06-24 PROCEDURE — 96365 THER/PROPH/DIAG IV INF INIT: CPT | Performed by: NURSE PRACTITIONER

## 2025-06-24 RX ORDER — FAMOTIDINE 10 MG/ML
20 INJECTION, SOLUTION INTRAVENOUS ONCE AS NEEDED
OUTPATIENT
Start: 2025-07-14

## 2025-06-24 RX ORDER — DIPHENHYDRAMINE HYDROCHLORIDE 50 MG/ML
50 INJECTION, SOLUTION INTRAMUSCULAR; INTRAVENOUS AS NEEDED
Status: DISCONTINUED | OUTPATIENT
Start: 2025-06-24 | End: 2025-06-24 | Stop reason: HOSPADM

## 2025-06-24 RX ORDER — EPINEPHRINE 0.3 MG/.3ML
0.3 INJECTION SUBCUTANEOUS EVERY 5 MIN PRN
OUTPATIENT
Start: 2025-07-14

## 2025-06-24 RX ORDER — FAMOTIDINE 10 MG/ML
20 INJECTION, SOLUTION INTRAVENOUS ONCE AS NEEDED
Status: DISCONTINUED | OUTPATIENT
Start: 2025-06-24 | End: 2025-06-24 | Stop reason: HOSPADM

## 2025-06-24 RX ORDER — DIPHENHYDRAMINE HYDROCHLORIDE 50 MG/ML
50 INJECTION, SOLUTION INTRAMUSCULAR; INTRAVENOUS AS NEEDED
OUTPATIENT
Start: 2025-07-14

## 2025-06-24 RX ORDER — ALBUTEROL SULFATE 0.83 MG/ML
3 SOLUTION RESPIRATORY (INHALATION) AS NEEDED
OUTPATIENT
Start: 2025-07-14

## 2025-06-24 RX ORDER — ALBUTEROL SULFATE 0.83 MG/ML
3 SOLUTION RESPIRATORY (INHALATION) AS NEEDED
Status: DISCONTINUED | OUTPATIENT
Start: 2025-06-24 | End: 2025-06-24 | Stop reason: HOSPADM

## 2025-06-24 RX ORDER — EPINEPHRINE 0.3 MG/.3ML
0.3 INJECTION SUBCUTANEOUS EVERY 5 MIN PRN
Status: DISCONTINUED | OUTPATIENT
Start: 2025-06-24 | End: 2025-06-24 | Stop reason: HOSPADM

## 2025-06-24 ASSESSMENT — ENCOUNTER SYMPTOMS
VOMITING: 0
HEADACHES: 0
WOUND: 0
ABDOMINAL PAIN: 0
NUMBNESS: 0
ARTHRALGIAS: 1
MYALGIAS: 1
EYE PROBLEMS: 0
SHORTNESS OF BREATH: 0
CHILLS: 0
FREQUENCY: 0
LIGHT-HEADEDNESS: 0
DYSURIA: 0
FATIGUE: 0
EXTREMITY WEAKNESS: 0
DIZZINESS: 0
CONSTIPATION: 0
BRUISES/BLEEDS EASILY: 0
DIARRHEA: 0
APPETITE CHANGE: 0
NAUSEA: 0
LEG SWELLING: 1
COUGH: 0
HEMATURIA: 0
FEVER: 0

## 2025-06-24 NOTE — PROGRESS NOTES
[facility]   Infusion Clinic Note   Date: 2025   Name: Angeline Gurrola  : 1980   MRN: [id number]         Reason for Visit: OP Infusion (Cosentyx 300 mg every 28 days)         Today: We administered secukinumab (Cosentyx) 300 mg in sodium chloride 0.9% 100 mL IVPB.       Ordered By: Mimi Raya MD       For a Diagnosis of: Ankylosing spondylitis, unspecified site of spine (Multi)       At today's visit patient accompanied by: Self      Today's Vitals:   Vitals:    25 1318 25 1426 25 1440   BP: 135/63 (!) 96/49 120/60   Pulse: 59 54 58   Resp: 18 18    Temp: 36.4 °C (97.6 °F) 36.7 °C (98 °F)    SpO2: 97% 100%    Weight: (!) 187 kg (412 lb 9.6 oz)     LMP: 06/10/2025             Pre - Treatment Checklist:      - Previous reaction to current treatment: no      (Assess patient for the concerns below. Document provider notification as appropriate).  - Active or recent infection with/without current antibiotic use: no  - Recent or planned invasive dental work: no  - Recent or planned surgeries: no  - Recently received or plans to receive vaccinations: no  - Has treatment related toxicities: no  - Any chance may be pregnant:  no LMP 6/10/2025 waiver signed      Pain: 9   - Is the pain different from normal: no   - Is prescribing Doctor aware:  yes      Labs: Labs drawn and sent per order      Fall Risk Screening: Kraus Fall Risk  History of Falling, Immediate or Within 3 Months: Yes  Secondary Diagnosis: Yes  Ambulatory Aid: Walks without aid/bedrest/nurse assist  Intravenous Therapy/Heparin Lock: Yes  Gait/Transferring: Normal/bedrest/immobile  Mental Status: Oriented to own ability  Kraus Fall Risk Score: 60       Review Of Systems:  Review of Systems   Constitutional:  Negative for appetite change, chills, fatigue and fever.   HENT:   Negative for hearing loss and mouth sores.    Eyes:  Negative for eye problems.   Respiratory:  Negative for cough and  "shortness of breath.    Cardiovascular:  Positive for leg swelling. Negative for chest pain.   Gastrointestinal:  Negative for abdominal pain, constipation, diarrhea, nausea and vomiting.   Genitourinary:  Positive for bladder incontinence. Negative for dysuria, frequency and hematuria.    Musculoskeletal:  Positive for arthralgias and myalgias.        Chronic bilateral knee pain   Skin:  Negative for itching, rash and wound.   Neurological:  Negative for dizziness, extremity weakness, headaches, light-headedness and numbness.   Hematological:  Does not bruise/bleed easily.         Infusion Readiness:  - Assessment Concerns Related to Infusion: No  - Provider notified: no      New Patient Education:    N/A (returning patient for continuation of therapy. Ongoing education provided as needed.)        Treatment Conditions & Drug Specific Questions:    Secukinumab (COSENTYX)    (Unless otherwise specified on patient specific therapy plan):     TREATMENT CONDITIONS:  Unless otherwise specified on patient specific therapy plan HOLD and notify Provider prior to proceeding with today's infusion if patient with:  o Positive T-Spot  o Reactive Hep B and Hep C virus antibody  o Positive pregnancy prior to first treatment in women of childbearing ability    Lab Results   Component Value Date    TBSIN Negative 03/06/2024    QFG NEGATIVE 05/23/2018      Lab Results   Component Value Date    HEPBSAG Nonreactive 03/28/2024      No results found for: \"NONUHFIRE\", \"NONUHSWGH\", \"NONUHFISH\"  No results found for: \"HBCTI\", \"HEPBCAB\"  Lab Results   Component Value Date    HEPCAB Nonreactive 03/28/2024      Lab Results   Component Value Date    HEPCAB Nonreactive 03/28/2024       If available for review: CBC-D; CMP; CRP; LFT  Lab Results   Component Value Date    WBC 8.7 06/24/2025    HGB 11.9 (L) 06/24/2025    HCT 36.8 06/24/2025    MCV 93 06/24/2025     06/24/2025        Chemistry    Lab Results   Component Value Date/Time    NA " 136 06/24/2025 1323     05/14/2025 1154    K 4.1 06/24/2025 1323    K 4.7 05/14/2025 1154     06/24/2025 1323    CL 97 (L) 05/14/2025 1154    CO2 27 06/24/2025 1323    CO2 29 05/14/2025 1154    BUN 14 06/24/2025 1323    BUN 19 05/14/2025 1154    CREATININE 0.73 06/24/2025 1323    CREATININE 0.79 05/14/2025 1154    Lab Results   Component Value Date/Time    CALCIUM 8.8 06/24/2025 1323    CALCIUM 9.6 05/14/2025 1154    ALKPHOS 107 06/24/2025 1323    ALKPHOS 113 05/14/2025 1154    AST 19 06/24/2025 1323    AST 15 05/14/2025 1154    ALT 23 06/24/2025 1323    ALT 17 05/14/2025 1154    BILITOT 0.3 06/24/2025 1323    BILITOT 0.6 05/14/2025 1154           Lab Results   Component Value Date    CRP 1.81 (H) 03/06/2024      Lab Results   Component Value Date    ALT 23 06/24/2025    AST 19 06/24/2025    ALKPHOS 107 06/24/2025    BILITOT 0.3 06/24/2025        Patient meets treatment conditions? Yes    DRUG SPECIFIC QUESTIONS:  Up to date on all immunizations per patient report? Yes    Do you have a history of irritable bowel disease? No          (If yes educate patient that treatment may cause exacerbations and/or new onset of inflammatory bowel of inflammatory bowel disease)    Any new or recent diagnosis of cancer, specifically skin cancer?No          (If yes notify provider prior to proceeding)    -   Any new or worsening rashes / lesions? No  ( If YES notify provider piror to proceeding)    REMINDER:   WEIGHT BASED DRUG   PREGNANCY TEST PRIOR TO FIRST INFUSION FOR WOMEN OF CHILDBEARING ABILITY.    Recommended Vitals/Observation:  Vitals: Obtain vital signs at start and end of infusion, and as needed.  Observation: No observation.         Weight Based Drug Calculations:    WEIGHT BASED DRUGS: Secukinumab (COSENTYX)   Patient's dosing weight (kg): 208     10% weight variance for prescribed treatment: 187.0kg to 228.8 kg     Patient's weight today:   Vitals:    06/24/25 1318   Weight: (!) 187 kg (412 lb 9.6 oz)          weight range for prescribed dose:     Patient weight today falls outside of 10% variance or  weight range: No     Home Care pharmacist informed of weight variance: No    Doses that are weight based have an acceptable variance rule within 10% of the prescribed   order and/or within  weight range. If patient weight on day of infusion falls   outside of the 10% variance, or weight range, infusion is administered and   pharmacy contacted regarding future dosing adjustments, per policy.      Post Treatment: Patient tolerated treatment without issue and was discharged in no apparent distress.   RTC on 7/22/2025.      Note Authored / Patient Cared for By: Jennifer Baltazar RN   _________________________________________________________________________    Note authored and patient cared for by: Jennifer Baltazar RN   Note/Encounter reviewed by: Jany MONTAGUE NP. This provider on site at time of patient infusion. Infusion staff to notify this provider of any questions, concerns, abnormals or issues during infusion.    Final check of medication completed by this EDDI / or on-site pharmacist with administering nurse using positive identification prior to administration. Final appearance of product checked for accuracy and conformity to the formula of the prepared product. Assured use of correct ingredients, accurate calculations and precise measurements under appropriate conditions and procedures.    No issues reported during today's encounter. Pt. tolerated infusion without difficulty. Pt. not independently evaluated by this provider during today's encounter.  (Jany MONTAGUE NP)

## 2025-06-24 NOTE — PATIENT INSTRUCTIONS
Today :Angeline Gurrola had no medications administered during this visit.     For:   1. Ankylosing spondylitis, unspecified site of spine (Multi)         Your next appointment is due in:  7/22/2025        Please read the  Medication Guide that was given to you and reviewed during todays visit.     (Tell all doctors including dentists that you are taking this medication)     Go to the emergency room or call 911 if:  -You have signs of allergic reaction:   -Rash, hives, itching.   -Swollen, blistered, peeling skin.   -Swelling of face, lips, mouth, tongue or throat.   -Tightness of chest, trouble breathing, swallowing or talking     Call your doctor:  - If IV / injection site gets red, warm, swollen, itchy or leaks fluid or pus.     (Leave dressing on your IV site for at least 2 hours and keep area clean and dry  - If you get sick or have symptoms of infection or are not feeling well for any reason.    (Wash your hands often, stay away from people who are sick)  - If you have side effects from your medication that do not go away or are bothersome.     (Refer to the teaching your nurse gave you for side effects to call your doctor about)    - Common side effects may include:  stuffy nose, headache, feeling tired, muscle aches, upset stomach  - Before receiving any vaccines     - Call [facility] at   If:  - You get sick, are on antibiotics, have had a recent vaccine, have surgery or dental work and your doctor wants your visit rescheduled.  - You need to cancel and reschedule your visit for any reason. Call at least 2 days before your visit if you need to cancel.   - Your insurance changes before your next visit.    (We will need to get approval from your new insurance. This can take up to two weeks.)     [facility] is opened Monday thru Friday. We are closed on weekends and holidays.   Voice mail will take your call if the center is closed. If you leave a message please allow 24  hours for a call back during weekdays. If you leave a message on a weekend/holiday, we will call you back the next business day.    A pharmacist is available Monday - Friday from 8:30AM to 3:30PM to help answer any questions you may have about your prescriptions(s). Please call pharmacy at:    [facility]: [phone]  [facility]: [phone]  [facility]: [phone]

## 2025-07-08 DIAGNOSIS — Z12.31 ENCOUNTER FOR SCREENING MAMMOGRAM FOR BREAST CANCER: ICD-10-CM

## 2025-07-10 ENCOUNTER — APPOINTMENT (OUTPATIENT)
Dept: RADIOLOGY | Facility: CLINIC | Age: 45
End: 2025-07-10
Payer: COMMERCIAL

## 2025-07-10 DIAGNOSIS — M54.2 CERVICALGIA: ICD-10-CM

## 2025-07-10 PROCEDURE — 72141 MRI NECK SPINE W/O DYE: CPT

## 2025-07-14 ENCOUNTER — APPOINTMENT (OUTPATIENT)
Dept: INFUSION THERAPY | Facility: CLINIC | Age: 45
End: 2025-07-14
Payer: COMMERCIAL

## 2025-07-22 ENCOUNTER — APPOINTMENT (OUTPATIENT)
Dept: INFUSION THERAPY | Facility: CLINIC | Age: 45
End: 2025-07-22
Payer: COMMERCIAL

## 2025-07-22 VITALS
RESPIRATION RATE: 18 BRPM | TEMPERATURE: 97.2 F | OXYGEN SATURATION: 97 % | BODY MASS INDEX: 61.91 KG/M2 | WEIGHT: 293 LBS | HEART RATE: 66 BPM | DIASTOLIC BLOOD PRESSURE: 67 MMHG | SYSTOLIC BLOOD PRESSURE: 98 MMHG

## 2025-07-22 DIAGNOSIS — M45.9 ANKYLOSING SPONDYLITIS, UNSPECIFIED SITE OF SPINE (MULTI): ICD-10-CM

## 2025-07-22 PROCEDURE — 96413 CHEMO IV INFUSION 1 HR: CPT | Performed by: NURSE PRACTITIONER

## 2025-07-22 RX ORDER — DIPHENHYDRAMINE HYDROCHLORIDE 50 MG/ML
50 INJECTION, SOLUTION INTRAMUSCULAR; INTRAVENOUS AS NEEDED
OUTPATIENT
Start: 2025-08-19

## 2025-07-22 RX ORDER — FAMOTIDINE 10 MG/ML
20 INJECTION, SOLUTION INTRAVENOUS ONCE AS NEEDED
OUTPATIENT
Start: 2025-08-19

## 2025-07-22 RX ORDER — EPINEPHRINE 0.3 MG/.3ML
0.3 INJECTION SUBCUTANEOUS EVERY 5 MIN PRN
OUTPATIENT
Start: 2025-08-19

## 2025-07-22 RX ORDER — ALBUTEROL SULFATE 0.83 MG/ML
3 SOLUTION RESPIRATORY (INHALATION) AS NEEDED
OUTPATIENT
Start: 2025-08-19

## 2025-07-22 ASSESSMENT — ENCOUNTER SYMPTOMS
APPETITE CHANGE: 0
DYSURIA: 0
VOMITING: 0
SORE THROAT: 0
PALPITATIONS: 0
MYALGIAS: 1
CONSTIPATION: 0
SHORTNESS OF BREATH: 0
NUMBNESS: 0
NAUSEA: 0
EYE PROBLEMS: 0
BLOOD IN STOOL: 0
WOUND: 0
ARTHRALGIAS: 1
CHILLS: 0
DIARRHEA: 0
HEADACHES: 0
BACK PAIN: 1
BRUISES/BLEEDS EASILY: 0
ABDOMINAL PAIN: 0
VOICE CHANGE: 0
LIGHT-HEADEDNESS: 0
FEVER: 0
TROUBLE SWALLOWING: 0
FATIGUE: 1
HEMATURIA: 0
FREQUENCY: 0
WHEEZING: 0
LEG SWELLING: 1
DIZZINESS: 0
EXTREMITY WEAKNESS: 0
COUGH: 0
UNEXPECTED WEIGHT CHANGE: 0

## 2025-07-22 ASSESSMENT — PAIN SCALES - GENERAL: PAINLEVEL_OUTOF10: 9

## 2025-07-22 NOTE — PATIENT INSTRUCTIONS
Today :We administered secukinumab (Cosentyx) 300 mg in sodium chloride 0.9% 100 mL IVPB.     For:   1. Ankylosing spondylitis, unspecified site of spine (Multi)         Your next appointment is due in:  28 days        Please read the  Medication Guide that was given to you and reviewed during todays visit.     (Tell all doctors including dentists that you are taking this medication)     Go to the emergency room or call 911 if:  -You have signs of allergic reaction:   -Rash, hives, itching.   -Swollen, blistered, peeling skin.   -Swelling of face, lips, mouth, tongue or throat.   -Tightness of chest, trouble breathing, swallowing or talking     Call your doctor:  - If IV / injection site gets red, warm, swollen, itchy or leaks fluid or pus.     (Leave dressing on your IV site for at least 2 hours and keep area clean and dry  - If you get sick or have symptoms of infection or are not feeling well for any reason.    (Wash your hands often, stay away from people who are sick)  - If you have side effects from your medication that do not go away or are bothersome.     (Refer to the teaching your nurse gave you for side effects to call your doctor about)    - Common side effects may include:  stuffy nose, headache, feeling tired, muscle aches, upset stomach  - Before receiving any vaccines     - Call the [facility] at   If:  - You get sick, are on antibiotics, have had a recent vaccine, have surgery or dental work and your doctor wants your visit rescheduled.  - You need to cancel and reschedule your visit for any reason. Call at least 2 days before your visit if you need to cancel.   - Your insurance changes before your next visit.    (We will need to get approval from your new insurance. This can take up to two weeks.)     [facility] is opened Monday thru Friday. We are closed on weekends and holidays.   Voice mail will take your call if the center is closed. If you leave a message  please allow 24 hours for a call back during weekdays. If you leave a message on a weekend/holiday, we will call you back the next business day.    A pharmacist is available Monday - Friday from 8:30AM to 3:30PM to help answer any questions you may have about your prescriptions(s). Please call pharmacy at:    [facility]: [phone]  [facility]: [phone]  [facility]: [phone]

## 2025-07-22 NOTE — PROGRESS NOTES
[facility]   Infusion Clinic Note   Date: 2025   Name: Angeline Gurrola  : 1980   MRN: [id number]         Reason for Visit: OP Infusion (Cosentyx 300 mg every 28 days)         Today: We administered secukinumab (Cosentyx) 300 mg in sodium chloride 0.9% 100 mL IVPB.       Ordered By: Mimi Raya MD       For a Diagnosis of: Ankylosing spondylitis, unspecified site of spine (Multi)       At today's visit patient accompanied by: Self      Today's Vitals:   Vitals:    25 1302 25 1415   BP: 130/70 98/67   Pulse: 65 66   Resp: 18 18   Temp: 36.3 °C (97.3 °F) 36.2 °C (97.2 °F)   SpO2: 97% 97%   Weight: (!) 185 kg (407 lb 3.2 oz)    PainSc:   9    PainLoc: Generalized    LMP: 06/10/2025             Pre - Treatment Checklist:      - Previous reaction to current treatment: no      (Assess patient for the concerns below. Document provider notification as appropriate).  - Active or recent infection with/without current antibiotic use: no  - Recent or planned invasive dental work: no  - Recent or planned surgeries: no  - Recently received or plans to receive vaccinations: no  - Has treatment related toxicities: no  - Any chance may be pregnant:  no      Pain: 9   - Is the pain different from normal: yes   - Is prescribing Doctor aware:  yes   Left knee pain has increased.  She saw her doctor 2 weeks about it and stated it was related to her arthritis.  She states she is seeking a second opinion.   Labs: Reviewed       Fall Risk Screening: Kraus Fall Risk  History of Falling, Immediate or Within 3 Months: Yes  Secondary Diagnosis: Yes  Ambulatory Aid: Walks without aid/bedrest/nurse assist  Intravenous Therapy/Heparin Lock: Yes  Gait/Transferring: Normal/bedrest/immobile  Mental Status: Oriented to own ability  Kraus Fall Risk Score: 60            Review Of Systems:  Review of Systems   Constitutional:  Positive for fatigue. Negative for appetite change, chills, fever and  "unexpected weight change.   HENT:   Negative for hearing loss, mouth sores, sore throat, tinnitus, trouble swallowing and voice change.    Eyes:  Negative for eye problems.   Respiratory:  Negative for cough, shortness of breath and wheezing.    Cardiovascular:  Positive for leg swelling. Negative for chest pain and palpitations.   Gastrointestinal:  Negative for abdominal pain, blood in stool, constipation, diarrhea, nausea and vomiting.   Genitourinary:  Negative for dysuria, frequency and hematuria.    Musculoskeletal:  Positive for arthralgias, back pain and myalgias.   Skin:  Negative for itching, rash and wound.   Neurological:  Negative for dizziness, extremity weakness, headaches, light-headedness and numbness.   Hematological:  Does not bruise/bleed easily.         Infusion Readiness:  - Assessment Concerns Related to Infusion: No  - Provider notified: n/a      New Patient Education:    N/A (returning patient for continuation of therapy. Ongoing education provided as needed.)        Treatment Conditions & Drug Specific Questions:    Secukinumab (COSENTYX)    (Unless otherwise specified on patient specific therapy plan):     TREATMENT CONDITIONS:  Unless otherwise specified on patient specific therapy plan HOLD and notify Provider prior to proceeding with today's infusion if patient with:  o Positive T-Spot  o Reactive Hep B and Hep C virus antibody  o Positive pregnancy prior to first treatment in women of childbearing ability    Lab Results   Component Value Date    TBSIN Negative 03/06/2024    QFG NEGATIVE 05/23/2018      Lab Results   Component Value Date    HEPBSAG Nonreactive 03/28/2024      No results found for: \"NONUHFIRE\", \"NONUHSWGH\", \"NONUHFISH\"  No results found for: \"HBCTI\", \"HEPBCAB\"  Lab Results   Component Value Date    HEPCAB Nonreactive 03/28/2024      Lab Results   Component Value Date    HEPCAB Nonreactive 03/28/2024       If available for review: CBC-D; CMP; CRP; LFT  Lab Results "   Component Value Date    WBC 8.7 06/24/2025    HGB 11.9 (L) 06/24/2025    HCT 36.8 06/24/2025    MCV 93 06/24/2025     06/24/2025        Chemistry    Lab Results   Component Value Date/Time     06/24/2025 1323     05/14/2025 1154    K 4.1 06/24/2025 1323    K 4.7 05/14/2025 1154     06/24/2025 1323    CL 97 (L) 05/14/2025 1154    CO2 27 06/24/2025 1323    CO2 29 05/14/2025 1154    BUN 14 06/24/2025 1323    BUN 19 05/14/2025 1154    CREATININE 0.73 06/24/2025 1323    CREATININE 0.79 05/14/2025 1154    Lab Results   Component Value Date/Time    CALCIUM 8.8 06/24/2025 1323    CALCIUM 9.6 05/14/2025 1154    ALKPHOS 107 06/24/2025 1323    ALKPHOS 113 05/14/2025 1154    AST 19 06/24/2025 1323    AST 15 05/14/2025 1154    ALT 23 06/24/2025 1323    ALT 17 05/14/2025 1154    BILITOT 0.3 06/24/2025 1323    BILITOT 0.6 05/14/2025 1154           Lab Results   Component Value Date    CRP 1.81 (H) 03/06/2024      Lab Results   Component Value Date    ALT 23 06/24/2025    AST 19 06/24/2025    ALKPHOS 107 06/24/2025    BILITOT 0.3 06/24/2025        Patient meets treatment conditions? Yes    DRUG SPECIFIC QUESTIONS:  Up to date on all immunizations per patient report? Yes    Do you have a history of irritable bowel disease? Yes          (If yes educate patient that treatment may cause exacerbations and/or new onset of inflammatory bowel of inflammatory bowel disease)    Any new or recent diagnosis of cancer, specifically skin cancer?No          (If yes notify provider prior to proceeding)    -   Any new or worsening rashes / lesions? No  ( If YES notify provider piror to proceeding)    REMINDER:   WEIGHT BASED DRUG   PREGNANCY TEST PRIOR TO FIRST INFUSION FOR WOMEN OF CHILDBEARING ABILITY.    Recommended Vitals/Observation:  Vitals: Obtain vital signs at start and end of infusion, and as needed.  Observation: No observation.         Weight Based Drug Calculations:    WEIGHT BASED DRUGS: Secukinumab  (COSENTYX)   Patient's dosing weight (kg): 205 kg    10% weight variance for prescribed treatment: 184.5 kg to 225.5 kg     Patient's weight today:   Vitals:    07/22/25 1302   Weight: (!) 185 kg (407 lb 3.2 oz)         weight range for prescribed dose:     Patient weight today falls outside of 10% variance or  weight range: No    [facility] pharmacist informed of weight variance: No    Doses that are weight based have an acceptable variance rule within 10% of the prescribed   order and/or within  weight range. If patient weight on day of infusion falls   outside of the 10% variance, or weight range, infusion is administered and   pharmacy contacted regarding future dosing adjustments, per policy.      Post Treatment: Patient tolerated treatment without issue and was discharged in no apparent distress.      Note Authored / Patient Cared for By: Lakeisha Garcia RN   _________________________________________________________________________    Note authored and patient cared for by: Lakeisha Garcia RN   Note/Encounter reviewed by: Jany MONTAGUE NP. This provider on site at time of patient infusion. Infusion staff to notify this provider of any questions, concerns, abnormals or issues during infusion.    Final check of medication completed by this EDDI / or on-site pharmacist with administering nurse using positive identification prior to administration. Final appearance of product checked for accuracy and conformity to the formula of the prepared product. Assured use of correct ingredients, accurate calculations and precise measurements under appropriate conditions and procedures.    No issues reported during today's encounter. Pt. tolerated infusion without difficulty. Pt. not independently evaluated by this provider during today's encounter.  (Jany MONTAGUE NP)

## 2025-07-29 ENCOUNTER — TELEPHONE (OUTPATIENT)
Dept: ORTHOPEDIC SURGERY | Facility: CLINIC | Age: 45
End: 2025-07-29
Payer: COMMERCIAL

## 2025-07-29 NOTE — TELEPHONE ENCOUNTER
Nieves denied through rx benefits. Initiated authorization through [employer] but Euflexxa is the preferred. Sent medical prior auth will update once I hear determination.

## 2025-07-31 ENCOUNTER — DOCUMENTATION (OUTPATIENT)
Dept: PAIN MEDICINE | Facility: CLINIC | Age: 45
End: 2025-07-31
Payer: COMMERCIAL

## 2025-07-31 NOTE — PROGRESS NOTES
P2P is scheduled for TOMORROW AT 2PM  We have to call [phone], Option 1, Option 1  Case#[id number]  Dr. Marlon Gonzales      Discussed the case with Dr Gonzales who could not approve the peripheral nerve stimulator because of the advanced nature of hte OA and genicular nerve blocks , RFA and PNS for hte knee are considered investigational and will not be approved.   Dr Gonzales recommended TKR . But patient not ready for that yet, still I was not able to get approval.

## 2025-08-06 ENCOUNTER — TELEPHONE (OUTPATIENT)
Dept: ORTHOPEDIC SURGERY | Facility: CLINIC | Age: 45
End: 2025-08-06
Payer: COMMERCIAL

## 2025-08-06 NOTE — TELEPHONE ENCOUNTER
EDIT- I spoke with Angeline who confirmed that she has had more than 2 CSI in the right knee-she was a patient of Dr. Bennett. She has also done more than 6 weeks of PT for the right knee. I spoke with Adi at [facility] and they are adding this info in to get approval for the right knee Euflexxa.       Fly from [employer] [facility] called in regards to the patients Bilat Euflexxa that was ordered. It does not look like the patient has done PT for the RT knee and she also has not tried more than one CSI in that knee either. The left knee will be approved but she will need either PT or at least 2 CSI in the rt knee to proceed with the RT knee euflexxa. I reached out to the patient to double check and make sure she hasn't had injections elsewhere, but she did not answer. I left a message.  We have until Friday 8/15/25 to addend the order to just the left knee if she would like.     Tracking #[id number]

## 2025-08-15 ENCOUNTER — OFFICE VISIT (OUTPATIENT)
Dept: PRIMARY CARE | Facility: CLINIC | Age: 45
End: 2025-08-15
Payer: COMMERCIAL

## 2025-08-15 ASSESSMENT — ENCOUNTER SYMPTOMS
FEVER: 0
DIZZINESS: 0
LOSS OF SENSATION IN FEET: 1
DEPRESSION: 1
ARTHRALGIAS: 1
SHORTNESS OF BREATH: 0
DIARRHEA: 0
ENDOCRINE NEGATIVE: 1
OCCASIONAL FEELINGS OF UNSTEADINESS: 1
NAUSEA: 0
DIFFICULTY URINATING: 0

## 2025-08-15 ASSESSMENT — PAIN SCALES - GENERAL: PAINLEVEL_OUTOF10: 9

## 2025-08-16 ENCOUNTER — HOSPITAL ENCOUNTER (OUTPATIENT)
Dept: RADIOLOGY | Facility: HOSPITAL | Age: 45
Discharge: HOME | End: 2025-08-16
Payer: COMMERCIAL

## 2025-08-16 DIAGNOSIS — M79.605 PAIN OF LEFT LOWER EXTREMITY: ICD-10-CM

## 2025-08-16 PROCEDURE — 73590 X-RAY EXAM OF LOWER LEG: CPT | Mod: LT

## 2025-08-16 PROCEDURE — 73590 X-RAY EXAM OF LOWER LEG: CPT | Mod: LEFT SIDE | Performed by: STUDENT IN AN ORGANIZED HEALTH CARE EDUCATION/TRAINING PROGRAM

## 2025-08-19 ENCOUNTER — APPOINTMENT (OUTPATIENT)
Dept: INFUSION THERAPY | Facility: CLINIC | Age: 45
End: 2025-08-19
Payer: COMMERCIAL

## 2025-08-19 VITALS
RESPIRATION RATE: 18 BRPM | HEART RATE: 60 BPM | SYSTOLIC BLOOD PRESSURE: 108 MMHG | OXYGEN SATURATION: 98 % | TEMPERATURE: 97.3 F | BODY MASS INDEX: 62.92 KG/M2 | WEIGHT: 293 LBS | DIASTOLIC BLOOD PRESSURE: 60 MMHG

## 2025-08-19 DIAGNOSIS — M45.9 ANKYLOSING SPONDYLITIS, UNSPECIFIED SITE OF SPINE (MULTI): Primary | ICD-10-CM

## 2025-08-19 PROCEDURE — 96413 CHEMO IV INFUSION 1 HR: CPT | Performed by: NURSE PRACTITIONER

## 2025-08-19 RX ORDER — FAMOTIDINE 10 MG/ML
20 INJECTION, SOLUTION INTRAVENOUS ONCE AS NEEDED
OUTPATIENT
Start: 2025-09-16

## 2025-08-19 RX ORDER — DIPHENHYDRAMINE HYDROCHLORIDE 50 MG/ML
50 INJECTION, SOLUTION INTRAMUSCULAR; INTRAVENOUS AS NEEDED
OUTPATIENT
Start: 2025-09-16

## 2025-08-19 RX ORDER — ALBUTEROL SULFATE 0.83 MG/ML
3 SOLUTION RESPIRATORY (INHALATION) AS NEEDED
OUTPATIENT
Start: 2025-09-16

## 2025-08-19 RX ORDER — EPINEPHRINE 0.3 MG/.3ML
0.3 INJECTION SUBCUTANEOUS EVERY 5 MIN PRN
OUTPATIENT
Start: 2025-09-16

## 2025-08-19 ASSESSMENT — ENCOUNTER SYMPTOMS
EYE PROBLEMS: 0
LIGHT-HEADEDNESS: 0
ABDOMINAL PAIN: 0
FATIGUE: 1
SORE THROAT: 0
DYSURIA: 0
ARTHRALGIAS: 1
TROUBLE SWALLOWING: 0
LEG SWELLING: 1
BRUISES/BLEEDS EASILY: 0
CHILLS: 0
BACK PAIN: 1
HEMATURIA: 0
PALPITATIONS: 0
WOUND: 0
EXTREMITY WEAKNESS: 1
BLOOD IN STOOL: 0
VOICE CHANGE: 0
WHEEZING: 0
MYALGIAS: 1
FEVER: 0
UNEXPECTED WEIGHT CHANGE: 0
APPETITE CHANGE: 0
DIZZINESS: 0
HEADACHES: 1
SHORTNESS OF BREATH: 0
NAUSEA: 0
VOMITING: 0
DIARRHEA: 0
FREQUENCY: 0
NUMBNESS: 0
CONSTIPATION: 0
COUGH: 0

## 2025-08-19 ASSESSMENT — PAIN SCALES - GENERAL: PAINLEVEL_OUTOF10: 8

## 2025-09-10 ENCOUNTER — APPOINTMENT (OUTPATIENT)
Dept: ORTHOPEDIC SURGERY | Facility: CLINIC | Age: 45
End: 2025-09-10
Payer: COMMERCIAL

## 2025-09-15 ENCOUNTER — APPOINTMENT (OUTPATIENT)
Dept: INFUSION THERAPY | Facility: CLINIC | Age: 45
End: 2025-09-15
Payer: COMMERCIAL

## 2025-09-18 ENCOUNTER — APPOINTMENT (OUTPATIENT)
Dept: ORTHOPEDIC SURGERY | Facility: CLINIC | Age: 45
End: 2025-09-18
Payer: COMMERCIAL

## 2025-09-24 ENCOUNTER — APPOINTMENT (OUTPATIENT)
Dept: ORTHOPEDIC SURGERY | Facility: CLINIC | Age: 45
End: 2025-09-24
Payer: COMMERCIAL

## 2025-10-13 ENCOUNTER — APPOINTMENT (OUTPATIENT)
Dept: INFUSION THERAPY | Facility: CLINIC | Age: 45
End: 2025-10-13
Payer: COMMERCIAL

## 2025-11-03 ENCOUNTER — APPOINTMENT (OUTPATIENT)
Dept: OBSTETRICS AND GYNECOLOGY | Facility: CLINIC | Age: 45
End: 2025-11-03
Payer: COMMERCIAL

## 2025-11-10 ENCOUNTER — APPOINTMENT (OUTPATIENT)
Dept: INFUSION THERAPY | Facility: CLINIC | Age: 45
End: 2025-11-10
Payer: COMMERCIAL

## 2025-12-09 ENCOUNTER — APPOINTMENT (OUTPATIENT)
Dept: RHEUMATOLOGY | Facility: CLINIC | Age: 45
End: 2025-12-09
Payer: COMMERCIAL